# Patient Record
Sex: FEMALE | Race: WHITE | Employment: OTHER | URBAN - NONMETROPOLITAN AREA
[De-identification: names, ages, dates, MRNs, and addresses within clinical notes are randomized per-mention and may not be internally consistent; named-entity substitution may affect disease eponyms.]

---

## 2017-08-15 ENCOUNTER — HOSPITAL ENCOUNTER (OUTPATIENT)
Dept: GENERAL RADIOLOGY | Age: 72
Discharge: HOME | End: 2017-08-15
Payer: MEDICARE

## 2017-08-15 DIAGNOSIS — R05.9 COUGH: ICD-10-CM

## 2017-08-15 DIAGNOSIS — R06.03 ACUTE RESPIRATORY DISTRESS: ICD-10-CM

## 2017-08-15 PROCEDURE — 71020 XR CHEST STANDARD TWO VW: CPT

## 2017-11-24 ENCOUNTER — HOSPITAL ENCOUNTER (OUTPATIENT)
Dept: WOMENS IMAGING | Age: 72
Discharge: HOME | End: 2017-11-24
Payer: MEDICARE

## 2017-11-24 DIAGNOSIS — Z12.39 ENCOUNTER FOR BREAST CANCER SCREENING OTHER THAN MAMMOGRAM: ICD-10-CM

## 2017-11-24 PROCEDURE — G0202 SCR MAMMO BI INCL CAD: HCPCS

## 2018-04-03 ENCOUNTER — HOSPITAL ENCOUNTER (OUTPATIENT)
Dept: ULTRASOUND IMAGING | Age: 73
Discharge: HOME | End: 2018-04-05
Payer: MEDICARE

## 2018-04-03 DIAGNOSIS — K76.89 HEPATIC CYST: ICD-10-CM

## 2018-04-03 PROCEDURE — 76705 ECHO EXAM OF ABDOMEN: CPT

## 2018-05-03 ENCOUNTER — HOSPITAL ENCOUNTER (OUTPATIENT)
Dept: SLEEP CENTER | Age: 73
Discharge: HOME | End: 2018-05-03
Payer: MEDICARE

## 2018-05-03 PROCEDURE — 95810 POLYSOM 6/> YRS 4/> PARAM: CPT

## 2018-05-03 ASSESSMENT — SLEEP AND FATIGUE QUESTIONNAIRES
HOW LIKELY ARE YOU TO NOD OFF OR FALL ASLEEP WHILE SITTING AND READING: 1
ESS TOTAL SCORE: 3
HOW LIKELY ARE YOU TO NOD OFF OR FALL ASLEEP WHEN YOU ARE A PASSENGER IN A CAR FOR AN HOUR WITHOUT A BREAK: 0
HOW LIKELY ARE YOU TO NOD OFF OR FALL ASLEEP WHILE SITTING QUIETLY AFTER LUNCH WITHOUT ALCOHOL: 0
HOW LIKELY ARE YOU TO NOD OFF OR FALL ASLEEP WHILE SITTING INACTIVE IN A PUBLIC PLACE: 0
HOW LIKELY ARE YOU TO NOD OFF OR FALL ASLEEP WHILE SITTING AND TALKING TO SOMEONE: 0
HOW LIKELY ARE YOU TO NOD OFF OR FALL ASLEEP WHILE WATCHING TV: 1
HOW LIKELY ARE YOU TO NOD OFF OR FALL ASLEEP IN A CAR, WHILE STOPPED FOR A FEW MINUTES IN TRAFFIC: 0
HOW LIKELY ARE YOU TO NOD OFF OR FALL ASLEEP WHILE LYING DOWN TO REST IN THE AFTERNOON WHEN CIRCUMSTANCES PERMIT: 1

## 2018-05-18 ENCOUNTER — HOSPITAL ENCOUNTER (OUTPATIENT)
Dept: VASCULAR LAB | Age: 73
Discharge: HOME | End: 2018-05-20
Payer: MEDICARE

## 2018-05-18 DIAGNOSIS — M79.605 PAIN OF LEFT LOWER EXTREMITY: ICD-10-CM

## 2018-05-18 PROCEDURE — 93971 EXTREMITY STUDY: CPT

## 2018-06-12 ENCOUNTER — HOSPITAL ENCOUNTER (OUTPATIENT)
Dept: SLEEP CENTER | Age: 73
Discharge: HOME | End: 2018-06-12
Payer: MEDICARE

## 2018-06-12 PROCEDURE — 95811 POLYSOM 6/>YRS CPAP 4/> PARM: CPT

## 2018-06-12 ASSESSMENT — SLEEP AND FATIGUE QUESTIONNAIRES
HOW LIKELY ARE YOU TO NOD OFF OR FALL ASLEEP WHEN YOU ARE A PASSENGER IN A CAR FOR AN HOUR WITHOUT A BREAK: 0
HOW LIKELY ARE YOU TO NOD OFF OR FALL ASLEEP WHILE WATCHING TV: 3
HOW LIKELY ARE YOU TO NOD OFF OR FALL ASLEEP WHILE SITTING AND TALKING TO SOMEONE: 1
HOW LIKELY ARE YOU TO NOD OFF OR FALL ASLEEP WHILE LYING DOWN TO REST IN THE AFTERNOON WHEN CIRCUMSTANCES PERMIT: 3
HOW LIKELY ARE YOU TO NOD OFF OR FALL ASLEEP WHILE SITTING INACTIVE IN A PUBLIC PLACE: 0
HOW LIKELY ARE YOU TO NOD OFF OR FALL ASLEEP IN A CAR, WHILE STOPPED FOR A FEW MINUTES IN TRAFFIC: 1
HOW LIKELY ARE YOU TO NOD OFF OR FALL ASLEEP WHILE SITTING AND READING: 3
HOW LIKELY ARE YOU TO NOD OFF OR FALL ASLEEP WHILE SITTING QUIETLY AFTER LUNCH WITHOUT ALCOHOL: 0
ESS TOTAL SCORE: 11
NECK CIRCUMFERENCE (INCHES): 17.75

## 2019-03-06 ENCOUNTER — HOSPITAL ENCOUNTER (OUTPATIENT)
Dept: WOMENS IMAGING | Age: 74
Discharge: HOME | End: 2019-03-08
Payer: MEDICARE

## 2019-03-06 DIAGNOSIS — Z12.39 SCREENING BREAST EXAMINATION: ICD-10-CM

## 2019-03-06 PROCEDURE — 77063 BREAST TOMOSYNTHESIS BI: CPT

## 2019-06-07 ENCOUNTER — HOSPITAL ENCOUNTER (OUTPATIENT)
Dept: GENERAL RADIOLOGY | Age: 74
Discharge: HOME | End: 2019-06-09
Payer: MEDICARE

## 2019-06-07 ENCOUNTER — HOSPITAL ENCOUNTER (OUTPATIENT)
Age: 74
Discharge: HOME | End: 2019-06-09
Payer: MEDICARE

## 2019-06-07 DIAGNOSIS — M25.511 RIGHT SHOULDER PAIN, UNSPECIFIED CHRONICITY: ICD-10-CM

## 2019-06-07 PROCEDURE — 73000 X-RAY EXAM OF COLLAR BONE: CPT

## 2019-06-07 PROCEDURE — 73030 X-RAY EXAM OF SHOULDER: CPT

## 2019-08-30 ENCOUNTER — HOSPITAL ENCOUNTER (OUTPATIENT)
Dept: GENERAL RADIOLOGY | Age: 74
Discharge: HOME | End: 2019-09-01
Payer: MEDICARE

## 2019-08-30 ENCOUNTER — HOSPITAL ENCOUNTER (OUTPATIENT)
Age: 74
Discharge: HOME | End: 2019-09-01
Payer: MEDICARE

## 2019-08-30 DIAGNOSIS — R05.9 COUGH: ICD-10-CM

## 2019-08-30 PROCEDURE — 71046 X-RAY EXAM CHEST 2 VIEWS: CPT

## 2020-08-07 ENCOUNTER — HOSPITAL ENCOUNTER (OUTPATIENT)
Dept: WOMENS IMAGING | Age: 75
Discharge: HOME | End: 2020-08-09
Payer: MEDICARE

## 2020-08-07 PROCEDURE — 77067 SCR MAMMO BI INCL CAD: CPT

## 2021-05-24 ENCOUNTER — HOSPITAL ENCOUNTER (OUTPATIENT)
Age: 76
Setting detail: SPECIMEN
Discharge: HOME | End: 2021-05-24
Payer: MEDICARE

## 2021-05-24 LAB
-: ABNORMAL
AMORPHOUS: ABNORMAL
BACTERIA: ABNORMAL
BILIRUBIN URINE: NEGATIVE
CASTS UA: ABNORMAL /LPF
COLOR: YELLOW
COMMENT UA: ABNORMAL
CRYSTALS, UA: ABNORMAL /HPF
EPITHELIAL CELLS UA: ABNORMAL /HPF (ref 0–25)
GLUCOSE URINE: NEGATIVE
KETONES, URINE: ABNORMAL
LEUKOCYTE ESTERASE, URINE: ABNORMAL
MUCUS: ABNORMAL
NITRITE, URINE: POSITIVE
OTHER OBSERVATIONS UA: ABNORMAL
PH UA: 7 (ref 5–9)
PROTEIN UA: ABNORMAL
RBC UA: ABNORMAL /HPF (ref 0–2)
RENAL EPITHELIAL, UA: ABNORMAL /HPF
SPECIFIC GRAVITY UA: 1.01 (ref 1.01–1.02)
TRICHOMONAS: ABNORMAL
TURBIDITY: ABNORMAL
URINE HGB: ABNORMAL
UROBILINOGEN, URINE: NORMAL
WBC UA: ABNORMAL /HPF (ref 0–5)
YEAST: ABNORMAL

## 2021-05-24 PROCEDURE — 87088 URINE BACTERIA CULTURE: CPT

## 2021-05-24 PROCEDURE — 87186 SC STD MICRODIL/AGAR DIL: CPT

## 2021-05-24 PROCEDURE — 81001 URINALYSIS AUTO W/SCOPE: CPT

## 2021-05-24 PROCEDURE — 87086 URINE CULTURE/COLONY COUNT: CPT

## 2021-05-26 LAB
CULTURE: ABNORMAL
Lab: ABNORMAL
SPECIMEN DESCRIPTION: ABNORMAL

## 2021-06-09 ENCOUNTER — HOSPITAL ENCOUNTER (OUTPATIENT)
Age: 76
Discharge: HOME | End: 2021-06-09
Payer: MEDICARE

## 2021-06-09 LAB
-: NORMAL
AMORPHOUS: NORMAL
BACTERIA: NORMAL
BILIRUBIN URINE: NEGATIVE
CASTS UA: NORMAL /LPF
COLOR: YELLOW
COMMENT UA: NORMAL
CRYSTALS, UA: NORMAL /HPF
EPITHELIAL CELLS UA: NORMAL /HPF (ref 0–25)
GLUCOSE URINE: NEGATIVE
KETONES, URINE: NEGATIVE
LEUKOCYTE ESTERASE, URINE: NEGATIVE
MUCUS: NORMAL
NITRITE, URINE: NEGATIVE
OTHER OBSERVATIONS UA: NORMAL
PH UA: 7.5 (ref 5–9)
PROTEIN UA: NEGATIVE
RBC UA: NORMAL /HPF (ref 0–2)
RENAL EPITHELIAL, UA: NORMAL /HPF
SPECIFIC GRAVITY UA: 1.01 (ref 1.01–1.02)
TRICHOMONAS: NORMAL
TURBIDITY: CLEAR
URINE HGB: NEGATIVE
UROBILINOGEN, URINE: NORMAL
WBC UA: NORMAL /HPF (ref 0–5)
YEAST: NORMAL

## 2021-06-09 PROCEDURE — 87086 URINE CULTURE/COLONY COUNT: CPT

## 2021-06-09 PROCEDURE — 81001 URINALYSIS AUTO W/SCOPE: CPT

## 2021-06-11 LAB
CULTURE: NORMAL
Lab: NORMAL
SPECIMEN DESCRIPTION: NORMAL

## 2022-01-11 ENCOUNTER — OFFICE VISIT (OUTPATIENT)
Dept: OBGYN | Age: 77
End: 2022-01-11
Payer: MEDICARE

## 2022-01-11 VITALS
SYSTOLIC BLOOD PRESSURE: 112 MMHG | HEIGHT: 65 IN | DIASTOLIC BLOOD PRESSURE: 74 MMHG | BODY MASS INDEX: 38.49 KG/M2 | WEIGHT: 231 LBS

## 2022-01-11 DIAGNOSIS — N39.41 URGE INCONTINENCE: Primary | ICD-10-CM

## 2022-01-11 DIAGNOSIS — N95.2 VAGINAL ATROPHY: ICD-10-CM

## 2022-01-11 DIAGNOSIS — N81.6 RECTOCELE: ICD-10-CM

## 2022-01-11 DIAGNOSIS — N39.3 STRESS INCONTINENCE: ICD-10-CM

## 2022-01-11 PROBLEM — M19.011 OSTEOARTHRITIS OF RIGHT GLENOHUMERAL JOINT: Status: ACTIVE | Noted: 2022-01-11

## 2022-01-11 PROBLEM — Z96.641 HISTORY OF RIGHT HIP REPLACEMENT: Status: ACTIVE | Noted: 2022-01-11

## 2022-01-11 PROCEDURE — 99203 OFFICE O/P NEW LOW 30 MIN: CPT | Performed by: OBSTETRICS & GYNECOLOGY

## 2022-01-11 RX ORDER — CELECOXIB 200 MG/1
200 CAPSULE ORAL 2 TIMES DAILY
COMMUNITY

## 2022-01-11 RX ORDER — OXYBUTYNIN CHLORIDE 10 MG/1
10 TABLET, EXTENDED RELEASE ORAL DAILY
Qty: 30 TABLET | Refills: 0 | Status: SHIPPED | OUTPATIENT
Start: 2022-01-11 | End: 2022-02-01

## 2022-01-11 NOTE — PROGRESS NOTES
DATE OF VISIT:  1/11/22    PATIENT NAME:  Justine Black     YOB: 1945    REASON FOR VISIT:    Chief Complaint   Patient presents with    Incontinence     Patient states that she has to wear a pad all the time. She notes that she does ok through the night but as soon as she sits up in the morning urine comes out. Her PCP though perhaps it was a spastic bladder. HISTORY OF PRESENT ILLNESS:  Pt presents with c/o incontinence; states it is the worst upon waking - cant make it to the bathroom; does use a pad daily due to occ leakage; has urgency; only stress symptom is with lifting something heavy; had pap at age 77 in [facility] at [facility]; disc'd rectocele, stress and urge incontinence and trial of oab med      No LMP recorded. Patient is postmenopausal.  Vitals:    01/11/22 1104   BP: 112/74   Weight: 231 lb (104.8 kg)   Height: 5' 5\" (1.651 m)     Body mass index is 38.44 kg/m². Allergies   Allergen Reactions    Sulfamethoxazole-Trimethoprim Rash    Aspirin Other (See Comments)     H/O brain bleed in 1983; told to never take aspirin products      Sulfa Antibiotics Rash     Current Outpatient Medications   Medication Sig Dispense Refill    oxybutynin (DITROPAN XL) 10 MG extended release tablet Take 1 tablet by mouth daily 30 tablet 0    CycloSPORINE (RESTASIS OP) Apply  to eye.  Gabapentin (NEURONTIN PO) Take  by mouth.  omeprazole (PRILOSEC) 20 MG capsule Take 20 mg by mouth daily.  atenolol-chlorthalidone (TENORETIC) 100-25 MG per tablet Take 1 tablet by mouth daily.  sertraline (ZOLOFT) 50 MG tablet Take 50 mg by mouth daily.  rOPINIRole (REQUIP) 0.25 MG tablet Take 0.25 mg by mouth 3 times daily.  magnesium (MAGNESIUM-OXIDE) 250 MG TABS tablet Take 250 mg by mouth 2 times daily.  pyridoxine (B-6) 50 MG tablet Take 50 mg by mouth daily.  Omega-3 Fatty Acids (FISH OIL) 1200 MG CAPS Take 1 tablet by mouth.       vitamin E 1000 UNITS capsule Take 1,000 Units by mouth daily.  calcium carbonate 600 MG TABS tablet Take 1 tablet by mouth 2 times daily. No current facility-administered medications for this visit. Social History     Socioeconomic History    Marital status:      Spouse name: None    Number of children: None    Years of education: None    Highest education level: None   Occupational History    None   Tobacco Use    Smoking status: Never Smoker    Smokeless tobacco: Never Used   Substance and Sexual Activity    Alcohol use: Never    Drug use: Yes     Types: Marijuana Mercedes Corrigan     Comment: Medical marijuana    Sexual activity: Not Currently     Partners: Male   Other Topics Concern    None   Social History Narrative    None     Social Determinants of Health     Financial Resource Strain:     Difficulty of Paying Living Expenses: Not on file   Food Insecurity:     Worried About Running Out of Food in the Last Year: Not on file    Timothy of Food in the Last Year: Not on file   Transportation Needs:     Lack of Transportation (Medical): Not on file    Lack of Transportation (Non-Medical):  Not on file   Physical Activity:     Days of Exercise per Week: Not on file    Minutes of Exercise per Session: Not on file   Stress:     Feeling of Stress : Not on file   Social Connections:     Frequency of Communication with Friends and Family: Not on file    Frequency of Social Gatherings with Friends and Family: Not on file    Attends Restorationism Services: Not on file    Active Member of Clubs or Organizations: Not on file    Attends Club or Organization Meetings: Not on file    Marital Status: Not on file   Intimate Partner Violence:     Fear of Current or Ex-Partner: Not on file    Emotionally Abused: Not on file    Physically Abused: Not on file    Sexually Abused: Not on file   Housing Stability:     Unable to Pay for Housing in the Last Year: Not on file    Number of [place] in the Last Year: Not on file  Unstable Housing in the Last Year: Not on file       REVIEW OF SYSTEMS:  Review of Systems   Genitourinary: Positive for urgency. Negative for dysuria and vaginal discharge. PHYSICAL EXAM:  /74   Ht 5' 5\" (1.651 m)   Wt 231 lb (104.8 kg)   BMI 38.44 kg/m²   Physical Exam  Constitutional:       Appearance: Normal appearance. Genitourinary:      Right Labia: No rash. Left Labia: No rash. Posterior vaginal prolapse present. Moderate vaginal atrophy present. HENT:      Head: Normocephalic and atraumatic. Eyes:      Extraocular Movements: Extraocular movements intact. Pupils: Pupils are equal, round, and reactive to light. Cardiovascular:      Rate and Rhythm: Normal rate. Pulmonary:      Effort: Pulmonary effort is normal.   Musculoskeletal:         General: Normal range of motion. Cervical back: Normal range of motion. Neurological:      Mental Status: She is alert and oriented to person, place, and time. Skin:     General: Skin is warm and dry. Psychiatric:         Mood and Affect: Mood normal.         Behavior: Behavior normal.         Thought Content: Thought content normal.         Judgment: Judgment normal.       The patient, Ayaz Benson is a 64 y.o. female, was seen with a total time spent of 30 minutes for the visit on this date of service by the E/M provider. The time component had both face to face and non face to face time spent in determining the total time component. Counseling and education regarding her diagnosis listed below and her options regarding those diagnoses were also included in determining her time component. Diagnosis Orders   1. Urge incontinence     2. Rectocele     3. Stress incontinence     4. Vaginal atrophy          The patient had her preventative health maintenance recommendations and follow-up reviewed with her at the completion of her visit. ASSESSMENT:      1. Urge incontinence    2. Rectocele    3.  Stress incontinence    4. Vaginal atrophy        PLAN:  No orders of the defined types were placed in this encounter. Return in about 3 weeks (around 2/1/2022) for follow up.        Electronically signed by Dayton De La Garza DO on 01/11/22

## 2022-02-01 ENCOUNTER — OFFICE VISIT (OUTPATIENT)
Dept: OBGYN | Age: 77
End: 2022-02-01
Payer: MEDICARE

## 2022-02-01 VITALS
WEIGHT: 232 LBS | SYSTOLIC BLOOD PRESSURE: 122 MMHG | HEIGHT: 65 IN | DIASTOLIC BLOOD PRESSURE: 78 MMHG | BODY MASS INDEX: 38.65 KG/M2

## 2022-02-01 DIAGNOSIS — N39.41 URGE INCONTINENCE: Primary | ICD-10-CM

## 2022-02-01 PROCEDURE — 99213 OFFICE O/P EST LOW 20 MIN: CPT | Performed by: OBSTETRICS & GYNECOLOGY

## 2022-02-01 RX ORDER — OXYBUTYNIN CHLORIDE 15 MG/1
15 TABLET, EXTENDED RELEASE ORAL DAILY
Qty: 30 TABLET | Refills: 11 | Status: SHIPPED | OUTPATIENT
Start: 2022-02-01

## 2022-02-01 NOTE — PROGRESS NOTES
DATE OF VISIT:  2/1/22    PATIENT NAME:  Hugo Lou     YOB: 1945    REASON FOR VISIT:    Chief Complaint   Patient presents with    Follow-up     Patient is being seen for follow up after starting Ditropan. She continues to have symptoms but notes that there has 50% improvement. HISTORY OF PRESENT ILLNESS:  Pt states that the symptoms have improved by about 50% - denies side effects - hoping to improve futher with adjust in dosing      No LMP recorded. Patient is postmenopausal.  Vitals:    02/01/22 1451   BP: 122/78   Weight: 232 lb (105.2 kg)   Height: 5' 5\" (1.651 m)     Body mass index is 38.61 kg/m². Allergies   Allergen Reactions    Sulfamethoxazole-Trimethoprim Rash    Aspirin Other (See Comments)     H/O brain bleed in 1983; told to never take aspirin products      Sulfa Antibiotics Rash     Current Outpatient Medications   Medication Sig Dispense Refill    oxybutynin (DITROPAN XL) 15 MG extended release tablet Take 1 tablet by mouth daily 30 tablet 11    celecoxib (CELEBREX) 200 MG capsule Take 200 mg by mouth 2 times daily      Multiple Vitamins-Minerals (DRY EYE FORMULA PO)       omeprazole (PRILOSEC) 20 MG capsule Take 20 mg by mouth daily.  atenolol-chlorthalidone (TENORETIC) 100-25 MG per tablet Take 1 tablet by mouth daily.  sertraline (ZOLOFT) 50 MG tablet Take 50 mg by mouth daily.  rOPINIRole (REQUIP) 0.25 MG tablet Take 0.25 mg by mouth 3 times daily.  Omega-3 Fatty Acids (FISH OIL) 1200 MG CAPS Take 1 tablet by mouth.  vitamin E 1000 UNITS capsule Take 1,000 Units by mouth daily.  calcium carbonate 600 MG TABS tablet Take 1 tablet by mouth 2 times daily.  CycloSPORINE (RESTASIS OP) Apply  to eye. (Patient not taking: Reported on 1/11/2022)      Gabapentin (NEURONTIN PO) Take  by mouth. (Patient not taking: Reported on 1/11/2022)      magnesium (MAGNESIUM-OXIDE) 250 MG TABS tablet Take 250 mg by mouth 2 times daily. (Patient not taking: Reported on 1/11/2022)      pyridoxine (B-6) 50 MG tablet Take 50 mg by mouth daily. (Patient not taking: Reported on 1/11/2022)       No current facility-administered medications for this visit. Social History     Socioeconomic History    Marital status:      Spouse name: None    Number of children: None    Years of education: None    Highest education level: None   Occupational History    None   Tobacco Use    Smoking status: Never Smoker    Smokeless tobacco: Never Used   Substance and Sexual Activity    Alcohol use: Never    Drug use: Yes     Types: Marijuana Lyndel You)     Comment: Medical marijuana    Sexual activity: Not Currently     Partners: Male   Other Topics Concern    None   Social History Narrative    None     Social Determinants of Health     Financial Resource Strain:     Difficulty of Paying Living Expenses: Not on file   Food Insecurity:     Worried About Running Out of Food in the Last Year: Not on file    Timothy of Food in the Last Year: Not on file   Transportation Needs:     Lack of Transportation (Medical): Not on file    Lack of Transportation (Non-Medical):  Not on file   Physical Activity:     Days of Exercise per Week: Not on file    Minutes of Exercise per Session: Not on file   Stress:     Feeling of Stress : Not on file   Social Connections:     Frequency of Communication with Friends and Family: Not on file    Frequency of Social Gatherings with Friends and Family: Not on file    Attends Jehovah's witness Services: Not on file    Active Member of Clubs or Organizations: Not on file    Attends Club or Organization Meetings: Not on file    Marital Status: Not on file   Intimate Partner Violence:     Fear of Current or Ex-Partner: Not on file    Emotionally Abused: Not on file    Physically Abused: Not on file    Sexually Abused: Not on file   Housing Stability:     Unable to Pay for Housing in the Last Year: Not on file    Number of

## 2022-03-03 ENCOUNTER — TELEPHONE (OUTPATIENT)
Dept: OBGYN CLINIC | Age: 77
End: 2022-03-03

## 2022-03-03 NOTE — TELEPHONE ENCOUNTER
Patient called and left a message stating that she was to call and let us know how she was doing on oxybutynin. Dosage was increased to 15mg. She reports a 75-80% effectiveness and she is wondering if she should continue or change dose again. discussed with Ethan Peguero and called pt back and informed her that this improvement rate is about that best she can expect. Harjinder Madrid with that and will continue medication. I reminded that she would need to be seen yearly in order for the medication to continue to be prescribed. She will call back to schedule closer to when she is due.

## 2022-03-10 ENCOUNTER — HOSPITAL ENCOUNTER (OUTPATIENT)
Dept: WOMENS IMAGING | Age: 77
Discharge: HOME | End: 2022-03-12
Payer: MEDICARE

## 2022-03-10 DIAGNOSIS — Z12.31 BREAST CANCER SCREENING BY MAMMOGRAM: ICD-10-CM

## 2022-03-10 PROCEDURE — 77063 BREAST TOMOSYNTHESIS BI: CPT

## 2022-04-08 ENCOUNTER — HOSPITAL ENCOUNTER (OUTPATIENT)
Dept: ULTRASOUND IMAGING | Age: 77
Discharge: HOME | End: 2022-04-10
Payer: MEDICARE

## 2022-04-08 ENCOUNTER — HOSPITAL ENCOUNTER (OUTPATIENT)
Dept: VASCULAR LAB | Age: 77
Discharge: HOME | End: 2022-04-10
Payer: MEDICARE

## 2022-04-08 ENCOUNTER — HOSPITAL ENCOUNTER (OUTPATIENT)
Dept: WOMENS IMAGING | Age: 77
Discharge: HOME | End: 2022-04-10
Payer: MEDICARE

## 2022-04-08 DIAGNOSIS — M79.89 PAIN AND SWELLING OF RIGHT UPPER EXTREMITY: ICD-10-CM

## 2022-04-08 DIAGNOSIS — R92.8 ABNORMAL MAMMOGRAM: ICD-10-CM

## 2022-04-08 DIAGNOSIS — M79.601 PAIN AND SWELLING OF RIGHT UPPER EXTREMITY: ICD-10-CM

## 2022-04-08 PROCEDURE — G0279 TOMOSYNTHESIS, MAMMO: HCPCS

## 2022-04-08 PROCEDURE — 76642 ULTRASOUND BREAST LIMITED: CPT

## 2022-04-08 PROCEDURE — 93971 EXTREMITY STUDY: CPT

## 2022-04-13 ENCOUNTER — HOSPITAL ENCOUNTER (OUTPATIENT)
Dept: GENERAL RADIOLOGY | Age: 77
Discharge: HOME | End: 2022-04-15
Payer: MEDICARE

## 2022-04-13 ENCOUNTER — HOSPITAL ENCOUNTER (OUTPATIENT)
Age: 77
Discharge: HOME | End: 2022-04-15
Payer: MEDICARE

## 2022-04-13 DIAGNOSIS — M25.511 RIGHT SHOULDER PAIN, UNSPECIFIED CHRONICITY: ICD-10-CM

## 2022-04-13 DIAGNOSIS — S40.011A CONTUSION OF RIGHT SCAPULA, INITIAL ENCOUNTER: ICD-10-CM

## 2022-04-13 PROCEDURE — 73030 X-RAY EXAM OF SHOULDER: CPT

## 2022-12-17 ENCOUNTER — APPOINTMENT (OUTPATIENT)
Dept: VASCULAR LAB | Age: 77
DRG: 176 | End: 2022-12-17
Payer: MEDICARE

## 2022-12-17 ENCOUNTER — HOSPITAL ENCOUNTER (INPATIENT)
Age: 77
LOS: 3 days | Discharge: HOME | DRG: 176 | End: 2022-12-20
Attending: FAMILY MEDICINE | Admitting: FAMILY MEDICINE
Payer: MEDICARE

## 2022-12-17 ENCOUNTER — APPOINTMENT (OUTPATIENT)
Dept: CT IMAGING | Age: 77
DRG: 176 | End: 2022-12-17
Payer: MEDICARE

## 2022-12-17 DIAGNOSIS — I26.94 MULTIPLE SUBSEGMENTAL PULMONARY EMBOLI WITHOUT ACUTE COR PULMONALE (HCC): Primary | ICD-10-CM

## 2022-12-17 PROBLEM — I10 ESSENTIAL HYPERTENSION: Status: ACTIVE | Noted: 2022-12-17

## 2022-12-17 PROBLEM — I26.99 BILATERAL PULMONARY EMBOLISM (HCC): Status: ACTIVE | Noted: 2022-12-17

## 2022-12-17 LAB
ABSOLUTE EOS #: 0.17 K/UL (ref 0–0.44)
ABSOLUTE IMMATURE GRANULOCYTE: 0.03 K/UL (ref 0–0.3)
ABSOLUTE LYMPH #: 0.87 K/UL (ref 1.1–3.7)
ABSOLUTE MONO #: 0.66 K/UL (ref 0.1–1.2)
ANION GAP SERPL CALCULATED.3IONS-SCNC: 10 MMOL/L (ref 9–17)
BASOPHILS # BLD: 0 % (ref 0–2)
BASOPHILS ABSOLUTE: <0.03 K/UL (ref 0–0.2)
BUN BLDV-MCNC: 16 MG/DL (ref 8–23)
BUN/CREAT BLD: 16 (ref 9–20)
CALCIUM SERPL-MCNC: 10.1 MG/DL (ref 8.6–10.4)
CHLORIDE BLD-SCNC: 99 MMOL/L (ref 98–107)
CO2: 30 MMOL/L (ref 20–31)
CREAT SERPL-MCNC: 1.01 MG/DL (ref 0.5–0.9)
EOSINOPHILS RELATIVE PERCENT: 2 % (ref 1–4)
GFR SERPL CREATININE-BSD FRML MDRD: 57 ML/MIN/1.73M2
GLUCOSE BLD-MCNC: 97 MG/DL (ref 70–99)
HCT VFR BLD CALC: 34 % (ref 36.3–47.1)
HEMOGLOBIN: 9.9 G/DL (ref 11.9–15.1)
IMMATURE GRANULOCYTES: 0 %
INR BLD: 1.1
LYMPHOCYTES # BLD: 12 % (ref 24–43)
MCH RBC QN AUTO: 23 PG (ref 25.2–33.5)
MCHC RBC AUTO-ENTMCNC: 29.1 G/DL (ref 28.4–34.8)
MCV RBC AUTO: 79.1 FL (ref 82.6–102.9)
MONOCYTES # BLD: 9 % (ref 3–12)
NRBC AUTOMATED: 0 PER 100 WBC
PDW BLD-RTO: 15.9 % (ref 11.8–14.4)
PLATELET # BLD: 211 K/UL (ref 138–453)
PMV BLD AUTO: 9.8 FL (ref 8.1–13.5)
POTASSIUM SERPL-SCNC: 3.4 MMOL/L (ref 3.7–5.3)
PROTHROMBIN TIME: 14.5 SEC (ref 11.5–14.2)
RBC # BLD: 4.3 M/UL (ref 3.95–5.11)
SEG NEUTROPHILS: 77 % (ref 36–65)
SEGMENTED NEUTROPHILS ABSOLUTE COUNT: 5.5 K/UL (ref 1.5–8.1)
SODIUM BLD-SCNC: 139 MMOL/L (ref 135–144)
WBC # BLD: 7.3 K/UL (ref 3.5–11.3)

## 2022-12-17 PROCEDURE — 99285 EMERGENCY DEPT VISIT HI MDM: CPT

## 2022-12-17 PROCEDURE — 85025 COMPLETE CBC W/AUTO DIFF WBC: CPT

## 2022-12-17 PROCEDURE — 71260 CT THORAX DX C+: CPT | Performed by: PHYSICIAN ASSISTANT

## 2022-12-17 PROCEDURE — 1200000000 HC SEMI PRIVATE

## 2022-12-17 PROCEDURE — 6370000000 HC RX 637 (ALT 250 FOR IP): Performed by: PHYSICIAN ASSISTANT

## 2022-12-17 PROCEDURE — 93971 EXTREMITY STUDY: CPT

## 2022-12-17 PROCEDURE — 80048 BASIC METABOLIC PNL TOTAL CA: CPT

## 2022-12-17 PROCEDURE — 2580000003 HC RX 258: Performed by: FAMILY MEDICINE

## 2022-12-17 PROCEDURE — 94761 N-INVAS EAR/PLS OXIMETRY MLT: CPT

## 2022-12-17 PROCEDURE — 36415 COLL VENOUS BLD VENIPUNCTURE: CPT

## 2022-12-17 PROCEDURE — 6370000000 HC RX 637 (ALT 250 FOR IP): Performed by: FAMILY MEDICINE

## 2022-12-17 PROCEDURE — 6360000004 HC RX CONTRAST MEDICATION: Performed by: PHYSICIAN ASSISTANT

## 2022-12-17 PROCEDURE — 85610 PROTHROMBIN TIME: CPT

## 2022-12-17 RX ORDER — ATENOLOL AND CHLORTHALIDONE TABLET 100; 25 MG/1; MG/1
1 TABLET ORAL DAILY
Status: DISCONTINUED | OUTPATIENT
Start: 2022-12-18 | End: 2022-12-17

## 2022-12-17 RX ORDER — CALCIUM CARBONATE 200(500)MG
1 TABLET,CHEWABLE ORAL 2 TIMES DAILY
Status: DISCONTINUED | OUTPATIENT
Start: 2022-12-17 | End: 2022-12-20 | Stop reason: HOSPADM

## 2022-12-17 RX ORDER — CELECOXIB 200 MG/1
200 CAPSULE ORAL 2 TIMES DAILY
Status: DISCONTINUED | OUTPATIENT
Start: 2022-12-17 | End: 2022-12-18

## 2022-12-17 RX ORDER — SODIUM CHLORIDE 0.9 % (FLUSH) 0.9 %
5-40 SYRINGE (ML) INJECTION EVERY 12 HOURS SCHEDULED
Status: DISCONTINUED | OUTPATIENT
Start: 2022-12-17 | End: 2022-12-20 | Stop reason: HOSPADM

## 2022-12-17 RX ORDER — ONDANSETRON 2 MG/ML
4 INJECTION INTRAMUSCULAR; INTRAVENOUS EVERY 6 HOURS PRN
Status: DISCONTINUED | OUTPATIENT
Start: 2022-12-17 | End: 2022-12-20 | Stop reason: HOSPADM

## 2022-12-17 RX ORDER — PANTOPRAZOLE SODIUM 40 MG/1
40 TABLET, DELAYED RELEASE ORAL
Status: DISCONTINUED | OUTPATIENT
Start: 2022-12-18 | End: 2022-12-20 | Stop reason: HOSPADM

## 2022-12-17 RX ORDER — ASCORBIC ACID 500 MG
500 TABLET ORAL DAILY
COMMUNITY

## 2022-12-17 RX ORDER — ACETAMINOPHEN 325 MG/1
650 TABLET ORAL EVERY 6 HOURS PRN
Status: DISCONTINUED | OUTPATIENT
Start: 2022-12-17 | End: 2022-12-20 | Stop reason: HOSPADM

## 2022-12-17 RX ORDER — SODIUM CHLORIDE 0.9 % (FLUSH) 0.9 %
5-40 SYRINGE (ML) INJECTION PRN
Status: DISCONTINUED | OUTPATIENT
Start: 2022-12-17 | End: 2022-12-20 | Stop reason: HOSPADM

## 2022-12-17 RX ORDER — ONDANSETRON 4 MG/1
4 TABLET, ORALLY DISINTEGRATING ORAL EVERY 8 HOURS PRN
Status: DISCONTINUED | OUTPATIENT
Start: 2022-12-17 | End: 2022-12-20 | Stop reason: HOSPADM

## 2022-12-17 RX ORDER — POLYETHYLENE GLYCOL 3350 17 G/17G
17 POWDER, FOR SOLUTION ORAL DAILY PRN
Status: DISCONTINUED | OUTPATIENT
Start: 2022-12-17 | End: 2022-12-20 | Stop reason: HOSPADM

## 2022-12-17 RX ORDER — CHLORTHALIDONE 25 MG/1
25 TABLET ORAL DAILY
Status: DISCONTINUED | OUTPATIENT
Start: 2022-12-18 | End: 2022-12-20 | Stop reason: HOSPADM

## 2022-12-17 RX ORDER — ACETAMINOPHEN 650 MG/1
650 SUPPOSITORY RECTAL EVERY 6 HOURS PRN
Status: DISCONTINUED | OUTPATIENT
Start: 2022-12-17 | End: 2022-12-20 | Stop reason: HOSPADM

## 2022-12-17 RX ORDER — SODIUM CHLORIDE 9 MG/ML
INJECTION, SOLUTION INTRAVENOUS PRN
Status: DISCONTINUED | OUTPATIENT
Start: 2022-12-17 | End: 2022-12-20 | Stop reason: HOSPADM

## 2022-12-17 RX ORDER — ATENOLOL 50 MG/1
100 TABLET ORAL DAILY
Status: DISCONTINUED | OUTPATIENT
Start: 2022-12-18 | End: 2022-12-20 | Stop reason: HOSPADM

## 2022-12-17 RX ORDER — ACETAMINOPHEN 500 MG
1000 TABLET ORAL ONCE
Status: COMPLETED | OUTPATIENT
Start: 2022-12-17 | End: 2022-12-17

## 2022-12-17 RX ORDER — ROPINIROLE 0.25 MG/1
0.25 TABLET, FILM COATED ORAL 3 TIMES DAILY
Status: DISCONTINUED | OUTPATIENT
Start: 2022-12-17 | End: 2022-12-19

## 2022-12-17 RX ORDER — OXYBUTYNIN CHLORIDE 5 MG/1
15 TABLET, EXTENDED RELEASE ORAL DAILY
Status: DISCONTINUED | OUTPATIENT
Start: 2022-12-18 | End: 2022-12-20 | Stop reason: HOSPADM

## 2022-12-17 RX ADMIN — ACETAMINOPHEN 1000 MG: 500 TABLET, FILM COATED ORAL at 18:16

## 2022-12-17 RX ADMIN — SODIUM CHLORIDE, PRESERVATIVE FREE 10 ML: 5 INJECTION INTRAVENOUS at 21:46

## 2022-12-17 RX ADMIN — RIVAROXABAN 15 MG: 15 TABLET, FILM COATED ORAL at 15:02

## 2022-12-17 RX ADMIN — ANTACID TABLETS 500 MG: 500 TABLET, CHEWABLE ORAL at 21:39

## 2022-12-17 RX ADMIN — IOPAMIDOL 75 ML: 755 INJECTION, SOLUTION INTRAVENOUS at 16:46

## 2022-12-17 ASSESSMENT — PAIN DESCRIPTION - ORIENTATION: ORIENTATION: RIGHT

## 2022-12-17 ASSESSMENT — PAIN SCALES - GENERAL
PAINLEVEL_OUTOF10: 8
PAINLEVEL_OUTOF10: 5
PAINLEVEL_OUTOF10: 7

## 2022-12-17 ASSESSMENT — PAIN DESCRIPTION - LOCATION
LOCATION: HEAD
LOCATION: SHOULDER

## 2022-12-17 ASSESSMENT — PAIN - FUNCTIONAL ASSESSMENT
PAIN_FUNCTIONAL_ASSESSMENT: 0-10
PAIN_FUNCTIONAL_ASSESSMENT: ACTIVITIES ARE NOT PREVENTED

## 2022-12-17 ASSESSMENT — PAIN DESCRIPTION - ONSET: ONSET: ON-GOING

## 2022-12-17 ASSESSMENT — PAIN DESCRIPTION - FREQUENCY: FREQUENCY: INTERMITTENT

## 2022-12-17 ASSESSMENT — PAIN DESCRIPTION - DESCRIPTORS: DESCRIPTORS: ACHING

## 2022-12-17 ASSESSMENT — PAIN DESCRIPTION - PAIN TYPE: TYPE: CHRONIC PAIN

## 2022-12-17 NOTE — ED NOTES
Patient has +3 pulses pedal right foot and tibial pulse +2. Swelling in right cath. Patient staes that since she has been at the hospital today she is short of breath when she walked to the bathroom.       Irving Zamarripa RN  12/17/22 8942

## 2022-12-17 NOTE — ED PROVIDER NOTES
[facility] ED  eMERGENCY dEPARTMENT eNCOUnter      Pt Name: Juan A Torres  MRN: [id number]  [place] 1945  Date of evaluation: 12/17/2022  Provider: Ru Wright PA-C    CHIEF COMPLAINT       Chief Complaint   Patient presents with    Leg Swelling     Pt c/o swelling to rt lower leg that began last night. Pt has hx of blood clots. Denies SOB/chest pain           HISTORY OF PRESENT ILLNESS  (Location/Symptom, Timing/Onset, Context/Setting, Quality, Duration, Modifying Factors, Severity.)   Juan A Torres is a 76 y.o. female who presents to the emergency department complaining of right lower leg swelling since yesterday 11:00. She states that she has a history of blood clot in her right upper extremity years ago that she took blood thinners for but is currently not on blood thinners. She denies any chest pain or shortness of breath. She does complain of some pain in her lower calf      Nursing Notes were reviewed. REVIEW OF SYSTEMS    (2-9 systems for level 4, 10 or more for level 5)     Review of Systems   Right lower leg swelling  denies chest pain or shortness of breath    Except as noted above the remainder of the review of systems was reviewed and negative. PAST MEDICAL HISTORY     Past Medical History:   Diagnosis Date    Arthritis     Brain bleed ([facility] [address] 1983    Cataract     History of hip replacement     History of total bilateral knee replacement     Hypertension      None otherwise stated in nurses notes    SURGICAL HISTORY       Past Surgical History:   Procedure Laterality Date    COLONOSCOPY      JOINT REPLACEMENT Bilateral     knees and hips     None otherwise stated in nurses notes    CURRENT MEDICATIONS       Previous Medications    ATENOLOL-CHLORTHALIDONE (TENORETIC) 100-25 MG PER TABLET    Take 1 tablet by mouth daily. CALCIUM CARBONATE 600 MG TABS TABLET    Take 1 tablet by mouth 2 times daily.     CELECOXIB (CELEBREX) 200 MG CAPSULE    Take 200 mg by mouth 2 times daily    CYCLOSPORINE (RESTASIS OP)    Apply  to eye. GABAPENTIN (NEURONTIN PO)    Take  by mouth. MAGNESIUM (MAGNESIUM-OXIDE) 250 MG TABS TABLET    Take 250 mg by mouth 2 times daily. MULTIPLE VITAMINS-MINERALS (DRY EYE FORMULA PO)        OMEGA-3 FATTY ACIDS (FISH OIL) 1200 MG CAPS    Take 1 tablet by mouth. OMEPRAZOLE (PRILOSEC) 20 MG CAPSULE    Take 20 mg by mouth daily. OXYBUTYNIN (DITROPAN XL) 15 MG EXTENDED RELEASE TABLET    Take 1 tablet by mouth daily    PYRIDOXINE (B-6) 50 MG TABLET    Take 50 mg by mouth daily. ROPINIROLE (REQUIP) 0.25 MG TABLET    Take 0.25 mg by mouth 3 times daily. SERTRALINE (ZOLOFT) 50 MG TABLET    Take 50 mg by mouth daily. VITAMIN E 1000 UNITS CAPSULE    Take 1,000 Units by mouth daily. ALLERGIES     Sulfamethoxazole-trimethoprim, Aspirin, and Sulfa antibiotics    FAMILY HISTORY     History reviewed. No pertinent family history. No family status information on file. None otherwise stated in nurses notes    SOCIAL HISTORY      reports that she has never smoked. She has never used smokeless tobacco. She reports current drug use. Drug: Marijuana Berta Hikes). She reports that she does not drink alcohol. lives at home with others     PHYSICAL EXAM    (up to 7 for level 4, 8 or more for level 5)     ED Triage Vitals [12/17/22 1131]   BP Temp Temp src Heart Rate Resp SpO2 Height Weight   (!) 144/64 98.7 °F (37.1 °C) -- 63 16 98 % -- 210 lb (95.3 kg)       Physical Exam   Nursing note and vitals reviewed. Constitutional: Oriented to person, place, and time and well-developed, well-nourished. Head: Normocephalic and atraumatic. Ear: External ears normal.   Nose: Nose normal and midline. Eyes: Conjunctivae and EOM are normal. Pupils are equal, round, and reactive to light. Neck: Normal range of motion. Neck supple. Cardiovascular: Normal rate, regular rhythm, normal heart sounds and intact distal pulses.     Pulmonary/Chest: Effort normal and breath sounds normal. No respiratory distress. No wheezes. No rales. No chest tenderness. Musculoskeletal: Normal range of motion. Right lower leg edema and TTP to right calf. + camron sight. No discoloration of bilateral lower extremities, pedal pulses intact  Neurological: Alert and oriented to person, place, and time. GCS score is 15. Skin: Skin is warm and dry. No rash noted. No erythema. No pallor. Psychiatric: Mood, memory, affect and judgment normal.           DIAGNOSTIC RESULTS     EKG: All EKG's are interpreted by the Emergency Department Physician who either signs or Co-signs this chart in the absence of a cardiologist.        RADIOLOGY:   All plain film, CT, MRI, and formal ultrasound images (except ED bedside ultrasound) are read by the radiologist  CT CHEST PULMONARY EMBOLISM W CONTRAST   Final Result   1. Bilateral acute pulmonary emboli. Overall small to moderate thrombus   load. No right heart strain. 2. Indeterminate exophytic 2.3 x 3.1 cm posteroinferior right lobe of liver   hypodensity. Further workup with non emergency outpatient MRI liver   recommended. 3. Scattered hepatic cysts up to 6.2 cm. Findings were discussed with CAMI RIOS at 5:27 pm on 12/17/2022.          VL DUP LOWER EXTREMITY VENOUS RIGHT    (Results Pending)                 LABS:  Labs Reviewed   CBC WITH AUTO DIFFERENTIAL - Abnormal; Notable for the following components:       Result Value    Hemoglobin 9.9 (*)     Hematocrit 34.0 (*)     MCV 79.1 (*)     MCH 23.0 (*)     RDW 15.9 (*)     Seg Neutrophils 77 (*)     Lymphocytes 12 (*)     Absolute Lymph # 0.87 (*)     All other components within normal limits   BASIC METABOLIC PANEL - Abnormal; Notable for the following components:    Creatinine 1.01 (*)     Est, Glom Filt Rate 57 (*)     Potassium 3.4 (*)     All other components within normal limits   PROTIME-INR - Abnormal; Notable for the following components:    Protime 14.5 (*)     All other components within normal limits       All other labs were within normal range or not returned as of this dictation. EMERGENCY DEPARTMENT COURSE and DIFFERENTIAL DIAGNOSIS/MDM:   Vitals:    Vitals:    12/17/22 1816 12/17/22 1830 12/17/22 1845 12/17/22 1900   BP: (!) 175/77 (!) 169/86 (!) 182/82 (!) 166/77   Pulse:       Resp:       Temp:       SpO2: 96% 91% 90% (!) 88%   Weight:           Pt developed some sob with walking to bathroom here, spo2 dropped to 83-85 while walking and pt complained of sob. Spoke with Dr. June Wong, hospitalist, who will admit. Pt received xeralto in ED       ED MEDS:  Orders Placed This Encounter   Medications    rivaroxaban (XARELTO) tablet 15 mg     Order Specific Question:   Indication of Use     Answer:   Treatment-DVT/PE    iopamidol (ISOVUE-370) 76 % injection 75 mL    acetaminophen (TYLENOL) tablet 1,000 mg         CONSULTS:  None    PROCEDURES:  None      FINAL IMPRESSION      1. Multiple subsegmental pulmonary emboli without acute cor pulmonale (HCC)          DISPOSITION/PLAN   DISPOSITION Decision To Admit    PATIENT REFERRED TO:  No follow-up provider specified. DISCHARGE MEDICATIONS:  New Prescriptions    No medications on file         Summation      Patient Course:      ED Medications administered this visit:    Medications   rivaroxaban (XARELTO) tablet 15 mg (15 mg Oral Given 12/17/22 1502)   iopamidol (ISOVUE-370) 76 % injection 75 mL (75 mLs IntraVENous Given 12/17/22 1646)   acetaminophen (TYLENOL) tablet 1,000 mg (1,000 mg Oral Given 12/17/22 1816)       New Prescriptions from this visit:    New Prescriptions    No medications on file       Follow-up:  No follow-up provider specified. Final Impression:   1.  Multiple subsegmental pulmonary emboli without acute cor pulmonale (HCC)               (Please note that portions of this note were completed with a voice recognition program.  Efforts were made to edit the dictations but occasionally words are mis-transcribed.)      (Please note that portions of this note were completed with a voice recognition program.  Efforts were made to edit the dictations but occasionally words are mis-transcribed.)    VILMA Amin PA-C  12/17/22 1907       Ekaterina Avila PA-C  12/17/22 1908

## 2022-12-18 PROBLEM — N18.2 CHRONIC KIDNEY DISEASE, STAGE 2 (MILD): Status: ACTIVE | Noted: 2022-12-18

## 2022-12-18 LAB
ABSOLUTE EOS #: 0.29 K/UL (ref 0–0.44)
ABSOLUTE IMMATURE GRANULOCYTE: <0.03 K/UL (ref 0–0.3)
ABSOLUTE LYMPH #: 0.9 K/UL (ref 1.1–3.7)
ABSOLUTE MONO #: 0.75 K/UL (ref 0.1–1.2)
ANION GAP SERPL CALCULATED.3IONS-SCNC: 8 MMOL/L (ref 9–17)
BASOPHILS # BLD: 0 % (ref 0–2)
BASOPHILS ABSOLUTE: 0.03 K/UL (ref 0–0.2)
BUN BLDV-MCNC: 14 MG/DL (ref 8–23)
BUN/CREAT BLD: 14 (ref 9–20)
CALCIUM SERPL-MCNC: 10.4 MG/DL (ref 8.6–10.4)
CHLORIDE BLD-SCNC: 103 MMOL/L (ref 98–107)
CO2: 30 MMOL/L (ref 20–31)
CREAT SERPL-MCNC: 0.98 MG/DL (ref 0.5–0.9)
EOSINOPHILS RELATIVE PERCENT: 4 % (ref 1–4)
GFR SERPL CREATININE-BSD FRML MDRD: 59 ML/MIN/1.73M2
GLUCOSE BLD-MCNC: 122 MG/DL (ref 70–99)
HCT VFR BLD CALC: 32.1 % (ref 36.3–47.1)
HEMOGLOBIN: 9.6 G/DL (ref 11.9–15.1)
IMMATURE GRANULOCYTES: 0 %
LYMPHOCYTES # BLD: 13 % (ref 24–43)
MAGNESIUM: 1.5 MG/DL (ref 1.6–2.6)
MCH RBC QN AUTO: 23.4 PG (ref 25.2–33.5)
MCHC RBC AUTO-ENTMCNC: 29.9 G/DL (ref 28.4–34.8)
MCV RBC AUTO: 78.3 FL (ref 82.6–102.9)
MONOCYTES # BLD: 11 % (ref 3–12)
NRBC AUTOMATED: 0 PER 100 WBC
PDW BLD-RTO: 15.7 % (ref 11.8–14.4)
PLATELET # BLD: 194 K/UL (ref 138–453)
PMV BLD AUTO: 9.8 FL (ref 8.1–13.5)
POTASSIUM SERPL-SCNC: 3.3 MMOL/L (ref 3.7–5.3)
RBC # BLD: 4.1 M/UL (ref 3.95–5.11)
SEG NEUTROPHILS: 72 % (ref 36–65)
SEGMENTED NEUTROPHILS ABSOLUTE COUNT: 4.72 K/UL (ref 1.5–8.1)
SODIUM BLD-SCNC: 141 MMOL/L (ref 135–144)
WBC # BLD: 6.7 K/UL (ref 3.5–11.3)

## 2022-12-18 PROCEDURE — 6370000000 HC RX 637 (ALT 250 FOR IP): Performed by: FAMILY MEDICINE

## 2022-12-18 PROCEDURE — 83735 ASSAY OF MAGNESIUM: CPT

## 2022-12-18 PROCEDURE — 1200000000 HC SEMI PRIVATE

## 2022-12-18 PROCEDURE — 94761 N-INVAS EAR/PLS OXIMETRY MLT: CPT

## 2022-12-18 PROCEDURE — 80048 BASIC METABOLIC PNL TOTAL CA: CPT

## 2022-12-18 PROCEDURE — 93005 ELECTROCARDIOGRAM TRACING: CPT | Performed by: FAMILY MEDICINE

## 2022-12-18 PROCEDURE — 36415 COLL VENOUS BLD VENIPUNCTURE: CPT

## 2022-12-18 PROCEDURE — 2580000003 HC RX 258: Performed by: FAMILY MEDICINE

## 2022-12-18 PROCEDURE — 2700000000 HC OXYGEN THERAPY PER DAY

## 2022-12-18 RX ORDER — POTASSIUM CHLORIDE 20 MEQ/1
40 TABLET, EXTENDED RELEASE ORAL PRN
Status: DISCONTINUED | OUTPATIENT
Start: 2022-12-18 | End: 2022-12-20 | Stop reason: HOSPADM

## 2022-12-18 RX ORDER — POTASSIUM CHLORIDE 7.45 MG/ML
10 INJECTION INTRAVENOUS PRN
Status: DISCONTINUED | OUTPATIENT
Start: 2022-12-18 | End: 2022-12-20 | Stop reason: HOSPADM

## 2022-12-18 RX ADMIN — SODIUM CHLORIDE, PRESERVATIVE FREE 10 ML: 5 INJECTION INTRAVENOUS at 20:19

## 2022-12-18 RX ADMIN — ATENOLOL 100 MG: 50 TABLET ORAL at 07:22

## 2022-12-18 RX ADMIN — ACETAMINOPHEN 650 MG: 325 TABLET ORAL at 01:14

## 2022-12-18 RX ADMIN — ACETAMINOPHEN 650 MG: 325 TABLET ORAL at 23:19

## 2022-12-18 RX ADMIN — OXYBUTYNIN CHLORIDE 15 MG: 5 TABLET, EXTENDED RELEASE ORAL at 07:24

## 2022-12-18 RX ADMIN — SODIUM CHLORIDE, PRESERVATIVE FREE 10 ML: 5 INJECTION INTRAVENOUS at 08:29

## 2022-12-18 RX ADMIN — PANTOPRAZOLE SODIUM 40 MG: 40 TABLET, DELAYED RELEASE ORAL at 07:24

## 2022-12-18 RX ADMIN — ANTACID TABLETS 500 MG: 500 TABLET, CHEWABLE ORAL at 17:03

## 2022-12-18 RX ADMIN — RIVAROXABAN 15 MG: 15 TABLET, FILM COATED ORAL at 17:03

## 2022-12-18 RX ADMIN — ANTACID TABLETS 500 MG: 500 TABLET, CHEWABLE ORAL at 07:23

## 2022-12-18 RX ADMIN — RIVAROXABAN 15 MG: 15 TABLET, FILM COATED ORAL at 07:21

## 2022-12-18 RX ADMIN — ACETAMINOPHEN 650 MG: 325 TABLET ORAL at 14:33

## 2022-12-18 RX ADMIN — CHLORTHALIDONE 25 MG: 25 TABLET ORAL at 07:22

## 2022-12-18 RX ADMIN — POTASSIUM CHLORIDE 40 MEQ: 1500 TABLET, EXTENDED RELEASE ORAL at 18:37

## 2022-12-18 ASSESSMENT — PAIN DESCRIPTION - LOCATION
LOCATION: SHOULDER

## 2022-12-18 ASSESSMENT — PAIN DESCRIPTION - PAIN TYPE
TYPE: CHRONIC PAIN

## 2022-12-18 ASSESSMENT — PAIN DESCRIPTION - FREQUENCY
FREQUENCY: INTERMITTENT

## 2022-12-18 ASSESSMENT — PAIN DESCRIPTION - ONSET
ONSET: ON-GOING

## 2022-12-18 ASSESSMENT — PAIN DESCRIPTION - ORIENTATION
ORIENTATION: RIGHT

## 2022-12-18 ASSESSMENT — PAIN SCALES - GENERAL
PAINLEVEL_OUTOF10: 0
PAINLEVEL_OUTOF10: 8
PAINLEVEL_OUTOF10: 6
PAINLEVEL_OUTOF10: 10
PAINLEVEL_OUTOF10: 6

## 2022-12-18 ASSESSMENT — PAIN - FUNCTIONAL ASSESSMENT
PAIN_FUNCTIONAL_ASSESSMENT: ACTIVITIES ARE NOT PREVENTED

## 2022-12-18 ASSESSMENT — PAIN DESCRIPTION - DESCRIPTORS
DESCRIPTORS: ACHING

## 2022-12-18 NOTE — PLAN OF CARE
Problem: Discharge Planning  Goal: Discharge to home or other facility with appropriate resources  Outcome: Progressing  Flowsheets (Taken 12/17/2022 2302)  Discharge to home or other facility with appropriate resources: Identify barriers to discharge with patient and caregiver  Note: Patient is from home and plans to return home after discharge. Problem: Pain  Goal: Verbalizes/displays adequate comfort level or baseline comfort level  Outcome: Progressing  Flowsheets (Taken 12/17/2022 2302)  Verbalizes/displays adequate comfort level or baseline comfort level:   Encourage patient to monitor pain and request assistance   Assess pain using appropriate pain scale   Administer analgesics based on type and severity of pain and evaluate response   Implement non-pharmacological measures as appropriate and evaluate response  Note: Pain assessments provided. Problem: Safety - Adult  Goal: Free from fall injury  Outcome: Progressing  Note: Pt bed in lowest position with wheels locked. Call light within reach. Room remains free of obstacles. Pt reminded to use call light as needed, verbalizes understanding. Will continue to monitor. Problem: Respiratory - Adult  Goal: Achieves optimal ventilation and oxygenation  Outcome: Progressing  Flowsheets (Taken 12/17/2022 2302)  Achieves optimal ventilation and oxygenation:   Assess for changes in respiratory status   Position to facilitate oxygenation and minimize respiratory effort   Assess for changes in mentation and behavior   Oxygen supplementation based on oxygen saturation or arterial blood gases  Note: Pt was maintaining SPO2 88-92% on room air. Pt currently on 2 LPM nasal cannula as supplement.       Problem: Cardiovascular - Adult  Goal: Maintains optimal cardiac output and hemodynamic stability  Outcome: Progressing     Problem: Cardiovascular - Adult  Goal: Absence of cardiac dysrhythmias or at baseline  Outcome: Progressing  Flowsheets (Taken 12/17/2022 6346)  Absence of cardiac dysrhythmias or at baseline:   Monitor cardiac rate and rhythm   Assess for signs of decreased cardiac output  Note: Telemetry in place, NSR noted.

## 2022-12-18 NOTE — PROGRESS NOTES
Pt. Is alert and oriented at time of assessment. Pt.vitals are assessed, vitals are WDL. Pt. Has call light in reach and denies further needs at this time.

## 2022-12-18 NOTE — PROGRESS NOTES
Pt. Is alert and oriented at time of assessment. Pt. Has call light in reach. Pt. Vitals are assessed, vitals are WDL. Pt. Denies any needs  Pt. Positioned for comfort at this time.

## 2022-12-18 NOTE — H&P
[address] [facility]  History and Physical        Patient:  Chito Ayoub  MRN: [id number]    Chief Complaint:    Chief Complaint   Patient presents with    Leg Swelling     Pt c/o swelling to rt lower leg that began last night. Pt has hx of blood clots. Denies SOB/chest pain       History Obtained From:  patient, electronic medical record  PCP: Melody Forrester DO    History of Present Illness:   Chito Ayoub is a 76 y.o. female who presents to the emergency department complaining of right lower leg swelling. She states that she has a history of blood clot in her right upper extremity and she took blood thinners for it but is currently not on blood thinners. She denies any chest pain , has  shortness of breath. she has been taking celebrex for her joint pain. She has multiple family members with h/o dvt. Past Medical History:        Diagnosis Date    Arthritis     Blood clot in vein 04/2022    on Xarelto for two months per patient    Brain bleed ([facility] [address] 1983    Cataract     DVT (deep venous thrombosis) ([facility] [address]     History of hip replacement     History of total bilateral knee replacement     Hypertension        Past Surgical History:        Procedure Laterality Date    COLONOSCOPY      JOINT REPLACEMENT Bilateral     knees and hips       Medications Prior to Admission:    Prior to Admission medications    Medication Sig Start Date End Date Taking?  Authorizing Provider   Lifitegrast Victorine Abts OP) Apply 1 drop to eye in the morning and at bedtime   Yes Historical Provider, MD   vitamin C (ASCORBIC ACID) 500 MG tablet Take 500 mg by mouth daily   Yes Historical Provider, MD   Glycerin-Polysorbate 80 (REFRESH DRY EYE THERAPY OP) Apply 1 drop to eye as needed   Yes Historical Provider, MD   oxybutynin (DITROPAN XL) 15 MG extended release tablet Take 1 tablet by mouth daily 2/1/22   Carlota Real DO   celecoxib (CELEBREX) 200 MG capsule Take 200 mg by mouth 2 times daily    Historical Provider, MD   Multiple Vitamins-Minerals (DRY EYE FORMULA PO)     Historical Provider, MD   CycloSPORINE (RESTASIS OP) Apply  to eye. Patient not taking: Reported on 1/11/2022    Historical Provider, MD   Gabapentin (NEURONTIN PO) Take  by mouth. Patient not taking: Reported on 1/11/2022    Historical Provider, MD   omeprazole (PRILOSEC) 20 MG capsule Take 20 mg by mouth daily. Historical Provider, MD   atenolol-chlorthalidone (TENORETIC) 100-25 MG per tablet Take 1 tablet by mouth daily. Historical Provider, MD   sertraline (ZOLOFT) 50 MG tablet Take 50 mg by mouth daily. Historical Provider, MD   rOPINIRole (REQUIP) 0.25 MG tablet Take 0.25 mg by mouth 3 times daily. Patient not taking: Reported on 12/17/2022    Historical Provider, MD   magnesium (MAGNESIUM-OXIDE) 250 MG TABS tablet Take 250 mg by mouth 2 times daily. Patient not taking: No sig reported    Historical Provider, MD   pyridoxine (B-6) 50 MG tablet Take 50 mg by mouth daily. Patient not taking: No sig reported    Historical Provider, MD   Omega-3 Fatty Acids (FISH OIL) 1200 MG CAPS Take 1 tablet by mouth. Historical Provider, MD   vitamin E 1000 UNITS capsule Take 1,000 Units by mouth daily. Historical Provider, MD   calcium carbonate 600 MG TABS tablet Take 1 tablet by mouth 2 times daily. Historical Provider, MD       Allergies:  Sulfamethoxazole-trimethoprim, Aspirin, and Sulfa antibiotics    Social History:   TOBACCO:   reports that she has never smoked. She has never used smokeless tobacco.  ETOH:   reports no history of alcohol use. Family History:       Problem Relation Age of Onset    Deep Vein Thrombosis Mother        Review of Systems:  Constitutional:negative  for fevers, and negative for chills.   Respiratory: positive for shortness of breath, negative for cough, and negative for wheezing  Cardiovascular: negative for chest pain, negative for palpitations, and negative for syncope  Gastrointestinal: negative for abdominal pain, negative for nausea,negative for vomiting, negative for diarrhea, negative for constipation, and negative for hematochezia or melena  Genitourinary: negative for dysuria, negative for urinary urgency, negative for urinary frequency, and negative for hematuria  Neurological: negative for unilateral weakness, numbness or tingling. Has rt leg pain and swelling  All other systems were reviewed with the patient and are negative except as stated    Objective:    Vitals:   Temp: 98.1 °F (36.7 °C)  BP: 136/71  Resp: 18  Heart Rate: 66  SpO2: 92 %  -----------------------------------------------------------------  Exam:  GEN:    Awake, alert and oriented x3. EYES:  EOMI, pupils equal   NECK: Supple. No lymphadenopathy. No carotid bruit  CVS:    regular rate and rhythm, no audible murmur  PULM:  CTA, no wheezes, rales or rhonchi, no acute respiratory distress  ABD:    Bowels sounds normal.  Abdomen is soft. No distention. no tenderness to palpation. EXT:   1+ edema in the RLE . Has rt  calf tenderness. NEURO: Moves all extremities. Motor and sensory are grossly intact  SKIN:  No rashes.   No skin lesions.    -----------------------------------------------------------------  Diagnostic Data:   All diagnostic data was reviewed  Lab Results   Component Value Date    WBC 7.3 12/17/2022    HGB 9.9 (L) 12/17/2022    MCV 79.1 (L) 12/17/2022     12/17/2022      Lab Results   Component Value Date    GLUCOSE 122 (H) 12/18/2022    BUN 14 12/18/2022    CREATININE 0.98 (H) 12/18/2022     12/18/2022    K 3.3 (L) 12/18/2022    CALCIUM 10.4 12/18/2022     12/18/2022    CO2 30 12/18/2022     Lab Results   Component Value Date    WBCUA None 06/09/2021    RBCUA None 06/09/2021    EPITHUA 0 TO 2 06/09/2021    LEUKOCYTESUR NEGATIVE 06/09/2021    SPECGRAV 1.010 06/09/2021    GLUCOSEU NEGATIVE 06/09/2021    KETUA NEGATIVE 06/09/2021    PROTEINU NEGATIVE 06/09/2021    HGBUR NEGATIVE 06/09/2021    CASTUA NOT REPORTED 06/09/2021    CRYSTUA NOT REPORTED 06/09/2021    BACTERIA NOT REPORTED 06/09/2021    YEAST NOT REPORTED 06/09/2021       Assessment:     Principal Problem:    Bilateral pulmonary embolism (HCC)  Active Problems:    Essential hypertension    Chronic kidney disease, stage 2 (mild)  Resolved Problems:    * No resolved hospital problems. *      Plan:     Problem List    None     This patient requires inpatient admission because of bilat pulmonary embolism requiring anticoagulation  Factors affecting the medical complexity of this patient include htn,dvt,ckd stage 2  Estimated length of stay is 2 days  Discussed patient's symptoms and data results including labs and imaging studies with the ER MD at time of admission  High risk drug monitoring: none      CORE MEASURES  DVT prophylaxis:  xarelto  Decubitus ulcer present on admission: No  CODE STATUS: FULL CODE  Nutrition Status: good   Physical therapy: Yes   Old Charts reviewed: Yes  EKG Reviewed:  Yes  Advance Directive Addressed: Yes    Yunior Buckley MD , M.D.  12/18/2022  4:02 PM

## 2022-12-18 NOTE — PROGRESS NOTES
Pt arrived to floor from ED at 2040, admitted by Dr. Little Titus for pulmonary emboli. Pt ambulated to bed independently. Pt oriented to room and call light. VS obtained and assessment done as charted in flow sheet. Pt is A&Ox4. Pt has non-pitting edema to lower extremities. SPO2 92% on room air but patient does have dyspnea noted on exertion. Pt also has been 88-92% during time in ED. Lung sounds diminished in bases and right middle lobe. Pt ambulated to bathroom and back to bed, urine output recorded in flow sheet. Telemetry applied, NSR noted. Navigator completed. Medications reconciled per patient list and updated in chart. Dinner and water provided. Medication administered as ordered. Pt states she is on a home BiPAP. Nasal cannula applied at this time at 2 LPM for supplement. Plan of care reviewed with patient and pt denies any further questions at this time. Call light and bedside table are within reach, will continue to monitor.

## 2022-12-18 NOTE — PROGRESS NOTES
Pt sitting up in chair watching television, just finished dinner and ate 100%. VS obtained and assessment done as charted. Pt continues to have chronic shoulder discomfort, recently had prn Tylenol and declines any other possible interventions at this time stating \"it is something I just have to find the right position for\". PRN potassium oral administered per protocol for K 3.3 resulted this morning. Pt denies any other needs at this time after removing tray from room. Call light remains within reach, will continue to monitor.

## 2022-12-19 LAB
ANION GAP SERPL CALCULATED.3IONS-SCNC: 7 MMOL/L (ref 9–17)
BUN BLDV-MCNC: 19 MG/DL (ref 8–23)
BUN/CREAT BLD: 19 (ref 9–20)
CALCIUM SERPL-MCNC: 10.5 MG/DL (ref 8.6–10.4)
CHLORIDE BLD-SCNC: 102 MMOL/L (ref 98–107)
CO2: 32 MMOL/L (ref 20–31)
CREAT SERPL-MCNC: 1 MG/DL (ref 0.5–0.9)
EKG ATRIAL RATE: 62 BPM
EKG P AXIS: 78 DEGREES
EKG P-R INTERVAL: 290 MS
EKG Q-T INTERVAL: 424 MS
EKG QRS DURATION: 98 MS
EKG QTC CALCULATION (BAZETT): 430 MS
EKG R AXIS: -5 DEGREES
EKG T AXIS: 48 DEGREES
EKG VENTRICULAR RATE: 62 BPM
GFR SERPL CREATININE-BSD FRML MDRD: 57 ML/MIN/1.73M2
GLUCOSE BLD-MCNC: 116 MG/DL (ref 70–99)
POTASSIUM SERPL-SCNC: 4.2 MMOL/L (ref 3.7–5.3)
SODIUM BLD-SCNC: 141 MMOL/L (ref 135–144)

## 2022-12-19 PROCEDURE — 94761 N-INVAS EAR/PLS OXIMETRY MLT: CPT

## 2022-12-19 PROCEDURE — 80048 BASIC METABOLIC PNL TOTAL CA: CPT

## 2022-12-19 PROCEDURE — 2580000003 HC RX 258: Performed by: FAMILY MEDICINE

## 2022-12-19 PROCEDURE — 2700000000 HC OXYGEN THERAPY PER DAY

## 2022-12-19 PROCEDURE — 6370000000 HC RX 637 (ALT 250 FOR IP): Performed by: FAMILY MEDICINE

## 2022-12-19 PROCEDURE — 1200000000 HC SEMI PRIVATE

## 2022-12-19 PROCEDURE — 36415 COLL VENOUS BLD VENIPUNCTURE: CPT

## 2022-12-19 PROCEDURE — 93010 ELECTROCARDIOGRAM REPORT: CPT | Performed by: FAMILY MEDICINE

## 2022-12-19 RX ADMIN — ACETAMINOPHEN 650 MG: 325 TABLET ORAL at 21:36

## 2022-12-19 RX ADMIN — CHLORTHALIDONE 25 MG: 25 TABLET ORAL at 08:45

## 2022-12-19 RX ADMIN — OXYBUTYNIN CHLORIDE 15 MG: 5 TABLET, EXTENDED RELEASE ORAL at 08:46

## 2022-12-19 RX ADMIN — ANTACID TABLETS 500 MG: 500 TABLET, CHEWABLE ORAL at 17:09

## 2022-12-19 RX ADMIN — SODIUM CHLORIDE, PRESERVATIVE FREE 10 ML: 5 INJECTION INTRAVENOUS at 21:36

## 2022-12-19 RX ADMIN — ANTACID TABLETS 500 MG: 500 TABLET, CHEWABLE ORAL at 08:46

## 2022-12-19 RX ADMIN — RIVAROXABAN 15 MG: 15 TABLET, FILM COATED ORAL at 08:45

## 2022-12-19 RX ADMIN — SODIUM CHLORIDE, PRESERVATIVE FREE 10 ML: 5 INJECTION INTRAVENOUS at 08:46

## 2022-12-19 RX ADMIN — ACETAMINOPHEN 650 MG: 325 TABLET ORAL at 15:21

## 2022-12-19 RX ADMIN — RIVAROXABAN 15 MG: 15 TABLET, FILM COATED ORAL at 17:08

## 2022-12-19 RX ADMIN — ATENOLOL 100 MG: 50 TABLET ORAL at 08:45

## 2022-12-19 RX ADMIN — PANTOPRAZOLE SODIUM 40 MG: 40 TABLET, DELAYED RELEASE ORAL at 07:14

## 2022-12-19 ASSESSMENT — PAIN DESCRIPTION - DESCRIPTORS
DESCRIPTORS: SHARP
DESCRIPTORS: ACHING;DISCOMFORT

## 2022-12-19 ASSESSMENT — PAIN DESCRIPTION - LOCATION
LOCATION: SHOULDER
LOCATION: HEAD;SHOULDER

## 2022-12-19 ASSESSMENT — PAIN - FUNCTIONAL ASSESSMENT
PAIN_FUNCTIONAL_ASSESSMENT: PREVENTS OR INTERFERES SOME ACTIVE ACTIVITIES AND ADLS
PAIN_FUNCTIONAL_ASSESSMENT: PREVENTS OR INTERFERES SOME ACTIVE ACTIVITIES AND ADLS

## 2022-12-19 ASSESSMENT — PAIN DESCRIPTION - ORIENTATION
ORIENTATION: RIGHT;LEFT
ORIENTATION: RIGHT;LEFT
ORIENTATION: LEFT;RIGHT
ORIENTATION: RIGHT;LEFT
ORIENTATION: RIGHT;LEFT

## 2022-12-19 ASSESSMENT — PAIN SCALES - GENERAL
PAINLEVEL_OUTOF10: 3
PAINLEVEL_OUTOF10: 8
PAINLEVEL_OUTOF10: 8
PAINLEVEL_OUTOF10: 3
PAINLEVEL_OUTOF10: 3
PAINLEVEL_OUTOF10: 0

## 2022-12-19 ASSESSMENT — PAIN DESCRIPTION - PAIN TYPE
TYPE: CHRONIC PAIN

## 2022-12-19 ASSESSMENT — PAIN DESCRIPTION - ONSET
ONSET: ON-GOING
ONSET: ON-GOING

## 2022-12-19 ASSESSMENT — PAIN DESCRIPTION - FREQUENCY
FREQUENCY: INTERMITTENT
FREQUENCY: INTERMITTENT

## 2022-12-19 NOTE — PROGRESS NOTES
Progress Note    SUBJECTIVE:    Patient seen for f/u of Bilateral pulmonary embolism ([address] She resting in bed alert and no distress. Sitting on bedside. No complaints or distress     ROS:   Constitutional: negative  for fevers, and negative for chills. Respiratory: negative for shortness of breath, negative for cough, and negative for wheezing  Cardiovascular: negative for chest pain, and negative for palpitations  Gastrointestinal: negative for abdominal pain, negative for nausea,negative for vomiting, negative for diarrhea, and negative for constipation     All other systems were reviewed with the patient and are negative unless otherwise stated in HPI      OBJECTIVE:      Vitals:   Vitals:    12/19/22 0701   BP: (!) 143/75   Pulse: 61   Resp: 18   Temp: 97.4 °F (36.3 °C)   SpO2: 93%     Weight: 219 lb 12.8 oz (99.7 kg)   Height: 5' 5\" (165.1 cm)     Weight  Wt Readings from Last 3 Encounters:   12/19/22 219 lb 12.8 oz (99.7 kg)   02/01/22 232 lb (105.2 kg)   01/11/22 231 lb (104.8 kg)     Body mass index is 36.58 kg/m². 24HR INTAKE/OUTPUT:      Intake/Output Summary (Last 24 hours) at 12/19/2022 0719  Last data filed at 12/19/2022 0714  Gross per 24 hour   Intake 1460 ml   Output 1450 ml   Net 10 ml     -----------------------------------------------------------------  Exam:    GEN:    Awake, alert and oriented x3. EYES:  EOMI, pupils equal   NECK: Supple. No lymphadenopathy. No carotid bruit  CVS:    regular rate and rhythm, no audible murmur  PULM:  CTA, no wheezes, rales or rhonchi, no acute respiratory distress  ABD:    Bowels sounds normal.  Abdomen is soft. No distention. no tenderness to palpation. EXT:   no edema bilaterally . No calf tenderness. NEURO: Moves all extremities. Motor and sensory are grossly intact  SKIN:  No rashes.   No skin lesions.    -----------------------------------------------------------------    Diagnostic Data:      Complete Blood Count:   Recent Labs 12/17/22  0609 12/17/22  1438   WBC 6.7 7.3   RBC 4.10 4.30   HGB 9.6* 9.9*   HCT 32.1* 34.0*   MCV 78.3* 79.1*   MCH 23.4* 23.0*   MCHC 29.9 29.1   RDW 15.7* 15.9*    211   MPV 9.8 9.8        Last 3 Blood Glucose:   Recent Labs     12/17/22  1438 12/18/22  0609   GLUCOSE 97 122*        Comprehensive Metabolic Profile:   Recent Labs     12/17/22  1438 12/18/22  0609    141   K 3.4* 3.3*   CL 99 103   CO2 30 30   BUN 16 14   CREATININE 1.01* 0.98*   GLUCOSE 97 122*   CALCIUM 10.1 10.4        Urinalysis:   Lab Results   Component Value Date/Time    NITRU NEGATIVE 06/09/2021 01:05 PM    COLORU YELLOW 06/09/2021 01:05 PM    PHUR 7.5 06/09/2021 01:05 PM    WBCUA None 06/09/2021 01:05 PM    RBCUA None 06/09/2021 01:05 PM    MUCUS NOT REPORTED 06/09/2021 01:05 PM    TRICHOMONAS NOT REPORTED 06/09/2021 01:05 PM    YEAST NOT REPORTED 06/09/2021 01:05 PM    BACTERIA NOT REPORTED 06/09/2021 01:05 PM    SPECGRAV 1.010 06/09/2021 01:05 PM    LEUKOCYTESUR NEGATIVE 06/09/2021 01:05 PM    UROBILINOGEN Normal 06/09/2021 01:05 PM    BILIRUBINUR NEGATIVE 06/09/2021 01:05 PM    GLUCOSEU NEGATIVE 06/09/2021 01:05 PM    KETUA NEGATIVE 06/09/2021 01:05 PM    AMORPHOUS NOT REPORTED 06/09/2021 01:05 PM       HgBA1c:    Lab Results   Component Value Date/Time    LABA1C 5.5 05/02/2014 10:10 AM       Lactic Acid: No results found for: LACTA     Troponin: No results for input(s): TROPONINI in the last 72 hours. CRP:  No results for input(s): CRP in the last 72 hours. Radiology/Imaging:  CT CHEST PULMONARY EMBOLISM W CONTRAST   Final Result   1. Bilateral acute pulmonary emboli. Overall small to moderate thrombus   load. No right heart strain. 2. Indeterminate exophytic 2.3 x 3.1 cm posteroinferior right lobe of liver   hypodensity. Further workup with non emergency outpatient MRI liver   recommended. 3. Scattered hepatic cysts up to 6.2 cm. Findings were discussed with CAMI RIOS at 5:27 pm on 12/17/2022. VL DUP LOWER EXTREMITY VENOUS RIGHT    (Results Pending)         ASSESSMENT / PLAN:  Bilateral pulmonary embolism (HCC)  Continue current therapy   Continue Xarelto  Venous Dop--awaiting results  Hypoxia  Wean oxygen  HTN  Continue Tenormin/Hygroton  Nutrition status:    Well developed, well nourished with no malnutrition  Dietician consult initiated  Hospital Prophylaxis:   DVT: Xarelto   Stress Ulcer: PPI   High risk medications: none   Disposition:    Discharge plan is home likely [address]  [facility], GRIS - CNP , GRIS, NP-C  Hospitalist Medicine        12/19/2022, 7:19 AM

## 2022-12-19 NOTE — PLAN OF CARE
Problem: Discharge Planning  Goal: Discharge to home or other facility with appropriate resources  12/19/2022 1601 by Dorota Silva RN  Outcome: Progressing  Flowsheets (Taken 12/19/2022 1601)  Discharge to home or other facility with appropriate resources: Identify barriers to discharge with patient and caregiver  Note: Pt from home, plan to return home upon discharge. Problem: Discharge Planning  Goal: Discharge to home or other facility with appropriate resources  12/19/2022 1600 by Dorota Silva RN  Outcome: Progressing     Problem: Pain  Goal: Verbalizes/displays adequate comfort level or baseline comfort level  12/19/2022 1601 by Dorota Silva RN  Outcome: Progressing  Flowsheets (Taken 12/19/2022 1601)  Verbalizes/displays adequate comfort level or baseline comfort level:   Encourage patient to monitor pain and request assistance   Administer analgesics based on type and severity of pain and evaluate response   Assess pain using appropriate pain scale  Note: Pain assessed every 4 hours. Patient is able to communicate with staff the need for pain interventions. Patient currently complaining of pt, PRN pain medication given upon pt request. Will continue to monitor.         Problem: Pain  Goal: Verbalizes/displays adequate comfort level or baseline comfort level  12/19/2022 1600 by Dorota Silva RN  Outcome: Progressing     Problem: Safety - Adult  Goal: Free from fall injury  12/19/2022 1601 by Dorota Silva RN  Outcome: Progressing  Flowsheets (Taken 12/19/2022 1601)  Free From Fall Injury: Instruct family/caregiver on patient safety     Problem: Safety - Adult  Goal: Free from fall injury  12/19/2022 1600 by Dorota Silva RN  Outcome: Progressing     Problem: Respiratory - Adult  Goal: Achieves optimal ventilation and oxygenation  12/19/2022 1601 by Dorota Silva RN  Outcome: Progressing  Flowsheets (Taken 12/19/2022 1601)  Achieves optimal ventilation and oxygenation:   Assess for changes in respiratory status   Position to facilitate oxygenation and minimize respiratory effort   Assess for changes in mentation and behavior     Problem: Respiratory - Adult  Goal: Achieves optimal ventilation and oxygenation  12/19/2022 1600 by Magnolia Martinez RN  Outcome: Progressing     Problem: Cardiovascular - Adult  Goal: Maintains optimal cardiac output and hemodynamic stability  12/19/2022 1601 by Magnolia Martinez RN  Outcome: Progressing  Flowsheets (Taken 12/19/2022 1601)  Maintains optimal cardiac output and hemodynamic stability:   Monitor blood pressure and heart rate   Assess for signs of decreased cardiac output     Problem: Cardiovascular - Adult  Goal: Maintains optimal cardiac output and hemodynamic stability  12/19/2022 1600 by Magnolia Martinez RN  Outcome: Progressing     Problem: Cardiovascular - Adult  Goal: Absence of cardiac dysrhythmias or at baseline  12/19/2022 1601 by Magnolia Martinez RN  Outcome: Progressing  Flowsheets (Taken 12/19/2022 1601)  Absence of cardiac dysrhythmias or at baseline:   Monitor cardiac rate and rhythm   Assess for signs of decreased cardiac output     Problem: Cardiovascular - Adult  Goal: Absence of cardiac dysrhythmias or at baseline  12/19/2022 1600 by Magnolia Martinez RN  Outcome: Progressing     Problem: ABCDS Injury Assessment  Goal: Absence of physical injury  12/19/2022 1601 by Magnolia Martinez RN  Outcome: Progressing  Flowsheets (Taken 12/19/2022 1601)  Absence of Physical Injury: Implement safety measures based on patient assessment

## 2022-12-19 NOTE — PROGRESS NOTES
Morning assessment complete at this time. Blood pressure slightly elevated 143/75, other vital signs WNL. Pt SPO2  93% on 2L per nasal cannula. Pt A&O x4, lungs clear, diminished throughout. Non-pitting edema on bilateral lower extremities. Assessment otherwise completed as charted. Pt resting in bed denies further needs at this time, call light within reach, care ongoing.

## 2022-12-19 NOTE — PROGRESS NOTES
Afternoon assessment completed at this time, vital signs WNL, pt denies any pain. Pt was found on room air when writer entered room SPO2 94% on room air. Pt is A&O x4, pt lungs clear and diminished throughout, non-pitting edema still present to bilateral lower extremities. Assessment otherwise completed as charted. Pt denies any further needs at this time, call light in reach, care ongoing.

## 2022-12-19 NOTE — PLAN OF CARE
Problem: Discharge Planning  Goal: Discharge to home or other facility with appropriate resources  Outcome: Progressing     Problem: Pain  Goal: Verbalizes/displays adequate comfort level or baseline comfort level  Outcome: Progressing  Flowsheets (Taken 12/18/2022 2143)  Verbalizes/displays adequate comfort level or baseline comfort level:   Encourage patient to monitor pain and request assistance   Assess pain using appropriate pain scale   Administer analgesics based on type and severity of pain and evaluate response   Implement non-pharmacological measures as appropriate and evaluate response  Note: Pain assessments provided with interventions as appropriate. Problem: Safety - Adult  Goal: Free from fall injury  Outcome: Progressing  Note: Pt bed in lowest position with wheels locked. Call light within reach. Room remains free of obstacles. Pt reminded to use call light as needed, verbalizes understanding. Will continue to monitor. Problem: Respiratory - Adult  Goal: Achieves optimal ventilation and oxygenation  Outcome: Progressing  Flowsheets (Taken 12/18/2022 2143)  Achieves optimal ventilation and oxygenation:   Assess for changes in respiratory status   Assess for changes in mentation and behavior   Position to facilitate oxygenation and minimize respiratory effort   Oxygen supplementation based on oxygen saturation or arterial blood gases  Note: SPO2 maintained above 90%. Pt using supplemental O2 at 2 LPM during evening and night due to not being able to use BiPAP with bilateral pulmonary emboli. Problem: Cardiovascular - Adult  Goal: Maintains optimal cardiac output and hemodynamic stability  Outcome: Progressing  Flowsheets (Taken 12/18/2022 2143)  Maintains optimal cardiac output and hemodynamic stability:   Monitor blood pressure and heart rate   Assess for signs of decreased cardiac output  Note: Labs, VS and I&O monitored.       Problem: Cardiovascular - Adult  Goal: Absence of cardiac dysrhythmias or at baseline  Outcome: Progressing  Flowsheets (Taken 12/18/2022 2143)  Absence of cardiac dysrhythmias or at baseline:   Monitor cardiac rate and rhythm   Administer antiarrhythmia medication and electrolyte replacement as ordered   Assess for signs of decreased cardiac output  Note: Pt is on telemetry, NSR noted.

## 2022-12-19 NOTE — PROGRESS NOTES
VS rechecked and pt reassessed at this time. Pt remains A&Ox4. Pt continues to have chronic shoulder pain and requests prn Tylenol before going to sleep which was administered at this time. Hat emptied, output recorded. Pt denies any other needs at this time and has call light within reach, will monitor.

## 2022-12-19 NOTE — PROGRESS NOTES
Comprehensive Nutrition Assessment    Type and Reason for Visit:  Initial    Nutrition Recommendations/Plan:   Continue current diet. Malnutrition Assessment:  Malnutrition Status:  No malnutrition (12/19/22 1157)    Context:  Acute Illness     Findings of the 6 clinical characteristics of malnutrition:  Energy Intake:  No significant decrease in energy intake  Weight Loss:  No significant weight loss     Body Fat Loss:  No significant body fat loss     Muscle Mass Loss:  No significant muscle mass loss    Fluid Accumulation:  Mild Extremities (non pitting BLE)   Strength:  Not Performed    Nutrition Assessment:    Altered nutrition related labs r/t impaired nutrient utilization aeb Calcium elevated at 10.5, magnesium low at 1.5. No vitamin D level noted. Pt admitted with bilateral pulmonary emboli. She denied any weight or PO changes. Pt denied any diet education needs. States she lives alone and she stays up late, gets up between 9-1 am. She has coffee, juice, and biscotti (2) for breakfast. at 3 pm she has a frozen meal, Stated she had COVID in October and she \"still schmidt not have her tast back yet, but it is bettter. Nutrition Related Findings:    appears well nourished Wound Type: None       Current Nutrition Intake & Therapies:    Average Meal Intake: %  Average Supplements Intake: None Ordered  ADULT DIET; Regular    Anthropometric Measures:  Height: 5' 5\" (165.1 cm)  Ideal Body Weight (IBW): 125 lbs (57 kg)    Admission Body Weight: 220 lb (99.8 kg)  Current Body Weight: 219 lb 12.8 oz (99.7 kg), 175.8 % IBW.  Weight Source: Bed Scale  Current BMI (kg/m2): 36.6  Usual Body Weight: 232 lb (105.2 kg)  % Weight Change (Calculated): -5.3  Weight Adjustment For: No Adjustment                 BMI Categories: Obese Class 2 (BMI 35.0 -39.9)    Nutrition Diagnosis:   Altered nutrition-related lab values related to impaired nutrient utilization as evidenced by lab values    Nutrition Interventions: Food and/or Nutrient Delivery: Continue Current Diet     Coordination of Nutrition Care: Continue to monitor while inpatient  Plan of Care discussed with: Patient    Goals:     Goals: PO intake 75% or greater     Recent Labs     12/17/22  1438 12/18/22  0609 12/19/22  0825    141 141   K 3.4* 3.3* 4.2   CL 99 103 102   CO2 30 30 32*   BUN 16 14 19   CREATININE 1.01* 0.98* 1.00*   GLUCOSE 97 122* 116*      Lab Results   Component Value Date/Time    LABALBU 4.1 11/07/2016 09:59 AM    LABALBU 4.3 12/05/2011 09:25 AM    No results found for: PHOS   Lab Results   Component Value Date/Time    MG 1.5 12/18/2022 06:09 AM    No results found for: PREALBUMIN     Nutrition Monitoring and Evaluation:   Behavioral-Environmental Outcomes: None Identified  Food/Nutrient Intake Outcomes: Food and Nutrient Intake  Physical Signs/Symptoms Outcomes: Biochemical Data, Weight, Fluid Status or Edema    Discharge Planning:    Continue current diet     Alice Johnson RD, LD  Contact: [phone]

## 2022-12-19 NOTE — PROGRESS NOTES
Pt called out at this time for pain meds. PRN tylenol given per pt request for shoulder and headache pain rated 3/10.

## 2022-12-19 NOTE — PROGRESS NOTES
Case Management Assessment  Initial Evaluation    Date/Time of Evaluation: 12/19/2022 5:19 PM  Assessment Completed by: MEJIA Chacon    If patient is discharged prior to next notation, then this note serves as note for discharge by case management. Patient Name: Chito Ayoub                   YOB: 1945  Diagnosis: Bilateral pulmonary embolism ([address] [I26.99]  Multiple subsegmental pulmonary emboli without acute cor pulmonale ([address] [I26.94]                   Date / Time: 12/17/2022 11:36 AM    Patient Admission Status: Inpatient   Readmission Risk (Low < 19, Mod (19-27), High > 27): Readmission Risk Score: 11.3    Current PCP: Melody Forrester, DO  PCP verified by CM? Yes    Chart Reviewed: na      History Provided by: Patient  Patient Orientation: Alert and Oriented, Person, Place, Situation    Patient Cognition: Alert    Hospitalization in the last 30 days (Readmission):  No    If yes, Readmission Assessment in CM Navigator will be completed. Advance Directives:      Code Status: Full Code   Patient's Primary Decision Maker is: Legal Next of Kin      Discharge Planning:    Patient lives with: Alone Type of Home: House  Primary Care Giver: Self  Patient Support Systems include: Family Members, Children   Current Financial resources: Medicare  Current community resources: None  Current services prior to admission: Home Bipap, Durable Medical Equipment            Current DME: Ag Aguilera            Type of Home Care services:  None    ADLS  Prior functional level: Independent in ADLs/IADLs  Current functional level: Independent in ADLs/IADLs    PT AM-PAC:   /24  OT AM-PAC:   /24    Family can provide assistance at DC: Yes  Would you like Case Management to discuss the discharge plan with any other family members/significant others, and if so, who?  Yes  Plans to Return to Present Housing: Yes  Other Identified Issues/Barriers to RETURNING to current housing: none  Potential Assistance needed at discharge: Durable Medical Equipment            Potential DME: Bipap & cane   Patient expects to discharge to: [facility] for transportation at discharge: family     Financial    Payor: Dean Machado / Plan: Ike Solo PPO / Product Type: Medicare /     Does insurance require precert for SNF: Yes    Potential assistance Purchasing Medications: No  Meds-to-Beds request: NA       [facility] [address] [address] [id number] - [place] - [place] [address] [phone] Viktor Berry [phone]  [facility] [address]  Phone: [phone] Fax: [phone]      Notes:    Factors facilitating achievement of predicted outcomes: Family support, Motivated, Cooperative, and Pleasant    Barriers to discharge: none    Additional Case Management Notes: Patient is a  and lives alone. She uses a Bi-pap and cane at home. Patient does her own cooking and house cleaning. She is independent with her ADL's. Patient manages her own medications and drives. She has no outside services in the home. The Plan for Transition of Care is related to the following treatment goals of Bilateral pulmonary embolism (HCC) [I26.99]  Multiple subsegmental pulmonary emboli without acute cor pulmonale ([address] [O67.89]    IF APPLICABLE: The Patient and/or patient representative Claire Mas and her family were provided with a choice of provider and agrees with the discharge plan. Freedom of choice list with basic dialogue that supports the patient's individualized plan of care/goals and shares the quality data associated with the providers was provided to: na    Patient Representative Name:       The Patient and/or Patient Representative Agree with the Discharge Plan? Yes    Home with no services at this time.      Alida [facility]burn [place]  Case Management Department  Ph: [phone] Fax: [phone]

## 2022-12-20 VITALS
BODY MASS INDEX: 36.03 KG/M2 | HEART RATE: 65 BPM | DIASTOLIC BLOOD PRESSURE: 86 MMHG | WEIGHT: 216.25 LBS | OXYGEN SATURATION: 94 % | HEIGHT: 65 IN | RESPIRATION RATE: 20 BRPM | TEMPERATURE: 96.8 F | SYSTOLIC BLOOD PRESSURE: 122 MMHG

## 2022-12-20 PROCEDURE — 2580000003 HC RX 258: Performed by: FAMILY MEDICINE

## 2022-12-20 PROCEDURE — 6370000000 HC RX 637 (ALT 250 FOR IP): Performed by: FAMILY MEDICINE

## 2022-12-20 RX ADMIN — OXYBUTYNIN CHLORIDE 15 MG: 5 TABLET, EXTENDED RELEASE ORAL at 09:41

## 2022-12-20 RX ADMIN — PANTOPRAZOLE SODIUM 40 MG: 40 TABLET, DELAYED RELEASE ORAL at 09:40

## 2022-12-20 RX ADMIN — ATENOLOL 100 MG: 50 TABLET ORAL at 09:41

## 2022-12-20 RX ADMIN — CHLORTHALIDONE 25 MG: 25 TABLET ORAL at 09:40

## 2022-12-20 RX ADMIN — RIVAROXABAN 15 MG: 15 TABLET, FILM COATED ORAL at 09:40

## 2022-12-20 RX ADMIN — SODIUM CHLORIDE, PRESERVATIVE FREE 10 ML: 5 INJECTION INTRAVENOUS at 09:42

## 2022-12-20 RX ADMIN — ANTACID TABLETS 500 MG: 500 TABLET, CHEWABLE ORAL at 09:40

## 2022-12-20 NOTE — PROGRESS NOTES
Progress Note    SUBJECTIVE:    Patient seen for f/u of Bilateral pulmonary embolism ([address] She resting easy. No distress. No complaints     ROS:   Constitutional: negative  for fevers, and negative for chills. Respiratory: negative for shortness of breath, negative for cough, and negative for wheezing  Cardiovascular: negative for chest pain, and negative for palpitations  Gastrointestinal: negative for abdominal pain, negative for nausea,negative for vomiting, negative for diarrhea, and negative for constipation     All other systems were reviewed with the patient and are negative unless otherwise stated in HPI      OBJECTIVE:      Vitals:   Vitals:    12/20/22 0800   BP: 122/86   Pulse: 65   Resp: 20   Temp: 96.8 °F (36 °C)   SpO2: 94%     Weight: 216 lb 4 oz (98.1 kg)   Height: 5' 5\" (165.1 cm)     Weight  Wt Readings from Last 3 Encounters:   12/20/22 216 lb 4 oz (98.1 kg)   02/01/22 232 lb (105.2 kg)   01/11/22 231 lb (104.8 kg)     Body mass index is 35.99 kg/m². 24HR INTAKE/OUTPUT:      Intake/Output Summary (Last 24 hours) at 12/20/2022 0935  Last data filed at 12/20/2022 0529  Gross per 24 hour   Intake 600 ml   Output 500 ml   Net 100 ml     -----------------------------------------------------------------  Exam:    GEN:    Awake, alert and oriented x3. EYES:  EOMI, pupils equal   NECK: Supple. No lymphadenopathy. No carotid bruit  CVS:    regular rate and rhythm, no audible murmur  PULM:  CTA, no wheezes, rales or rhonchi, no acute respiratory distress  ABD:    Bowels sounds normal.  Abdomen is soft. No distention. no tenderness to palpation. EXT:   no edema bilaterally . No calf tenderness. NEURO: Moves all extremities. Motor and sensory are grossly intact  SKIN:  No rashes.   No skin lesions.    -----------------------------------------------------------------    Diagnostic Data:      Complete Blood Count:   Recent Labs     12/17/22  1438   WBC 7.3   RBC 4.30   HGB 9.9*   HCT 34.0* MCV 79.1*   MCH 23.0*   MCHC 29.1   RDW 15.9*      MPV 9.8        Last 3 Blood Glucose:   Recent Labs     12/17/22  1438 12/18/22  0609 12/19/22  0825   GLUCOSE 97 122* 116*        Comprehensive Metabolic Profile:   Recent Labs     12/17/22  1438 12/18/22  0609 12/19/22  0825    141 141   K 3.4* 3.3* 4.2   CL 99 103 102   CO2 30 30 32*   BUN 16 14 19   CREATININE 1.01* 0.98* 1.00*   GLUCOSE 97 122* 116*   CALCIUM 10.1 10.4 10.5*        Urinalysis:   Lab Results   Component Value Date/Time    NITRU NEGATIVE 06/09/2021 01:05 PM    COLORU YELLOW 06/09/2021 01:05 PM    PHUR 7.5 06/09/2021 01:05 PM    WBCUA None 06/09/2021 01:05 PM    RBCUA None 06/09/2021 01:05 PM    MUCUS NOT REPORTED 06/09/2021 01:05 PM    TRICHOMONAS NOT REPORTED 06/09/2021 01:05 PM    YEAST NOT REPORTED 06/09/2021 01:05 PM    BACTERIA NOT REPORTED 06/09/2021 01:05 PM    SPECGRAV 1.010 06/09/2021 01:05 PM    LEUKOCYTESUR NEGATIVE 06/09/2021 01:05 PM    UROBILINOGEN Normal 06/09/2021 01:05 PM    BILIRUBINUR NEGATIVE 06/09/2021 01:05 PM    GLUCOSEU NEGATIVE 06/09/2021 01:05 PM    KETUA NEGATIVE 06/09/2021 01:05 PM    AMORPHOUS NOT REPORTED 06/09/2021 01:05 PM       HgBA1c:    Lab Results   Component Value Date/Time    LABA1C 5.5 05/02/2014 10:10 AM       Lactic Acid: No results found for: LACTA     Troponin: No results for input(s): TROPONINI in the last 72 hours. CRP:  No results for input(s): CRP in the last 72 hours. Radiology/Imaging:  CT CHEST PULMONARY EMBOLISM W CONTRAST   Final Result   1. Bilateral acute pulmonary emboli. Overall small to moderate thrombus   load. No right heart strain. 2. Indeterminate exophytic 2.3 x 3.1 cm posteroinferior right lobe of liver   hypodensity. Further workup with non emergency outpatient MRI liver   recommended. 3. Scattered hepatic cysts up to 6.2 cm. Findings were discussed with CAMI RIOS at 5:27 pm on 12/17/2022.          VL DUP LOWER EXTREMITY VENOUS RIGHT   Final Result ASSESSMENT / PLAN:  Bilateral pulmonary embolism (HCC)  Continue current therapy   Continue Xarelto  Venous Dop--positive  Hypoxia  Resolved   HTN  Continue Tenormin/Hygroton  Nutrition status:    Well developed, well nourished with no malnutrition  Dietician consult initiated  Hospital Prophylaxis:   DVT: Xarelto   Stress Ulcer: PPI   High risk medications: none   Disposition:    Discharge plan is home  today      GRIS Clarke - CNP , GRIS, NP-C  Hospitalist Medicine        12/20/2022, 9:35 AM

## 2022-12-20 NOTE — PLAN OF CARE
Problem: Discharge Planning  Goal: Discharge to home or other facility with appropriate resources  12/20/2022 0112 by Gayathri Sun RN  Outcome: Progressing  Flowsheets  Taken 12/19/2022 2330  Discharge to home or other facility with appropriate resources: Identify barriers to discharge with patient and caregiver  Taken 12/19/2022 1854  Discharge to home or other facility with appropriate resources: Identify barriers to discharge with patient and caregiver  12/19/2022 1601 by Elie Farah RN  Outcome: Progressing  Flowsheets (Taken 12/19/2022 1601)  Discharge to home or other facility with appropriate resources: Identify barriers to discharge with patient and caregiver  Note: Pt from home, plan to return home upon discharge. 12/19/2022 1600 by Elie Farah RN  Outcome: Progressing     Problem: Pain  Goal: Verbalizes/displays adequate comfort level or baseline comfort level  12/20/2022 0112 by Gayathri Sun RN  Outcome: Progressing  Note: Call light and bedside table with in reach. Bed and chair wheels locked at all times, with bed in lowest position. Frequent checks and needs meet in appropriate timing. 12/19/2022 1601 by Elie Farah RN  Outcome: Progressing  Flowsheets (Taken 12/19/2022 1601)  Verbalizes/displays adequate comfort level or baseline comfort level:   Encourage patient to monitor pain and request assistance   Administer analgesics based on type and severity of pain and evaluate response   Assess pain using appropriate pain scale  Note: Pain assessed every 4 hours. Patient is able to communicate with staff the need for pain interventions. Patient currently complaining of pt, PRN pain medication given upon pt request. Will continue to monitor. 12/19/2022 1600 by Elie Farah RN  Outcome: Progressing     Problem: Safety - Adult  Goal: Free from fall injury  12/20/2022 0112 by Gayathri Sun RN  Outcome: Progressing  Note: Call light and bedside table with in reach.  Bed and chair wheels locked at all times, with bed in lowest position. Frequent checks and needs meet in appropriate timing.     12/19/2022 1601 by Ambrosio Bumpers, RN  Outcome: Progressing  Flowsheets (Taken 12/19/2022 1601)  Free From Fall Injury: Instruct family/caregiver on patient safety  12/19/2022 1600 by Ambrosio Bumpers, RN  Outcome: Progressing     Problem: Respiratory - Adult  Goal: Achieves optimal ventilation and oxygenation  12/20/2022 0112 by Boyd Ponce RN  Outcome: Progressing  Flowsheets (Taken 12/19/2022 1854)  Achieves optimal ventilation and oxygenation: Assess for changes in respiratory status  12/19/2022 1601 by Ambrosio Bumpers, RN  Outcome: Progressing  Flowsheets (Taken 12/19/2022 1601)  Achieves optimal ventilation and oxygenation:   Assess for changes in respiratory status   Position to facilitate oxygenation and minimize respiratory effort   Assess for changes in mentation and behavior  12/19/2022 1600 by Ambrosio Bumpers, RN  Outcome: Progressing     Problem: Cardiovascular - Adult  Goal: Maintains optimal cardiac output and hemodynamic stability  12/20/2022 0112 by Boyd Ponce RN  Outcome: Progressing  Flowsheets (Taken 12/19/2022 1854)  Maintains optimal cardiac output and hemodynamic stability: Monitor blood pressure and heart rate  12/19/2022 1601 by Ambrosio Bumpers, RN  Outcome: Progressing  Flowsheets (Taken 12/19/2022 1601)  Maintains optimal cardiac output and hemodynamic stability:   Monitor blood pressure and heart rate   Assess for signs of decreased cardiac output  12/19/2022 1600 by Ambrosio Bumpers, RN  Outcome: Progressing  Goal: Absence of cardiac dysrhythmias or at baseline  12/20/2022 0112 by Boyd Ponce RN  Outcome: Progressing  Flowsheets (Taken 12/19/2022 1854)  Absence of cardiac dysrhythmias or at baseline: Monitor cardiac rate and rhythm  12/19/2022 1601 by Ambrosio Bumpers, RN  Outcome: Progressing  Flowsheets (Taken 12/19/2022 1601)  Absence of cardiac dysrhythmias or at baseline:   Monitor cardiac rate and rhythm   Assess for signs of decreased cardiac output  12/19/2022 1600 by Justine Kidd RN  Outcome: Progressing     Problem: ABCDS Injury Assessment  Goal: Absence of physical injury  12/20/2022 0112 by Krys Charles RN  Outcome: Progressing  Note: Call light and bedside table with in reach. Bed and chair wheels locked at all times, with bed in lowest position. Frequent checks and needs meet in appropriate timing.     12/19/2022 1601 by Justine Kidd RN  Outcome: Progressing  Flowsheets (Taken 12/19/2022 1601)  Absence of Physical Injury: Implement safety measures based on patient assessment  12/19/2022 1600 by Justine Kidd RN  Outcome: Progressing

## 2022-12-20 NOTE — PLAN OF CARE
Problem: Discharge Planning  Goal: Discharge to home or other facility with appropriate resources  12/20/2022 1035 by Nirmal Clark RN  Outcome: Progressing  Flowsheets (Taken 12/20/2022 0800)  Discharge to home or other facility with appropriate resources: Identify barriers to discharge with patient and caregiver  12/20/2022 0112 by Marc Zhou RN  Outcome: Progressing  Flowsheets  Taken 12/19/2022 2330  Discharge to home or other facility with appropriate resources: Identify barriers to discharge with patient and caregiver  Taken 12/19/2022 1854  Discharge to home or other facility with appropriate resources: Identify barriers to discharge with patient and caregiver     Problem: Pain  Goal: Verbalizes/displays adequate comfort level or baseline comfort level  12/20/2022 1035 by Nirmal Clark RN  Outcome: Progressing  Flowsheets (Taken 12/20/2022 0800)  Verbalizes/displays adequate comfort level or baseline comfort level: Encourage patient to monitor pain and request assistance  12/20/2022 0112 by Marc Zhou RN  Outcome: Progressing  Note: Call light and bedside table with in reach. Bed and chair wheels locked at all times, with bed in lowest position. Frequent checks and needs meet in appropriate timing. Problem: Safety - Adult  Goal: Free from fall injury  12/20/2022 1035 by Nirmal Clark RN  Outcome: Progressing  Flowsheets (Taken 12/20/2022 1033)  Free From Fall Injury: Instruct family/caregiver on patient safety  12/20/2022 0112 by Marc Zhou RN  Outcome: Progressing  Note: Call light and bedside table with in reach. Bed and chair wheels locked at all times, with bed in lowest position. Frequent checks and needs meet in appropriate timing.        Problem: Respiratory - Adult  Goal: Achieves optimal ventilation and oxygenation  12/20/2022 1035 by Nirmal Clark RN  Outcome: Progressing  Flowsheets (Taken 12/20/2022 0800)  Achieves optimal ventilation and oxygenation:

## 2022-12-20 NOTE — PLAN OF CARE
optimal ventilation and oxygenation  12/20/2022 1302 by Michelle Nassar RN  Outcome: Completed  12/20/2022 1035 by Esha Stokes RN  Outcome: Progressing  Flowsheets (Taken 12/20/2022 0800)  Achieves optimal ventilation and oxygenation: Assess for changes in respiratory status  12/20/2022 0112 by Kathryn Chaudhry RN  Outcome: Progressing  Flowsheets (Taken 12/19/2022 1854)  Achieves optimal ventilation and oxygenation: Assess for changes in respiratory status     Problem: Cardiovascular - Adult  Goal: Maintains optimal cardiac output and hemodynamic stability  12/20/2022 1302 by Michelle Nassar RN  Outcome: Completed  12/20/2022 1035 by Esha Stokes RN  Outcome: Progressing  Flowsheets (Taken 12/20/2022 0800)  Maintains optimal cardiac output and hemodynamic stability: Monitor blood pressure and heart rate  12/20/2022 0112 by Kathryn Chaudhry RN  Outcome: Progressing  Flowsheets (Taken 12/19/2022 1854)  Maintains optimal cardiac output and hemodynamic stability: Monitor blood pressure and heart rate  Goal: Absence of cardiac dysrhythmias or at baseline  12/20/2022 1302 by Michelle Nassar RN  Outcome: Completed  12/20/2022 1035 by Esha Stokes RN  Outcome: Progressing  Flowsheets (Taken 12/20/2022 0800)  Absence of cardiac dysrhythmias or at baseline: Monitor cardiac rate and rhythm  12/20/2022 0112 by Kathryn Chaudhry RN  Outcome: Progressing  Flowsheets (Taken 12/19/2022 1854)  Absence of cardiac dysrhythmias or at baseline: Monitor cardiac rate and rhythm     Problem: ABCDS Injury Assessment  Goal: Absence of physical injury  12/20/2022 1302 by Michelle Nassar RN  Outcome: Completed  12/20/2022 1035 by Esha Stokes RN  Outcome: Progressing  Flowsheets (Taken 12/20/2022 1033)  Absence of Physical Injury: Implement safety measures based on patient assessment  12/20/2022 0112 by Kathryn Chaudhry RN  Outcome: Progressing  Note: Call light and bedside table with in reach.  Bed and chair wheels locked at all times, with bed in lowest position. Frequent checks and needs meet in appropriate timing.

## 2022-12-20 NOTE — PROGRESS NOTES
Writer present, vitals and assessment as charted. Patient sitting up in chair, a/o. Right lower leg with increased swelling compared to left leg. Right lower leg warm and hard to touch, patient states right lower leg is much better then when admitted. Patient denies pain or needs. Call light and bedside table within reach. Will continue to monitor.

## 2022-12-20 NOTE — PROGRESS NOTES
Writer reviewed discharge instructions with patient. Patient verbalized understanding. Education provided for PE. Patient denies questions. Copy of discharge instructions given to patient.

## 2022-12-20 NOTE — DISCHARGE INSTR - DIET

## 2022-12-20 NOTE — DISCHARGE SUMMARY
Discharge Summary    Kate Martinez  :  1945  MRN:  [id number]    Admit date:  2022      Discharge date: 2022     Admitting Physician:  Reg Kurtz MD    Discharge Diagnoses:    Principal Problem:    Bilateral pulmonary embolism [facility])  Active Problems:    Essential hypertension    Chronic kidney disease, stage 2 (mild)  Resolved Problems:    * No resolved hospital problems. *      Hospital Course:   Kate Martinez is a 76 y.o. female admitted with bilateral pulmonary emboli. She presented to the emergency room with complaints of right lower leg swelling. Patient does have history of blood clots in her right upper extremity and took blood thinners for some time but currently is not on any. She denied chest pain. She did complain of shortness of breath. Patient was currently taking Celebrex for joint pain. She does have family history as well of DVT. During patient's admission she was evaluated and found to have pulmonary emboli and was admitted and started on Xarelto. Right lower extremity also showed extensive DVT. Patient was educated on this and will remain on Xarelto lifelong. Hemodynamically she is stable. Hypoxia is resolved. Patient will be discharged home today she will follow-up with primary care as an outpatient. Consultants:  none    Procedures: none    Complications: none    Discharge Condition: fair    Exam:  GEN:    Awake, alert and oriented x3. EYES:   EOMI, pupils equal   NECK: Supple. No lymphadenopathy. No carotid bruit  CVS:     regular rate and rhythm, no audible murmur  PULM:  CTA, no wheezes, rales or rhonchi, no acute respiratory distress  ABD:     Bowels sounds normal.  Abdomen is soft. No distention. no tenderness to palpation. EXT:     no edema bilaterally . No calf tenderness. NEURO: Moves all extremities. Motor and sensory are grossly intact  SKIN:    No rashes. No skin lesions.     Significant Diagnostic Studies:   Lab Results   Component Value Date    WBC 7.3 12/17/2022    HGB 9.9 (L) 12/17/2022     12/17/2022       Lab Results   Component Value Date    BUN 19 12/19/2022    CREATININE 1.00 (H) 12/19/2022     12/19/2022    K 4.2 12/19/2022    CALCIUM 10.5 (H) 12/19/2022     12/19/2022    CO2 32 (H) 12/19/2022    LABGLOM 57 (L) 12/19/2022       Lab Results   Component Value Date    WBCUA None 06/09/2021    RBCUA None 06/09/2021    EPITHUA 0 TO 2 06/09/2021    LEUKOCYTESUR NEGATIVE 06/09/2021    SPECGRAV 1.010 06/09/2021    GLUCOSEU NEGATIVE 06/09/2021    KETUA NEGATIVE 06/09/2021    PROTEINU NEGATIVE 06/09/2021    HGBUR NEGATIVE 06/09/2021    CASTUA NOT REPORTED 06/09/2021    CRYSTUA NOT REPORTED 06/09/2021    BACTERIA NOT REPORTED 06/09/2021    YEAST NOT REPORTED 06/09/2021       CT CHEST PULMONARY EMBOLISM W CONTRAST    Result Date: 12/17/2022  EXAMINATION: CTA OF THE CHEST 12/17/2022 4:46 pm TECHNIQUE: CTA of the chest was performed after the administration of intravenous contrast.  Multiplanar reformatted images are provided for review. MIP images are provided for review. Automated exposure control, iterative reconstruction, and/or weight based adjustment of the mA/kV was utilized to reduce the radiation dose to as low as reasonably achievable. COMPARISON: None. HISTORY: ORDERING SYSTEM PROVIDED HISTORY: dvt, sob TECHNOLOGIST PROVIDED HISTORY: dvt, sob Decision Support Exception - unselect if not a suspected or confirmed emergency medical condition->Emergency Medical Condition (MA) FINDINGS: Pulmonary Arteries: Scattered filling defects in left lower lobe segmental branches, bifurcation of right pulmonary artery and extending into the truncus anterior. Compatible with acute pulmonary emboli. No right heart strain. Mild dilatation of pulmonary trunk. Mediastinum: Cardiac size normal.  No pericardial effusion. No significant lymphadenopathy.   Thyroid and esophagus grossly normal. Lungs/pleura: Subsegmental atelectasis anterior basal right lower lobe and along major fissure right upper lobe likely partly related to a chronic marked elevation of the right hemidiaphragm. No significant lung nodules or masses. No pleural effusion or pneumothorax. Upper Abdomen: Multiple scattered hepatic cysts up to 6.2 cm. A 2.3 x 3.1 cm exophytic hypodense nodule along posterior aspect inferior right lobe of liver demonstrates CT density up to 40 Hounsfield units not consistent with a simple cyst.  Suggest further evaluation with non emergency MRI. Soft Tissues/Bones: No acute bone or soft tissue abnormality. 1. Bilateral acute pulmonary emboli. Overall small to moderate thrombus load. No right heart strain. 2. Indeterminate exophytic 2.3 x 3.1 cm posteroinferior right lobe of liver hypodensity. Further workup with non emergency outpatient MRI liver recommended. 3. Scattered hepatic cysts up to 6.2 cm. Findings were discussed with CAMI RIOS at 5:27 pm on 12/17/2022. VL DUP LOWER EXTREMITY VENOUS RIGHT    Result Date: 12/19/2022    [facility]  Vascular Lower Extremities DVT Study Procedure   Patient Name  Ardie Gaucher       Date of Study           12/17/2022                FRANCISCO TRAN   Date of Birth 1945   Gender                  Female   Age           76 year(s)   Race                       Room Number   0320   Corporate ID  [id number]  #   Patient Acct  [de-identified]  #   MR #          [id number]       [address]   Accession #   [id number]  Interpreting Physician  Sukhi Noel MD   Referring                  Referring Physician     Milad Scott, ALBAN  Nurse                                              Jorje Merchant PA  Practitioner  Procedure Type of Study:   Veins: Lower Extremities DVT Study, Venous Scan Lower Right. Patient Status:STAT. Conclusions   Summary   Acute deep vein thrombosis of the right leg involving the femoral  popliteal, tibial, peroneal veins.    Superficial thrombophlebitis of Right lower extremity is found to involve  the LSV, calf and ankle. Signature   ----------------------------------------------------------------  Electronically signed by BAYRON Lin)TAMI)(CT)DAIANA)(Sonographer) on 12/17/2022 01:47 PM  ----------------------------------------------------------------   ----------------------------------------------------------------  Electronically signed by Chester Bruno MD(Interpreting  physician) on 12/19/2022 06:25 PM  ----------------------------------------------------------------  Findings:   Right Impression:                                   Left Impression:  The common femoral, superficial femoral proximal    Left groin scanned for  and mid, greater saphenous veins, profunda are      comparison appears  patent, compressible with normal doppler responses. normal.  DVT in the distal FV, POP, PTV and peroneal v. Superficial thrombus of the lessor saphenous vein,  calf and ankle. Ankle edema noted. Duplex scan using B-mode, Gray scale imaging. Spectral doppler analysis and color flow throughout  the exam.  Velocities are measured in cm/s ; Diameters are measured in cm Right Lower Extremities DVT Study Measurements Right 2D Measurements +------------------------------------+----------+---------------+----------+ ! Location                            ! Visualized! Compressibility! Thrombosis! +------------------------------------+----------+---------------+----------+ ! Common Femoral                      !Yes       ! Yes            ! None      ! +------------------------------------+----------+---------------+----------+ ! Prox Femoral                        !Yes       ! Yes            ! None      ! +------------------------------------+----------+---------------+----------+ ! Mid Femoral                         !Yes       ! Yes            ! None      ! +------------------------------------+----------+---------------+----------+ ! Dist Femoral !Yes       !No             !Acute     ! +------------------------------------+----------+---------------+----------+ ! Deep Femoral                        !Yes       ! Yes            ! None      ! +------------------------------------+----------+---------------+----------+ ! Popliteal                           !Yes       ! No             !Acute     ! +------------------------------------+----------+---------------+----------+ ! Sapheno Femoral Junction            ! Yes       ! Yes            ! None      ! +------------------------------------+----------+---------------+----------+ ! PTV                                 ! Yes       ! No             !Acute     ! +------------------------------------+----------+---------------+----------+ ! Peroneal                            !Yes       ! No             !Acute     ! +------------------------------------+----------+---------------+----------+ ! Gastroc                             ! Yes       ! Yes            ! None      ! +------------------------------------+----------+---------------+----------+ ! GSV Thigh                           ! Yes       ! Yes            ! None      ! +------------------------------------+----------+---------------+----------+ ! GSV Knee                            ! Yes       ! Yes            ! None      ! +------------------------------------+----------+---------------+----------+ ! GSV Ankle                           ! Yes       ! Yes            ! None      ! +------------------------------------+----------+---------------+----------+ ! SSV                                 ! Yes       ! Yes            ! None      ! +------------------------------------+----------+---------------+----------+ Right Doppler Measurements +-------------------------+----------+------+------------------------------+ ! Location                 ! Signal    !Reflux! Reflux (msec)                 ! +-------------------------+----------+------+------------------------------+ ! Common Femoral !Phasic    ! No    !                              ! +-------------------------+----------+------+------------------------------+ ! Prox Femoral             !Phasic    ! No    !                              ! +-------------------------+----------+------+------------------------------+ ! Popliteal                !Diminished! No    !                              ! +-------------------------+----------+------+------------------------------+ Left Lower Extremities DVT Study Measurements Left 2D Measurements +------------------------------------+----------+---------------+----------+ ! Location                            ! Visualized! Compressibility! Thrombosis! +------------------------------------+----------+---------------+----------+ ! Common Femoral                      !Yes       ! Yes            ! None      ! +------------------------------------+----------+---------------+----------+      Assessment and Plan:  Patient Active Problem List    Diagnosis Date Noted    Chronic kidney disease, stage 2 (mild) 12/18/2022    Bilateral pulmonary embolism ([facility] [address] 12/17/2022    Essential hypertension 12/17/2022    History of right hip replacement 01/11/2022    Osteoarthritis of right glenohumeral joint 01/11/2022        Discharge Medications:         Medication List        START taking these medications      * rivaroxaban 15 MG Tabs tablet  Commonly known as: XARELTO  Take 1 tablet by mouth 2 times daily (with meals) for 18 days     * rivaroxaban 20 MG Tabs tablet  Commonly known as: XARELTO  Take 1 tablet by mouth Daily with supper  Start taking on: January 8, 2023           * This list has 2 medication(s) that are the same as other medications prescribed for you. Read the directions carefully, and ask your doctor or other care provider to review them with you.                 CONTINUE taking these medications      atenolol-chlorthalidone 100-25 MG per tablet  Commonly known as: TENORETIC     calcium carbonate 600 MG Tabs tablet     DRY EYE FORMULA PO     Fish Oil 1200 MG Caps     omeprazole 20 MG delayed release capsule  Commonly known as: PRILOSEC     oxybutynin 15 MG extended release tablet  Commonly known as: Ditropan XL  Take 1 tablet by mouth daily     REFRESH DRY EYE THERAPY OP     sertraline 50 MG tablet  Commonly known as: ZOLOFT     vitamin C 500 MG tablet  Commonly known as: ASCORBIC ACID     vitamin E 1000 units capsule     XIIDRA OP            STOP taking these medications      celecoxib 200 MG capsule  Commonly known as: CELEBREX     magnesium 250 MG Tabs tablet  Commonly known as: MAGNESIUM-OXIDE     NEURONTIN PO     pyridoxine 50 MG tablet  Commonly known as: B-6     RESTASIS OP               Where to Get Your Medications        These medications were sent to [id number] - [place], [address] [facility] [address] [phone]  [address], [place] [address]      Phone: [phone]   rivaroxaban 15 MG Tabs tablet  rivaroxaban 20 MG Tabs tablet         Patient Instructions:    Activity: activity as tolerated  Diet: cardiac diet  Wound Care: none needed  Other: none     Disposition:   Discharge to Home    Follow up:  Patient will be followed by Orion Light DO in 1-2 weeks    CORE MEASURES on Discharge (if applicable)  ACE/ARB in CHF: NA  Statin in MI: NA  ASA in MI: NA  Statin in CVA: NA  Antiplatelet in CVA: NA    Total time spent on discharge services: 40 minutes    Including the following activities:  Evaluation and Management of patient  Discussion with patient and/or surrogate about current care plan  Coordination with Case Management and/or   Coordination of care with Consultants (if applicable)   Coordination of care with Receiving Facility Physician (if applicable)  Completion of DME forms (if applicable)  Preparation of Discharge Summary  Preparation of Medication Reconciliation  Preparation of Discharge Prescriptions    Signed:  Janis Husain, APRN - CNP, APRN, NP-C  12/20/2022, 11:27 AM

## 2023-01-05 ENCOUNTER — OFFICE VISIT (OUTPATIENT)
Dept: PULMONOLOGY | Age: 78
End: 2023-01-05
Payer: MEDICARE

## 2023-01-05 VITALS
WEIGHT: 219.4 LBS | HEIGHT: 65 IN | DIASTOLIC BLOOD PRESSURE: 84 MMHG | SYSTOLIC BLOOD PRESSURE: 153 MMHG | HEART RATE: 61 BPM | OXYGEN SATURATION: 97 % | RESPIRATION RATE: 16 BRPM | BODY MASS INDEX: 36.55 KG/M2 | TEMPERATURE: 96.1 F

## 2023-01-05 DIAGNOSIS — Z86.718 HISTORY OF DVT OF LOWER EXTREMITY: ICD-10-CM

## 2023-01-05 DIAGNOSIS — G47.33 OSA TREATED WITH BIPAP: ICD-10-CM

## 2023-01-05 DIAGNOSIS — E66.9 OBESITY (BMI 35.0-39.9 WITHOUT COMORBIDITY): ICD-10-CM

## 2023-01-05 DIAGNOSIS — I26.99 OTHER ACUTE PULMONARY EMBOLISM WITHOUT ACUTE COR PULMONALE (HCC): Primary | ICD-10-CM

## 2023-01-05 PROCEDURE — 99204 OFFICE O/P NEW MOD 45 MIN: CPT | Performed by: INTERNAL MEDICINE

## 2023-01-05 PROCEDURE — 1123F ACP DISCUSS/DSCN MKR DOCD: CPT | Performed by: INTERNAL MEDICINE

## 2023-01-05 PROCEDURE — 3079F DIAST BP 80-89 MM HG: CPT | Performed by: INTERNAL MEDICINE

## 2023-01-05 PROCEDURE — 3077F SYST BP >= 140 MM HG: CPT | Performed by: INTERNAL MEDICINE

## 2023-01-05 NOTE — PATIENT INSTRUCTIONS
SURVEY:    You may be receiving a survey from [employer] regarding your visit today. Please complete the survey to enable us to provide the highest quality of care to you and your family. If you cannot score us a very good on any question, please call the office to discuss how we could have made your experience a very good one. Thank you. Please recheck your blood pressure when you go home and make sure you take your prescribed medication if applicable . Please follow up with your PCP or ER if needed.

## 2023-01-05 NOTE — PROGRESS NOTES
OUTPATIENT PULMONARY CONSULT NOTE      Patient:  Erin Tom  MRN: [id number]    Consulting Physician: Cheryl Adams DO  Reason for Consult: Pulm embolism/recurrent DVT/obstructive sleep apnea  Primacy Care Physician: Cheryl Adams DO    HISTORY OF PRESENT ILLNESS:   The patient is a 76 y.o. female she is referred here for evaluation of for follow-up of pulm embolism/DVT. According to patient in April 2022 she was diagnosed with right upper extremity DVT which was found when she had a mammogram done and it was apparently an incidental finding she was referred to vascular surgery and she was seen by Dr. Mat Sanchez at that time she had received anticoagulation with Xarelto for about 2 to 3 months according to patient it was discontinue. On 12/17/2022 she presented to emergency room at that time with right lower extremity swelling pain and redness at that time she was found to have acute DVT which involved right femoral-popliteal tibial and peroneal veins. Patient also had a CTA chest done at that time and CT chest was significant for bilateral pulm embolism left upper/lower lobe segmental/subsegmental and right pulmonary artery segmental branches pulm embolism. She was also found to have a right hepatic about 3.1 cm exophytic posterior inferior right lower lobe of liver hypodensity/mass and MRI of the liver was recommended at that time. Patient at that time was admitted to hospital for 3 days started on anticoagulation and currently she is on Xarelto. Patient has been seen by Dr. Mariam Aiken and MRI of the liver is considered once other evaluation. Patient does not complain of shortness of breath according to patient shortness of breath is on exertion activity she is not very active. She did have history of weight gain.   According to patient when she was in the hospital her oxygen saturation was in the 90s she require oxygen but her oxygenation improved and she was discharged home without oxygen she is checking her oxygen and staying in 90s. She does not complain of chest pain dizziness lightheadedness. She does not complain of wheezing, pleuritic pain, hemoptysis. According to patient she had history of cough with acute bronchitis which she get once a year and at that time she use inhaler as needed and she get Z-Devon which usually take care of the bronchitis. She use 2 pillows chronically and she has mild dependent edema chronic right lower extremity edema after DVT has improved. She is not a smoker and she is never diagnosed with COPD or asthma but she was told that she had bronchitis. She has history of obstructive sleep apnea she had a sleep study done in 2018 on review of the sleep study which was a baseline study she had AHI of 124 with severe obstructive sleep apnea she had a titration study done she was initially tried on CPAP but required BiPAP and she was titrated to a high BiPAP pressure of 25/21. According to patient she is been using BiPAP she is compliant with BiPAP she used BiPAP with the mask and heated modification and he used BiPAP every night. Usually he does not take nap during the daytime except sometimes usually does not doze off during the daytime sleep is usually refreshing with the BiPAP she does not wake up with gasping choking sensation when she is on BiPAP. Patient is taking Xarelto at this time      Past Medical History:        Diagnosis Date    Arthritis     Blood clot in vein 04/2022    on Xarelto for two months per patient    Brain bleed ([facility] [address] 1983    Cataract     DVT (deep venous thrombosis) ([facility] [address]     History of hip replacement     History of total bilateral knee replacement     Hypertension        Past Surgical History:        Procedure Laterality Date    COLONOSCOPY      JOINT REPLACEMENT Bilateral     knees and hips       Allergies:     Allergies   Allergen Reactions    Sulfamethoxazole-Trimethoprim Rash    Aspirin Other (See Comments)     H/O brain bleed in 1983; told to never take aspirin products      Sulfa Antibiotics Rash         Home Meds:   Outpatient Encounter Medications as of 1/5/2023   Medication Sig Dispense Refill    rivaroxaban (XARELTO) 15 MG TABS tablet Take 1 tablet by mouth 2 times daily (with meals) for 18 days 36 tablet 0    Lifitegrast (XIIDRA OP) Apply 1 drop to eye in the morning and at bedtime      vitamin C (ASCORBIC ACID) 500 MG tablet Take 500 mg by mouth daily      Glycerin-Polysorbate 80 (REFRESH DRY EYE THERAPY OP) Apply 1 drop to eye as needed      oxybutynin (DITROPAN XL) 15 MG extended release tablet Take 1 tablet by mouth daily 30 tablet 11    Multiple Vitamins-Minerals (DRY EYE FORMULA PO)       omeprazole (PRILOSEC) 20 MG capsule Take 20 mg by mouth daily. atenolol-chlorthalidone (TENORETIC) 100-25 MG per tablet Take 1 tablet by mouth daily. sertraline (ZOLOFT) 50 MG tablet Take 50 mg by mouth daily. Omega-3 Fatty Acids (FISH OIL) 1200 MG CAPS Take 1 tablet by mouth.      vitamin E 1000 UNITS capsule Take 1,000 Units by mouth daily. calcium carbonate 600 MG TABS tablet Take 1 tablet by mouth 2 times daily. [START ON 1/8/2023] rivaroxaban (XARELTO) 20 MG TABS tablet Take 1 tablet by mouth Daily with supper (Patient not taking: Reported on 1/5/2023) 30 tablet 5     No facility-administered encounter medications on file as of 1/5/2023. Social History:   TOBACCO:   reports that she has never smoked. She has never used smokeless tobacco.  ETOH:   reports no history of alcohol use.   OCCUPATION:      Family History:       Problem Relation Age of Onset    Deep Vein Thrombosis Mother        Immunizations:    Immunization History   Administered Date(s) Administered    COVID-19, MODERNA BLUE border, Primary or Immunocompromised, (age 12y+), IM, 100 mcg/0.5mL 01/26/2021, 02/23/2021    COVID-19, MODERNA Bivalent BOOSTER, (age 12y+), IM, 48 mcg/0.5 mL 12/16/2022    COVID-19, MODERNA Booster BLUE border, (age 18y+), IM, 50mcg/0.25mL 11/11/2021    Influenza, FLUAD, (age 72 y+), Adjuvanted, 0.5mL 10/19/2021, 11/03/2022    Influenza, FLUARIX, FLULAVAL, FLUZONE (age 10 mo+) AND AFLURIA, (age 1 y+), PF, 0.5mL 09/14/2020    Pneumococcal Conjugate 13-valent ([id number]) 11/16/2015    Pneumococcal Polysaccharide ([id number]) 09/14/2020    Zoster Live (Zostavax) 12/23/2010         REVIEW OF SYSTEMS:  CONSTITUTIONAL:  negative for  fevers, chills, sweats, fatigue, anorexia, and weight loss  EYES:  negative for  double vision, blurred vision, dry eyes, eye discharge, visual disturbance, redness, and icterus  HEENT:  negative for  hearing loss, tinnitus, ear drainage, earaches, nasal congestion, epistaxis, sore throat, hoarseness, voice change, and postnasal drip  RESPIRATORY: Positive for intermittent  dry cough, positive for dyspnea on exertion negative for cough with sputum, dyspnea, wheezing, hemoptysis, chest pain, and pleuritic pain  CARDIOVASCULAR: Positive for dyspnea on exertion, orthopnea, mild pedal edema negative for  chest pain, palpitations, PND, exertional chest pressure/discomfort, fatigue, syncope  GASTROINTESTINAL:  negative for nausea, vomiting, diarrhea, constipation, abdominal pain, abdominal mass, abdominal distention, jaundice, dysphagia, reflux, odynophagia, hematemesis, and hemtochezia  GENITOURINARY:  negative for frequency, dysuria, nocturia, and hematuria  HEMATOLOGIC/LYMPHATIC:  negative for easy bruising, bleeding, lymphadenopathy, and petechiae  ALLERGIC/IMMUNOLOGIC:  negative for recurrent infections, urticaria, hay fever, angioedema, anaphylaxis, and drug reactions  ENDOCRINE:  negative for heat intolerance, cold intolerance, tremor, and weight changes  MUSCULOSKELETAL:  negative for  myalgias, arthralgias, joint swelling, stiff joints, and muscle weakness  NEUROLOGICAL:  negative for headaches, dizziness, seizures, memory problems, speech problems, visual disturbance, gait problems, tremor, dysphagia, weakness, numbness, syncope, and tingling  BEHAVIOR/PSYCH:  negative for decreased sleep, decreased energy level, increased energy level, poor concentration, depressed mood, and anxiety          Physical Exam:    Vitals: BP (!) 153/84   Pulse 61   Temp (!) 96.1 °F (35.6 °C)   Resp 16   Ht 5' 5\" (1.651 m)   Wt 219 lb 6.4 oz (99.5 kg)   SpO2 97%   BMI 36.51 kg/m²   Last 3 weights: Wt Readings from Last 3 Encounters:   01/05/23 219 lb 6.4 oz (99.5 kg)   12/20/22 216 lb 4 oz (98.1 kg)   02/01/22 232 lb (105.2 kg)     Body mass index is 36.51 kg/m². Physical Examination:   General appearance - alert, well appearing, and in no distress, oriented to person, place, and time, and overweight  Mental status - alert, oriented to person, place, and time  Eyes - pupils equal and reactive, extraocular eye movements intact, sclera anicteric  Ears - right ear normal, left ear normal  Nose - normal and patent, no erythema, discharge or polyps  Mouth - mucous membranes moist, pharynx normal without lesions and large tongue small oropharynx Mallampati 3  Neck - supple, no significant adenopathy, short thick neck  Chest - no tachypnea, retractions or cyanosis bilateral symmetrical chest movement, normal resonance on percussion, air entry is present bilaterally and symmetrical, no expiratory wheezing rhonchi or crackles.   Heart - normal rate, regular rhythm, normal S1, S2, no murmurs, rubs, clicks or gallops  Abdomen - soft, nontender, nondistended, no masses or organomegaly  Neurological - alert, oriented, normal speech, no focal findings or movement disorder noted}  Extremities - peripheral pulses normal, mild pedal edema, no clubbing or cyanosis  Skin - normal coloration and turgor, no rashes, no suspicious skin lesions noted       LABS:    CBC:   WBC   Date Value Ref Range Status   12/17/2022 7.3 3.5 - 11.3 k/uL Final   12/17/2022 6.7 3.5 - 11.3 k/uL Final   06/21/2013 5.1 3.5 - 11.0 k/uL Final     Hemoglobin   Date Value Ref Range Status 12/17/2022 9.9 (L) 11.9 - 15.1 g/dL Final   12/17/2022 9.6 (L) 11.9 - 15.1 g/dL Final   06/21/2013 13.6 12.0 - 16.0 g/dL Final     Platelets   Date Value Ref Range Status   12/17/2022 211 138 - 453 k/uL Final   12/17/2022 194 138 - 453 k/uL Final   06/21/2013 206 140 - 450 k/uL Final     BMP:   Sodium   Date Value Ref Range Status   12/19/2022 141 135 - 144 mmol/L Final   12/18/2022 141 135 - 144 mmol/L Final   12/17/2022 139 135 - 144 mmol/L Final     Potassium   Date Value Ref Range Status   12/19/2022 4.2 3.7 - 5.3 mmol/L Final   12/18/2022 3.3 (L) 3.7 - 5.3 mmol/L Final   12/17/2022 3.4 (L) 3.7 - 5.3 mmol/L Final     Chloride   Date Value Ref Range Status   12/19/2022 102 98 - 107 mmol/L Final   12/18/2022 103 98 - 107 mmol/L Final   12/17/2022 99 98 - 107 mmol/L Final     CO2   Date Value Ref Range Status   12/19/2022 32 (H) 20 - 31 mmol/L Final   12/18/2022 30 20 - 31 mmol/L Final   12/17/2022 30 20 - 31 mmol/L Final     BUN   Date Value Ref Range Status   12/19/2022 19 8 - 23 mg/dL Final   12/18/2022 14 8 - 23 mg/dL Final   12/17/2022 16 8 - 23 mg/dL Final     Creatinine   Date Value Ref Range Status   12/19/2022 1.00 (H) 0.50 - 0.90 mg/dL Final   12/18/2022 0.98 (H) 0.50 - 0.90 mg/dL Final   12/17/2022 1.01 (H) 0.50 - 0.90 mg/dL Final     Glucose   Date Value Ref Range Status   12/19/2022 116 (H) 70 - 99 mg/dL Final   12/18/2022 122 (H) 70 - 99 mg/dL Final   12/17/2022 97 70 - 99 mg/dL Final   12/05/2011 99 74 - 100 mg/dL Final     Hepatic:   AST   Date Value Ref Range Status   11/07/2016 23 <32 U/L Final   06/24/2016 23 <32 U/L Final   02/16/2016 21 <32 U/L Final     ALT   Date Value Ref Range Status   11/07/2016 20 5 - 33 U/L Final   06/24/2016 23 5 - 33 U/L Final   02/16/2016 21 5 - 33 U/L Final     Total Bilirubin   Date Value Ref Range Status   11/07/2016 0.20 (L) 0.3 - 1.2 mg/dL Final   06/24/2016 0.27 (L) 0.3 - 1.2 mg/dL Final   02/16/2016 0.26 (L) 0.3 - 1.2 mg/dL Final     Alkaline Phosphatase Date Value Ref Range Status   11/07/2016 71 35 - 104 U/L Final   06/24/2016 77 35 - 104 U/L Final   02/16/2016 73 35 - 104 U/L Final     Amylase: No results found for: AMYLASE  Lipase: No results found for: LIPASE  CARDIAC ENZYMES: No results found for: CKTOTAL, CKMB, CKMBINDEX, TROPONINI  BNP: No results found for: BNP  Lipids:   Cholesterol   Date Value Ref Range Status   11/07/2016 198 <200 mg/dL Final     Comment:        Cholesterol Guidelines:      <200  Desirable   200-240  Borderline      >240  Undesirable          HDL   Date Value Ref Range Status   11/07/2016 51 >40 mg/dL Final     Comment:        HDL Guidelines:    <40     Undesirable   40-59    Borderline    >59     Desirable            INR:   INR   Date Value Ref Range Status   12/17/2022 1.1  Final     Comment:           Therapeutic Range:    Moderate Anticoagulant Intensity: INR = 2.0-3.0   High Anticoagulant Intensity: INR = 2.5-3.5       Thyroid:   TSH   Date Value Ref Range Status   02/16/2016 4.29 0.30 - 5.00 mIU/L Final     Comment:     Performed at [facility], [address] [place] [address]   [phone]       Urinalysis:   Bacteria, UA   Date Value Ref Range Status   06/09/2021 NOT REPORTED None Final     Color, UA   Date Value Ref Range Status   06/09/2021 YELLOW YELLOW Final     pH, UA   Date Value Ref Range Status   06/09/2021 7.5 5.0 - 9.0 Final     Protein, UA   Date Value Ref Range Status   06/09/2021 NEGATIVE NEGATIVE Final     RBC, UA   Date Value Ref Range Status   06/09/2021 None 0 - 2 /HPF Final     Specific Gravity, UA   Date Value Ref Range Status   06/09/2021 1.010 1.010 - 1.020 Final     Bilirubin Urine   Date Value Ref Range Status   06/09/2021 NEGATIVE NEGATIVE Final     Nitrite, Urine   Date Value Ref Range Status   06/09/2021 NEGATIVE NEGATIVE Final     WBC, UA   Date Value Ref Range Status   06/09/2021 None 0 - 5 /HPF Final     Leukocyte Esterase, Urine   Date Value Ref Range Status   06/09/2021 NEGATIVE NEGATIVE Final     Glucose, Ur   Date Value Ref Range Status   06/09/2021 NEGATIVE NEGATIVE Final     Cultures:-  -----------------------------------------------------------------    ABGs: No results found for: PHART, PO2ART, NEL2WMU    Pulmonary Functions Testing Results:    No results found for: FEV1, FVC, CQR2EJT, TLC, DLCO    CXR      CT Scans    CTA chest 12/17/2022  1. Bilateral acute pulmonary emboli. Overall small to moderate thrombus   load. No right heart strain. 2. Indeterminate exophytic 2.3 x 3.1 cm posteroinferior right lobe of liver   hypodensity. Further workup with non emergency outpatient MRI liver   recommended. 3. Scattered hepatic cysts up to 6.2 cm. Venous Doppler lower extremities. 12/17/2022  Summary  Acute deep vein thrombosis of the right leg involving the femoral popliteal, tibial, peroneal veins. Superficial thrombophlebitis of Right lower extremity is found to involve the LSV, calf and ankle. Assessment and Plan       ICD-10-CM    1. Other acute pulmonary embolism without acute cor pulmonale (HCC)  I26.99       2. History of DVT of lower extremity  Z86.718       3. TALAT on BIPAP G47.33     Z99.89       4. Obesity (BMI 35.0-39.9 without comorbidity)  E66.9           Assessment:    Here history of DVT right upper extremity in April 2022 but history and right lower extremity DVT including femoral vein in December 2022. She had pulmonary embolism with a small to moderate burden of pulm emboli at that time in December 2022 since this is her second episode of thromboembolism she will need lifelong anticoagulation. She will need to rule out malignancy but given her age and as a cause of thromboembolism. She does have a hypodensity area posterolaterally to the right lobe of the liver which need to be investigated and as Dr Kala Sol is going to schedule her for MRI of the liver on her next visit which is scheduled soon.   I would also recommend CT scan of the abdomen and pelvis with contrast for completion of altered. I would also refer her for evaluation by hematology/oncology as the cause of thromboembolism is although she did have COVID infection apparently 1 to 2 months before she had thromboembolism but when she had the first episode of right upper extremity DVT at that time there is no documented COVID infection. She does have morbid obesity decreased mobility which could be a risk factor in her case. Plan and recommendation:    Continue with Xarelto/anticoagulation will likely need lifelong anticoagulation with  Recommend work-up to rule out malignancy as a cause for thromboembolism. Referred to hematology oncology. Recommended proceed with MRI of the liver. I would suggest CT scan of the abdomen and pelvis to complete work-up. Apparently patient had colonoscopy done 2 years ago. She had mammogram done last year      Vaccinations recommended and given  Up to date with vaccinations from [address] [place] [address] an active lifestyle   Home O2 eval   Supplemental O2 ordered/to continue   Smoking cessation recommended/to continue   PFT's reviewed/ordered   CXR reviewed/ordered   CT chest reviewed/ordered   Questions answered to pt's/family's satisfaction    There is no compliance data available from BiPAP as needed compliance data for the compliance monitoring and also determine the effectiveness of BiPAP. Continue BiPAP at current settings 25/21  BIPAP at least 4 hrs qhs every night  Wt loss is recommended and discussed  Follow good sleep hygeine instructions  Use humidifier   Questions answered pertaining to diagnosis and management explained importance of compliance with therapy   Need compliance data now and before next visit. RTC 2 months    It was my pleasure to evaluate Wilber Tee today. Please call with questions.     Greg Buchanan MD, MD             1/5/2023, 4:29 PM

## 2023-01-12 ENCOUNTER — OFFICE VISIT (OUTPATIENT)
Dept: ONCOLOGY | Age: 78
End: 2023-01-12
Payer: MEDICARE

## 2023-01-12 VITALS
DIASTOLIC BLOOD PRESSURE: 78 MMHG | RESPIRATION RATE: 18 BRPM | HEIGHT: 65 IN | SYSTOLIC BLOOD PRESSURE: 129 MMHG | HEART RATE: 60 BPM | BODY MASS INDEX: 36.65 KG/M2 | TEMPERATURE: 97.3 F | WEIGHT: 220 LBS

## 2023-01-12 DIAGNOSIS — I26.99 BILATERAL PULMONARY EMBOLISM (HCC): ICD-10-CM

## 2023-01-12 DIAGNOSIS — I26.99 BILATERAL PULMONARY EMBOLISM (HCC): Primary | ICD-10-CM

## 2023-01-12 DIAGNOSIS — C18.9 MALIGNANT NEOPLASM OF COLON, UNSPECIFIED PART OF COLON (HCC): ICD-10-CM

## 2023-01-12 DIAGNOSIS — K76.9 LIVER LESION: ICD-10-CM

## 2023-01-12 DIAGNOSIS — D50.0 IRON DEFICIENCY ANEMIA DUE TO CHRONIC BLOOD LOSS: ICD-10-CM

## 2023-01-12 DIAGNOSIS — R53.0 NEOPLASTIC MALIGNANT RELATED FATIGUE: ICD-10-CM

## 2023-01-12 PROCEDURE — 3078F DIAST BP <80 MM HG: CPT | Performed by: INTERNAL MEDICINE

## 2023-01-12 PROCEDURE — 3074F SYST BP LT 130 MM HG: CPT | Performed by: INTERNAL MEDICINE

## 2023-01-12 PROCEDURE — 99205 OFFICE O/P NEW HI 60 MIN: CPT | Performed by: INTERNAL MEDICINE

## 2023-01-12 PROCEDURE — 1123F ACP DISCUSS/DSCN MKR DOCD: CPT | Performed by: INTERNAL MEDICINE

## 2023-01-12 NOTE — LETTER
[facility] of [facility] [address]  [address]  Phone: [phone]  Fax: [phone]    Pankaj Avalos MD    January 12, 2023     [facility], [address] [place] [id number]    Patient: Ronnell Reddy   MR Number: [id number]   YOB: 1945   Date of Visit: 1/12/2023       Dear [facility]: Thank you for referring Isatu Yi to me for evaluation/treatment. Below are the relevant portions of my assessment and plan of care. If you have questions, please do not hesitate to call me. I look forward to following Tellydonatoanel Mas along with you.     Sincerely,      Pankaj Avalos MD

## 2023-01-12 NOTE — PROGRESS NOTES
Patient ID: Chito Ayoub, 1945, [id number], 76 y.o. Referred by :  No ref. provider found   Reason for consultation: Liver lesion/recurrent thromboembolism/iron deficient anemia      HISTORY OF PRESENT ILLNESS:    Oncologic History:    Chito Ayoub is a very pleasant 76 y.o. female. Referred for recurrent pulmonary embolism liver lesion and iron deficiency anemia rule out malignancy  Natasha Ayala was diagnosed with right upper extremity DVT on April 2022 which was found when she had a mammogram done and it was apparently an incidental finding she was referred to vascular surgery and she was seen by Dr. Abhilash Ervin at that time she had received anticoagulation with Xarelto for about 2 to 3 months according to patient it was discontinue. On 12/17/2022 she presented to emergency room at that time with right lower extremity swelling pain and redness at that time she was found to have acute DVT which involved right femoral-popliteal tibial and peroneal veins. Patient also had a CTA chest done at that time and CT chest was significant for bilateral pulm embolism left upper/lower lobe segmental/subsegmental and right pulmonary artery segmental branches pulm embolism. She was also found to have a right hepatic about 3.1 cm exophytic posterior inferior right lower lobe of liver hypodensity/mass and MRI of the liver was recommended at that time. Patient at that time was admitted to hospital for 3 days started on anticoagulation and currently she is on Xarelto.   CBC compatible with microcytic anemia and patient does have hemorrhoids but no active bleed         Past Medical History:   Diagnosis Date    Arthritis     Blood clot in vein 04/2022    on Xarelto for two months per patient    Brain bleed ([address] 1983    Cataract     DVT (deep venous thrombosis) ([facility] [address]     History of hip replacement     History of total bilateral knee replacement     Hypertension        Past Surgical History:   Procedure Laterality Date COLONOSCOPY      JOINT REPLACEMENT Bilateral     knees and hips       Allergies   Allergen Reactions    Sulfamethoxazole-Trimethoprim Rash    Aspirin Other (See Comments)     H/O brain bleed in 1983; told to never take aspirin products      Sulfa Antibiotics Rash       Current Outpatient Medications   Medication Sig Dispense Refill    rivaroxaban (XARELTO) 20 MG TABS tablet Take 1 tablet by mouth Daily with supper 30 tablet 5    Lifitegrast (XIIDRA OP) Apply 1 drop to eye in the morning and at bedtime      vitamin C (ASCORBIC ACID) 500 MG tablet Take 500 mg by mouth daily      Glycerin-Polysorbate 80 (REFRESH DRY EYE THERAPY OP) Apply 1 drop to eye as needed      oxybutynin (DITROPAN XL) 15 MG extended release tablet Take 1 tablet by mouth daily 30 tablet 11    Multiple Vitamins-Minerals (DRY EYE FORMULA PO)       omeprazole (PRILOSEC) 20 MG capsule Take 20 mg by mouth daily. atenolol-chlorthalidone (TENORETIC) 100-25 MG per tablet Take 1 tablet by mouth daily. sertraline (ZOLOFT) 50 MG tablet Take 50 mg by mouth daily. Omega-3 Fatty Acids (FISH OIL) 1200 MG CAPS Take 1 tablet by mouth.      vitamin E 1000 UNITS capsule Take 1,000 Units by mouth daily. calcium carbonate 600 MG TABS tablet Take 1 tablet by mouth 2 times daily. rivaroxaban (XARELTO) 15 MG TABS tablet Take 1 tablet by mouth 2 times daily (with meals) for 18 days 36 tablet 0     No current facility-administered medications for this visit. Social History     Socioeconomic History    Marital status:       Spouse name: Not on file    Number of children: Not on file    Years of education: Not on file    Highest education level: Not on file   Occupational History    Not on file   Tobacco Use    Smoking status: Never    Smokeless tobacco: Never   Substance and Sexual Activity    Alcohol use: Never    Drug use: Yes     Types: Marijuana Chrissbrian Ferrara)     Comment: Medical marijuana; rarely uses    Sexual activity: Not Currently Partners: Male   Other Topics Concern    Not on file   Social History Narrative    Not on file     Social Determinants of Health     Financial Resource Strain: Not on file   Food Insecurity: Not on file   Transportation Needs: Not on file   Physical Activity: Not on file   Stress: Not on file   Social Connections: Not on file   Intimate Partner Violence: Not on file   Housing Stability: Not on file       Family History   Problem Relation Age of Onset    Deep Vein Thrombosis Mother         REVIEW OF SYSTEM:     Constitutional: No fever or chills. No night sweats, no weight loss   Eyes: No eye discharge, double vision, or eye pain   HEENT: negative for sore mouth, sore throat, hoarseness and voice change   Respiratory: negative for cough , sputum, dyspnea, wheezing, hemoptysis, chest pain   Cardiovascular: negative for chest pain, dyspnea, palpitations, orthopnea, PND   Gastrointestinal: negative for nausea, vomiting, diarrhea, constipation, abdominal pain, Dysphagia, hematemesis and hematochezia   Genitourinary: negative for frequency, dysuria, nocturia, urinary incontinence, and hematuria   Integument: negative for rash, skin lesions, bruises.    Hematologic/Lymphatic: negative for easy bruising, bleeding, lymphadenopathy, petechiae and swelling/edema   Endocrine: negative for heat or cold intolerance, tremor, weight changes, change in bowel habits and hair loss   Musculoskeletal: negative for myalgias, arthralgias, pain, joint swelling,and bone pain   Neurological: negative for headaches, dizziness, seizures, weakness, numbness       OBJECTIVE:         Vitals:    01/12/23 1447   BP: 129/78   Pulse: 60   Resp: 18   Temp: 97.3 °F (36.3 °C)       PHYSICAL EXAM:   General appearance - well appearing, no in pain or distress   Mental status - alert and cooperative   Eyes - pupils equal and reactive, extraocular eye movements intact   Ears - bilateral TM's and external ear canals normal   Mouth - mucous membranes moist, pharynx normal without lesions   Neck - supple, no significant adenopathy   Lymphatics - no palpable lymphadenopathy, no hepatosplenomegaly   Chest - clear to auscultation, no wheezes, rales or rhonchi, symmetric air entry   Heart - normal rate, regular rhythm, normal S1, S2, no murmurs, rubs, clicks or gallops   Abdomen - soft, nontender, nondistended, no masses or organomegaly   Neurological - alert, oriented, normal speech, no focal findings or movement disorder noted   Musculoskeletal - no joint tenderness, deformity or swelling   Extremities - peripheral pulses normal, no pedal edema, no clubbing or cyanosis   Skin - normal coloration and turgor, no rashes, no suspicious skin lesions noted ,      LABORATORY DATA:     Lab Results   Component Value Date    WBC 7.3 12/17/2022    HGB 9.9 (L) 12/17/2022    HCT 34.0 (L) 12/17/2022    MCV 79.1 (L) 12/17/2022     12/17/2022    LYMPHOPCT 12 (L) 12/17/2022    RBC 4.30 12/17/2022    MCH 23.0 (L) 12/17/2022    MCHC 29.1 12/17/2022    RDW 15.9 (H) 12/17/2022    MONOPCT 9 12/17/2022    BASOPCT 0 12/17/2022    NEUTROABS 5.50 12/17/2022    LYMPHSABS 0.87 (L) 12/17/2022    MONOSABS 0.66 12/17/2022    EOSABS 0.17 12/17/2022    BASOSABS <0.03 12/17/2022         Chemistry        Component Value Date/Time     12/19/2022 0825    K 4.2 12/19/2022 0825     12/19/2022 0825    CO2 32 (H) 12/19/2022 0825    BUN 19 12/19/2022 0825    CREATININE 1.00 (H) 12/19/2022 0825        Component Value Date/Time    CALCIUM 10.5 (H) 12/19/2022 0825    ALKPHOS 71 11/07/2016 0959    AST 23 11/07/2016 0959    ALT 20 11/07/2016 0959    BILITOT 0.20 (L) 11/07/2016 0959            PATHOLOGY DATA:         IMAGING DATA:      VL DUP LOWER EXTREMITY VENOUS [facility]   Vascular Lower Extremities DVT Study Procedure      Patient Name  Kim Mendez       Date of Study           12/17/2022                 FRANCISCO TRAN      Date of Birth 1945   Gender                  Female Age           76 year(s)   Race                          Room Number   0320      Corporate ID  [id number]   #      Patient Acct  [de-identified]   #      MR #          [id number]       Sonographer             Lopez Anderson      Accession #   [id number]  Interpreting Physician  Selena Maxwell MD      Referring                  Referring Physician     ALBAN Lee   Nurse                                              Victorina JACOBS   Practitioner     Procedure  Type of Study:      Veins: Lower Extremities DVT Study, Venous Scan Lower Right. Patient Status:STAT. Conclusions      Summary      Acute deep vein thrombosis of the right leg involving the femoral   popliteal, tibial, peroneal veins. Superficial thrombophlebitis of Right lower extremity is found to involve   the LSV, calf and ankle. Signature      ----------------------------------------------------------------   Electronically signed by RT Justin(R)(M)(CT)(CHINYERE)(Sonographer) on 12/17/2022 01:47 PM   ----------------------------------------------------------------      ----------------------------------------------------------------   Electronically signed by Selena Maxwell MD(Interpreting   physician) on 12/19/2022 06:25 PM   ----------------------------------------------------------------     Findings:      Right Impression:                                   Left Impression:   The common femoral, superficial femoral proximal    Left groin scanned for   and mid, greater saphenous veins, profunda are      comparison appears   patent, compressible with normal doppler responses. normal.   DVT in the distal FV, POP, PTV and peroneal v. Superficial thrombus of the lessor saphenous vein,   calf and ankle. Ankle edema noted. Duplex scan using B-mode, Gray scale imaging.    Spectral doppler analysis and color flow throughout   the exam.     Velocities are measured in cm/s ; Diameters are measured in cm    Right Lower Extremities DVT Study Measurements  Right 2D Measurements  +------------------------------------+----------+---------------+----------+  ! Location                            ! Visualized! Compressibility! Thrombosis! +------------------------------------+----------+---------------+----------+  ! Common Femoral                      !Yes       ! Yes            ! None      !  +------------------------------------+----------+---------------+----------+  ! Prox Femoral                        !Yes       ! Yes            ! None      !  +------------------------------------+----------+---------------+----------+  ! Mid Femoral                         !Yes       ! Yes            ! None      !  +------------------------------------+----------+---------------+----------+  ! Dist Femoral                        !Yes       ! No             !Acute     !  +------------------------------------+----------+---------------+----------+  ! Deep Femoral                        !Yes       ! Yes            ! None      !  +------------------------------------+----------+---------------+----------+  ! Popliteal                           !Yes       ! No             !Acute     !  +------------------------------------+----------+---------------+----------+  ! Sapheno Femoral Junction            ! Yes       ! Yes            ! None      !  +------------------------------------+----------+---------------+----------+  ! PTV                                 ! Yes       ! No             !Acute     !  +------------------------------------+----------+---------------+----------+  ! Peroneal                            !Yes       ! No             !Acute     !  +------------------------------------+----------+---------------+----------+  ! Gastroc                             ! Yes       ! Yes            ! None      !  +------------------------------------+----------+---------------+----------+  ! GSV Thigh                           ! Yes       ! Yes            ! None !  +------------------------------------+----------+---------------+----------+  ! GSV Knee                            ! Yes       ! Yes            ! None      !  +------------------------------------+----------+---------------+----------+  ! GSV Ankle                           ! Yes       ! Yes            ! None      !  +------------------------------------+----------+---------------+----------+  ! SSV                                 ! Yes       ! Yes            ! None      !  +------------------------------------+----------+---------------+----------+    Right Doppler Measurements  +-------------------------+----------+------+------------------------------+  ! Location                 ! Signal    !Reflux! Reflux (msec)                 ! +-------------------------+----------+------+------------------------------+  ! Common Femoral           !Phasic    ! No    !                              !  +-------------------------+----------+------+------------------------------+  ! Prox Femoral             !Phasic    ! No    !                              !  +-------------------------+----------+------+------------------------------+  ! Popliteal                !Diminished! No    !                              !  +-------------------------+----------+------+------------------------------+    Left Lower Extremities DVT Study Measurements  Left 2D Measurements  +------------------------------------+----------+---------------+----------+  ! Location                            ! Visualized! Compressibility! Thrombosis! +------------------------------------+----------+---------------+----------+  ! Common Femoral                      !Yes       ! Yes            ! None      !  +------------------------------------+----------+---------------+----------+       ASSESSMENT:       Diagnosis Orders   1. Bilateral pulmonary embolism ([address]        2. Liver lesion  MRI ABDOMEN W WO CONTRAST    CT ABDOMEN PELVIS W IV CONTRAST Additional Contrast? Oral      3.  Iron deficiency anemia due to chronic blood loss  Neeraj Rg MD, Gastroenterology, [place]    Ferritin    Vitamin B12 & Folate    Iron and TIBC    CEA      4. Neoplastic malignant related fatigue  Neeraj Rg MD, Gastroenterology, [place]    Ferritin    Vitamin B12 & Folate    Iron and TIBC    CEA      5. Malignant neoplasm of colon, unspecified part of colon ([facility] [address]  CT ABDOMEN PELVIS W IV CONTRAST Additional Contrast? Oral               PLAN:     I personally reviewed results of lab work-up imaging studies,outside records and other relevant clinical data. I had a detailed discussion with the patient. I explained the significance of these abnormalities and possible etiology and management options   Bilateral pulmonary embolism/recurrent DVT at this time unprovoked, malignancy need to be ruled out especially with other finding and with having recurrent embolism need lifelong anticoagulation if no contraindication likely bleed  Microcytic anemia/blood loss anemia currently patient on anticoagulation;I will not hold anticoagulation as there is no active bleed and will refer patient for GI work-up to be done as soon as possible meanwhile monitor H&H closely and if to stop anticoagulation need IVC filter  Work-up for malignancy with endoscopy and colonoscopy, CT abdomen pelvis  Liver MRI  CEA and iron panel and anemia work-up  Intolerance to oral iron> IV iron after above  RTC in 2 weeks    Thank you for the consult                                    [facility]                            This note is created with the assistance of a speech recognition program.  While intending to generate a document that actually reflects the content of the visit, the document can still have some errors including those of syntax and sound a like substitutions which may escape proof reading.   It such instances, actual meaning can be extrapolated by contextual diversion.

## 2023-01-14 LAB
BUN BLDV-MCNC: 21 MG/DL (ref 8–23)
CEA: 2.6 NG/ML
CREAT SERPL-MCNC: 1.18 MG/DL (ref 0.6–1.3)
EGFR IF NONAFRICAN AMERICAN: 47 ML/MIN/1.73
FERRITIN: 13 NG/ML (ref 13–200)
FOLATE: 32.8 UG/L
IRON SATURATION: 4 % (ref 20–50)
IRON, SERUM: 19 UG/DL (ref 37–145)
TOTAL IRON BINDING CAPACITY: 443 UG/DL (ref 250–450)
UNSATURATED IRON BINDING CAPACITY: 424 UG/DL (ref 112–347)
VITAMIN B-12: 439 PG/ML (ref 200–950)

## 2023-01-27 ENCOUNTER — TELEPHONE (OUTPATIENT)
Dept: GASTROENTEROLOGY | Age: 78
End: 2023-01-27

## 2023-01-27 ENCOUNTER — OFFICE VISIT (OUTPATIENT)
Dept: GASTROENTEROLOGY | Age: 78
End: 2023-01-27

## 2023-01-27 VITALS
RESPIRATION RATE: 18 BRPM | TEMPERATURE: 97.6 F | HEART RATE: 59 BPM | BODY MASS INDEX: 36.15 KG/M2 | HEIGHT: 65 IN | WEIGHT: 217 LBS | DIASTOLIC BLOOD PRESSURE: 68 MMHG | SYSTOLIC BLOOD PRESSURE: 132 MMHG

## 2023-01-27 DIAGNOSIS — Z01.818 PRE-PROCEDURAL EXAMINATION: Primary | ICD-10-CM

## 2023-01-27 DIAGNOSIS — D50.9 IRON DEFICIENCY ANEMIA, UNSPECIFIED IRON DEFICIENCY ANEMIA TYPE: Primary | ICD-10-CM

## 2023-01-27 RX ORDER — POLYETHYLENE GLYCOL 3350, SODIUM CHLORIDE, SODIUM BICARBONATE, POTASSIUM CHLORIDE 420; 11.2; 5.72; 1.48 G/4L; G/4L; G/4L; G/4L
4000 POWDER, FOR SOLUTION ORAL ONCE
Qty: 4000 ML | Refills: 0 | Status: SHIPPED | OUTPATIENT
Start: 2023-01-27 | End: 2023-01-27

## 2023-01-27 ASSESSMENT — ENCOUNTER SYMPTOMS
APNEA: 1
ABDOMINAL DISTENTION: 0

## 2023-01-27 NOTE — PATIENT INSTRUCTIONS
SURVEY:    You may be receiving a survey from [employer] regarding your visit today. Please complete the survey to enable us to provide the highest quality of care to you and your family. If you cannot score us a very good on any question, please call the office to discuss how we could have made your experience a very good one. Thank you.

## 2023-01-27 NOTE — PROGRESS NOTES
[address]  [address]    Chief Complaint   Patient presents with    New Patient     New pt here for iron deficiency anemia; pt states occasional blood in stool from hemorrhoids         HPI  Pacheco TRANAlize Boyle is a 76year old woman with a past medical history of pulmonary embolism, deep vein thrombosis, arthritis who presents for iron deficiency anemia. She states that her stools turned dark when she started taking iron pills - they are normal at this time. OSH 08/13/2019  EGD  Hiatal hernia  Inflamed esophagus    Colonoscopy  Hemorrhoids  Diverticula      Family history of colon cancer: She believes her father may have had a colon resection from colon cancer. Blood in stool: Currently, no.  Endorses history of hemorrhoids  Unintentional weight loss: No  Abdominal pain: No  Prior colonoscopy: Yes - 08/2019    Past Medical History:   Diagnosis Date    Arthritis     Blood clot in vein 04/2022    on Xarelto for two months per patient    Brain bleed ([facility] [address] 1983    Cataract     DVT (deep venous thrombosis) ([facility] [address]     History of hip replacement     History of total bilateral knee replacement     Hypertension     Sleep apnea          Past Surgical History:   Procedure Laterality Date    COLONOSCOPY  08/13/2019    JOINT REPLACEMENT Bilateral     knees and hips         Current Outpatient Medications   Medication Sig Dispense Refill    albuterol sulfate (PROAIR RESPICLICK) 950 (90 Base) MCG/ACT aerosol powder inhalation Inhale into the lungs every 4 hours as needed for Wheezing or Shortness of Breath      rivaroxaban (XARELTO) 20 MG TABS tablet Take 1 tablet by mouth Daily with supper 30 tablet 5    Lifitegrast (XIIDRA OP) Apply 1 drop to eye in the morning and at bedtime      vitamin C (ASCORBIC ACID) 500 MG tablet Take 500 mg by mouth daily      Glycerin-Polysorbate 80 (REFRESH DRY EYE THERAPY OP) Apply 1 drop to eye as needed      oxybutynin (DITROPAN XL) 15 MG extended release tablet Take 1 tablet by mouth daily 30 tablet 11    Multiple Vitamins-Minerals (DRY EYE FORMULA PO)       omeprazole (PRILOSEC) 20 MG capsule Take 20 mg by mouth in the morning and at bedtime      atenolol-chlorthalidone (TENORETIC) 100-25 MG per tablet Take 1 tablet by mouth daily. sertraline (ZOLOFT) 50 MG tablet Take 50 mg by mouth daily. Omega-3 Fatty Acids (FISH OIL) 1200 MG CAPS Take 1 tablet by mouth.      calcium carbonate 600 MG TABS tablet Take 1 tablet by mouth 2 times daily. vitamin E 1000 UNITS capsule Take 1,000 Units by mouth daily. (Patient not taking: Reported on 1/27/2023)       No current facility-administered medications for this visit. Family History   Problem Relation Age of Onset    Deep Vein Thrombosis Mother     Colon Cancer Nephew           Social Determinants of Health     Tobacco Use: Low Risk     Smoking Tobacco Use: Never    Smokeless Tobacco Use: Never    Passive Exposure: Not on file   Alcohol Use: Not on file   Financial Resource Strain: Not on file   Food Insecurity: Not on file   Transportation Needs: Not on file   Physical Activity: Not on file   Stress: Not on file   Social Connections: Not on file   Intimate Partner Violence: Not on file   Depression: Not on file   Housing Stability: Not on file       Review of Systems   Respiratory:  Positive for apnea. She states that she has severe sleep apnea    Cardiovascular:         Hypertension   Gastrointestinal:  Negative for abdominal distention. Musculoskeletal:  Positive for arthralgias. BL Knee and hip replacement   Hematological:         History of  right leg DVT   Pulmonary embolism   All other systems reviewed and are negative. /68 (Site: Left Upper Arm, Position: Sitting, Cuff Size: Large Adult)   Pulse 59   Temp 97.6 °F (36.4 °C) (Temporal)   Resp 18   Ht 5' 5\" (1.651 m)   Wt 217 lb (98.4 kg)   BMI 36.11 kg/m²     Physical Exam  Constitutional:       Appearance: Normal appearance. HENT:      Head: Normocephalic. Right Ear: External ear normal.      Left Ear: External ear normal.      Nose: Nose normal.      Mouth/Throat:      Mouth: Mucous membranes are moist.   Eyes:      Extraocular Movements: Extraocular movements intact. Pupils: Pupils are equal, round, and reactive to light. Cardiovascular:      Rate and Rhythm: Normal rate. Pulses: Normal pulses. Pulmonary:      Effort: Pulmonary effort is normal.      Breath sounds: Normal breath sounds. Abdominal:      General: Bowel sounds are normal.      Palpations: Abdomen is soft. Musculoskeletal:      Cervical back: Normal range of motion. Comments: Slowed gait   Skin:     General: Skin is warm. Neurological:      General: No focal deficit present. Mental Status: She is alert and oriented to person, place, and time. Psychiatric:         Mood and Affect: Mood normal.         Lab Results   Component Value Date    WBC 7.3 12/17/2022    HGB 9.9 (L) 12/17/2022    HCT 34.0 (L) 12/17/2022    MCV 79.1 (L) 12/17/2022     12/17/2022        Lab Results   Component Value Date     12/19/2022    K 4.2 12/19/2022     12/19/2022    CO2 32 (H) 12/19/2022    BUN 21 01/13/2023    CREATININE 1.18 01/13/2023    GLUCOSE 116 (H) 12/19/2022    CALCIUM 10.5 (H) 12/19/2022    PROT 7.4 11/07/2016    LABALBU 4.1 11/07/2016    BILITOT 0.20 (L) 11/07/2016    ALKPHOS 71 11/07/2016    AST 23 11/07/2016    ALT 20 11/07/2016    LABGLOM 57 (L) 12/19/2022    GFRAA >60 11/07/2016          Lab Results   Component Value Date    INR 1.1 12/17/2022    PROTIME 14.5 (H) 12/17/2022     CT of chest pulmonary embolism 12/17/2022  Upper Abdomen: Multiple scattered hepatic cysts up to 6.2 cm. A 2.3 x 3.1 cm   exophytic hypodense nodule along posterior aspect inferior right lobe of   liver demonstrates CT density up to 40 Hounsfield units not consistent with a   simple cyst.  Suggest further evaluation with non emergency MRI. SLEEP STUDY 01/27/2023  1. Obstructive sleep apnea syndrome. 2.  Hypothyroidism. 3.  Hypertension. Assessment    Serg Mckeon. Katie Pereira is a 76year old woman with a past medical history of pulmonary embolism, deep vein thrombosis, arthritis who presents for iron deficiency anemia. Colonoscopy and EGD from 08/2019 showed a internal hemorrhoids/diverticulosis and a hiatal hernia. She will need a repeat EGD and colonoscopy to rule out peptic ulcer, cancer, [place] lesions, AVMs among others. Mallampati score 2  ASA 2    Plan    1. Iron deficiency anemia, unspecified iron deficiency anemia type  - EGD; Future  - COLONOSCOPY (Screening); Future  - polyethylene glycol-electrolytes (NULYTELY) 420 g solution; Take 4,000 mLs by mouth once for 1 dose  Dispense: 4000 mL; Refill: 0    2. Liver hypodensity:  - Incidental findings with chest CT which was to rule out           pulmonary embolism. - MRI/CT of abdomen ordered by Dr. Melinda Lindquist. - Pending    3. F/U based on findings    Informed consent was obtained with a discussion about potential risks and complications of the procedure. Patient verbalized understanding and willingness to continue with the procedure scheduling. Spent 20 minutes with the patient with greater than 50 percent of the time was spent on face-to-face time in discussion with the patient regarding diagnostic options/results, treatment options, counseling, and follow-up plan.       Franki Tan MD

## 2023-01-30 ENCOUNTER — HOSPITAL ENCOUNTER (OUTPATIENT)
Dept: CT IMAGING | Age: 78
Discharge: HOME | End: 2023-02-01
Payer: MEDICARE

## 2023-01-30 DIAGNOSIS — C18.9 MALIGNANT NEOPLASM OF COLON, UNSPECIFIED PART OF COLON (HCC): ICD-10-CM

## 2023-01-30 DIAGNOSIS — K76.9 LIVER LESION: ICD-10-CM

## 2023-01-30 PROCEDURE — 74177 CT ABD & PELVIS W/CONTRAST: CPT

## 2023-01-30 PROCEDURE — 6360000004 HC RX CONTRAST MEDICATION: Performed by: INTERNAL MEDICINE

## 2023-01-30 RX ADMIN — IOPAMIDOL 75 ML: 755 INJECTION, SOLUTION INTRAVENOUS at 13:46

## 2023-01-30 RX ADMIN — IOPAMIDOL 18 ML: 755 INJECTION, SOLUTION INTRAVENOUS at 13:46

## 2023-01-30 NOTE — TELEPHONE ENCOUNTER
Sent letter to Dr Rosanna Garzon to [facility] for 3 days prior ,called patient to hold medication she voiced understanding

## 2023-02-01 RX ORDER — OXYBUTYNIN CHLORIDE 15 MG/1
TABLET, EXTENDED RELEASE ORAL
Qty: 30 TABLET | Refills: 11 | OUTPATIENT
Start: 2023-02-01

## 2023-02-07 ENCOUNTER — HOSPITAL ENCOUNTER (OUTPATIENT)
Dept: MRI IMAGING | Age: 78
Discharge: HOME | End: 2023-02-09
Payer: MEDICARE

## 2023-02-07 DIAGNOSIS — K76.9 LIVER LESION: ICD-10-CM

## 2023-02-07 PROCEDURE — A9579 GAD-BASE MR CONTRAST NOS,1ML: HCPCS | Performed by: INTERNAL MEDICINE

## 2023-02-07 PROCEDURE — 6360000004 HC RX CONTRAST MEDICATION: Performed by: INTERNAL MEDICINE

## 2023-02-07 PROCEDURE — 74183 MRI ABD W/O CNTR FLWD CNTR: CPT

## 2023-02-07 RX ADMIN — GADOTERIDOL 20 ML: 279.3 INJECTION, SOLUTION INTRAVENOUS at 10:42

## 2023-02-13 ENCOUNTER — OFFICE VISIT (OUTPATIENT)
Dept: ONCOLOGY | Age: 78
End: 2023-02-13
Payer: MEDICARE

## 2023-02-13 VITALS
TEMPERATURE: 98 F | RESPIRATION RATE: 18 BRPM | HEART RATE: 61 BPM | SYSTOLIC BLOOD PRESSURE: 122 MMHG | HEIGHT: 65 IN | DIASTOLIC BLOOD PRESSURE: 58 MMHG | BODY MASS INDEX: 35.32 KG/M2 | WEIGHT: 212 LBS

## 2023-02-13 DIAGNOSIS — N18.2 CHRONIC KIDNEY DISEASE, STAGE 2 (MILD): Primary | ICD-10-CM

## 2023-02-13 DIAGNOSIS — K76.89 LIVER CYST: ICD-10-CM

## 2023-02-13 DIAGNOSIS — Z12.11 SCREENING FOR COLON CANCER: ICD-10-CM

## 2023-02-13 DIAGNOSIS — D50.0 IRON DEFICIENCY ANEMIA DUE TO CHRONIC BLOOD LOSS: ICD-10-CM

## 2023-02-13 DIAGNOSIS — N28.1 KIDNEY CYST, ACQUIRED: ICD-10-CM

## 2023-02-13 DIAGNOSIS — D50.0 IRON DEFICIENCY ANEMIA DUE TO CHRONIC BLOOD LOSS: Primary | ICD-10-CM

## 2023-02-13 DIAGNOSIS — I26.99 BILATERAL PULMONARY EMBOLISM (HCC): ICD-10-CM

## 2023-02-13 PROCEDURE — 3074F SYST BP LT 130 MM HG: CPT | Performed by: INTERNAL MEDICINE

## 2023-02-13 PROCEDURE — 1123F ACP DISCUSS/DSCN MKR DOCD: CPT | Performed by: INTERNAL MEDICINE

## 2023-02-13 PROCEDURE — 3078F DIAST BP <80 MM HG: CPT | Performed by: INTERNAL MEDICINE

## 2023-02-13 PROCEDURE — 99215 OFFICE O/P EST HI 40 MIN: CPT | Performed by: INTERNAL MEDICINE

## 2023-02-13 RX ORDER — SODIUM CHLORIDE 9 MG/ML
INJECTION, SOLUTION INTRAVENOUS CONTINUOUS
OUTPATIENT
Start: 2023-02-20

## 2023-02-13 RX ORDER — SODIUM CHLORIDE 9 MG/ML
5-250 INJECTION, SOLUTION INTRAVENOUS PRN
OUTPATIENT
Start: 2023-02-20

## 2023-02-13 RX ORDER — ONDANSETRON 2 MG/ML
8 INJECTION INTRAMUSCULAR; INTRAVENOUS
OUTPATIENT
Start: 2023-02-20

## 2023-02-13 RX ORDER — FAMOTIDINE 10 MG/ML
20 INJECTION, SOLUTION INTRAVENOUS
OUTPATIENT
Start: 2023-02-20

## 2023-02-13 RX ORDER — HEPARIN SODIUM (PORCINE) LOCK FLUSH IV SOLN 100 UNIT/ML 100 UNIT/ML
500 SOLUTION INTRAVENOUS PRN
OUTPATIENT
Start: 2023-02-20

## 2023-02-13 RX ORDER — SODIUM CHLORIDE 0.9 % (FLUSH) 0.9 %
5-40 SYRINGE (ML) INJECTION PRN
OUTPATIENT
Start: 2023-02-20

## 2023-02-13 RX ORDER — ACETAMINOPHEN 325 MG/1
650 TABLET ORAL
OUTPATIENT
Start: 2023-02-20

## 2023-02-13 RX ORDER — DIPHENHYDRAMINE HYDROCHLORIDE 50 MG/ML
50 INJECTION INTRAMUSCULAR; INTRAVENOUS
OUTPATIENT
Start: 2023-02-20

## 2023-02-13 RX ORDER — EPINEPHRINE 1 MG/ML
0.3 INJECTION, SOLUTION, CONCENTRATE INTRAVENOUS PRN
OUTPATIENT
Start: 2023-02-20

## 2023-02-13 RX ORDER — ALBUTEROL SULFATE 90 UG/1
4 AEROSOL, METERED RESPIRATORY (INHALATION) PRN
OUTPATIENT
Start: 2023-02-20

## 2023-02-13 NOTE — PROGRESS NOTES
Patient ID: Torey Mills, 1945, [id number], 76 y.o. Referred by :  No ref. provider found   Reason for consultation: Liver lesion/recurrent thromboembolism/iron deficient anemia      HISTORY OF PRESENT ILLNESS:    Oncologic History:    Torey Mills is a very pleasant 76 y.o. female. Referred for recurrent pulmonary embolism liver lesion and iron deficiency anemia rule out malignancy  Tiny Hanson was diagnosed with right upper extremity DVT on April 2022 which was found when she had a mammogram done and it was apparently an incidental finding she was referred to vascular surgery and she was seen by Dr. Valentino Sings at that time she had received anticoagulation with Xarelto for about 2 to 3 months according to patient it was discontinue. On 12/17/2022 she presented to emergency room at that time with right lower extremity swelling pain and redness at that time she was found to have acute DVT which involved right femoral-popliteal tibial and peroneal veins. Patient also had a CTA chest done at that time and CT chest was significant for bilateral pulm embolism left upper/lower lobe segmental/subsegmental and right pulmonary artery segmental branches pulm embolism. She was also found to have a right hepatic about 3.1 cm exophytic posterior inferior right lower lobe of liver hypodensity/mass and MRI of the liver was recommended at that time. Patient at that time was admitted to hospital for 3 days started on anticoagulation and currently she is on Xarelto. CBC compatible with microcytic anemia and patient does have hemorrhoids but no active bleed    Interval history  Patient presents to the clinic for a follow-up visit and to discuss results of lab work-up and other relevant clinical data. Overall patient has been tolerating treatment without unexpected or severe side effects. During this visit patient's allergy, social, medical, surgical history and medications were reviewed and updated.      Anemia work-up compatible of iron deficiency anemia  CEA in the normal limit  Hesitant to have endoscopy and colonoscopy however scheduled for June  Scan of the abdomen and MRI of the liver> hemorrhagic cyst in the kidney and cyst in the liver  Weakness fatigue         Past Medical History:   Diagnosis Date    Arthritis     Blood clot in vein 04/2022    on Xarelto for two months per patient    Brain bleed ([facility] [address] 1983    Cataract     DVT (deep venous thrombosis) ([facility] [address]     History of hip replacement     History of total bilateral knee replacement     Hypertension     Sleep apnea        Past Surgical History:   Procedure Laterality Date    COLONOSCOPY  08/13/2019    JOINT REPLACEMENT Bilateral     knees and hips       Allergies   Allergen Reactions    Sulfamethoxazole-Trimethoprim Rash    Aspirin Other (See Comments)     H/O brain bleed in 1983; told to never take aspirin products      Iron Nausea Only     OTC    Sulfa Antibiotics Rash       Current Outpatient Medications   Medication Sig Dispense Refill    rivaroxaban (XARELTO) 20 MG TABS tablet Take 1 tablet by mouth Daily with supper 30 tablet 5    Lifitegrast (XIIDRA OP) Apply 1 drop to eye in the morning and at bedtime      vitamin C (ASCORBIC ACID) 500 MG tablet Take 500 mg by mouth daily      Glycerin-Polysorbate 80 (REFRESH DRY EYE THERAPY OP) Apply 1 drop to eye as needed      oxybutynin (DITROPAN XL) 15 MG extended release tablet Take 1 tablet by mouth daily 30 tablet 11    Multiple Vitamins-Minerals (DRY EYE FORMULA PO)       omeprazole (PRILOSEC) 20 MG capsule Take 20 mg by mouth in the morning and at bedtime      atenolol-chlorthalidone (TENORETIC) 100-25 MG per tablet Take 1 tablet by mouth daily. sertraline (ZOLOFT) 50 MG tablet Take 50 mg by mouth daily. Omega-3 Fatty Acids (FISH OIL) 1200 MG CAPS Take 1 tablet by mouth.      calcium carbonate 600 MG TABS tablet Take 1 tablet by mouth 2 times daily.       albuterol sulfate (PROAIR RESPICLICK) 729 (90 Base) MCG/ACT aerosol powder inhalation Inhale into the lungs every 4 hours as needed for Wheezing or Shortness of Breath (Patient not taking: Reported on 2/13/2023)      vitamin E 1000 UNITS capsule Take 1,000 Units by mouth daily. (Patient not taking: No sig reported)       No current facility-administered medications for this visit. Social History     Socioeconomic History    Marital status:      Spouse name: Not on file    Number of children: Not on file    Years of education: Not on file    Highest education level: Not on file   Occupational History    Not on file   Tobacco Use    Smoking status: Never    Smokeless tobacco: Never   Vaping Use    Vaping Use: Never used   Substance and Sexual Activity    Alcohol use: Never    Drug use: Not Currently     Types: Marijuana [employer])     Comment: Medical marijuana; rarely uses    Sexual activity: Not Currently     Partners: Male   Other Topics Concern    Not on file   Social History Narrative    Not on file     Social Determinants of Health     Financial Resource Strain: Not on file   Food Insecurity: Not on file   Transportation Needs: Not on file   Physical Activity: Not on file   Stress: Not on file   Social Connections: Not on file   Intimate Partner Violence: Not on file   Housing Stability: Not on file       Family History   Problem Relation Age of Onset    Deep Vein Thrombosis Mother     Colon Cancer Nephew         REVIEW OF SYSTEM:     Constitutional: No fever or chills.  No night sweats, no weight loss   Eyes: No eye discharge, double vision, or eye pain   HEENT: negative for sore mouth, sore throat, hoarseness and voice change   Respiratory: negative for cough , sputum, dyspnea, wheezing, hemoptysis, chest pain   Cardiovascular: negative for chest pain, dyspnea, palpitations, orthopnea, PND   Gastrointestinal: negative for nausea, vomiting, diarrhea, constipation, abdominal pain, Dysphagia, hematemesis and hematochezia   Genitourinary: negative for frequency, dysuria, nocturia, urinary incontinence, and hematuria   Integument: negative for rash, skin lesions, bruises.    Hematologic/Lymphatic: negative for easy bruising, bleeding, lymphadenopathy, petechiae and swelling/edema   Endocrine: negative for heat or cold intolerance, tremor, weight changes, change in bowel habits and hair loss   Musculoskeletal: negative for myalgias, arthralgias, pain, joint swelling,and bone pain   Neurological: negative for headaches, dizziness, seizures, weakness, numbness       OBJECTIVE:         Vitals:    02/13/23 1252   BP: (!) 122/58   Pulse: 61   Resp: 18   Temp: 98 °F (36.7 °C)       PHYSICAL EXAM:   General appearance - well appearing, no in pain or distress   Mental status - alert and cooperative   Eyes - pupils equal and reactive, extraocular eye movements intact   Ears - bilateral TM's and external ear canals normal   Mouth - mucous membranes moist, pharynx normal without lesions   Neck - supple, no significant adenopathy   Lymphatics - no palpable lymphadenopathy, no hepatosplenomegaly   Chest - clear to auscultation, no wheezes, rales or rhonchi, symmetric air entry   Heart - normal rate, regular rhythm, normal S1, S2, no murmurs, rubs, clicks or gallops   Abdomen - soft, nontender, nondistended, no masses or organomegaly   Neurological - alert, oriented, normal speech, no focal findings or movement disorder noted   Musculoskeletal - no joint tenderness, deformity or swelling   Extremities - peripheral pulses normal, no pedal edema, no clubbing or cyanosis   Skin - normal coloration and turgor, no rashes, no suspicious skin lesions noted ,      LABORATORY DATA:     Lab Results   Component Value Date    WBC 7.3 12/17/2022    HGB 9.9 (L) 12/17/2022    HCT 34.0 (L) 12/17/2022    MCV 79.1 (L) 12/17/2022     12/17/2022    LYMPHOPCT 12 (L) 12/17/2022    RBC 4.30 12/17/2022    MCH 23.0 (L) 12/17/2022    MCHC 29.1 12/17/2022    RDW 15.9 (H) 12/17/2022    MONOPCT 9 12/17/2022    BASOPCT 0 12/17/2022    NEUTROABS 5.50 12/17/2022    LYMPHSABS 0.87 (L) 12/17/2022    MONOSABS 0.66 12/17/2022    EOSABS 0.17 12/17/2022    BASOSABS <0.03 12/17/2022         Chemistry        Component Value Date/Time     12/19/2022 0825    K 4.2 12/19/2022 0825     12/19/2022 0825    CO2 32 (H) 12/19/2022 0825    BUN 21 01/13/2023 1040    CREATININE 1.18 01/13/2023 1040        Component Value Date/Time    CALCIUM 10.5 (H) 12/19/2022 0825    ALKPHOS 71 11/07/2016 0959    AST 23 11/07/2016 0959    ALT 20 11/07/2016 0959    BILITOT 0.20 (L) 11/07/2016 0959            PATHOLOGY DATA:         IMAGING DATA:      MRI ABDOMEN W WO CONTRAST  Narrative: EXAMINATION:  MRI OF THE ABDOMEN WITHOUT AND WITH CONTRAST, 2/7/2023 10:44 am    TECHNIQUE:  Multiplanar multisequence MRI of the abdomen was performed without and with  the administration of intravenous contrast.    COMPARISON:  CT abdomen pelvis 01/30/2023    HISTORY:  ORDERING SYSTEM PROVIDED HISTORY: Liver lesion  TECHNOLOGIST PROVIDED HISTORY:  STAT Creatinine as needed:->Yes  liver lesion  Specify organ?->Liver    FINDINGS:  Kidneys show multiple bilateral T1 hyperintense and T2 hypointense lesions  which demonstrate no appreciable enhancement and are consistent with  hemorrhagic/proteinaceous cysts. Some measure only a few mm. The 2 largest  are in the left kidney correlating with finding on the prior CT scan. Referencing axial T2 FSFRFSE series 8, these include exophytic 2 cm in the  medial midpole on image 8 and 2.4 cm exophytic lateral midpole on image 11.  also of note are multiple bilateral T1 hypointense and T2 hyperintense  nonenhancing cysts. Largest on right exophytic 4.9 cm in the lower pole and  on the left posterior 2.6 cm also lower pole. No suspicious renal lesion. Multiple scattered hepatic cysts manifested by nonenhancing T1 hypointense  and T2 hyperintense lesions largest in the left lobe measuring 5.7 cm.     Spleen, pancreas, adrenal glands and gallbladder show no significant  abnormalities. Visualized bowel show no acute process. No free intraperitoneal fluid. No  significant lymphadenopathy. No aggressive bone lesion. Impression: 1. Indeterminate left renal lesions identified on the prior CT are consistent  with hemorrhagic/proteinaceous cysts as discussed above. There are multiple  bilateral hemorrhagic/proteinaceous cysts with simple cysts. These require  no specific follow-up. 2. Multiple scattered hepatic cysts largest in the left lobe 5.7 cm. ASSESSMENT:       Diagnosis Orders   1. Chronic kidney disease, stage 2 (mild)        2. Bilateral pulmonary embolism ([address]        3. Iron deficiency anemia due to chronic blood loss        4. Liver cyst        5. Kidney cyst, acquired                 PLAN:     I personally reviewed results of lab work-up imaging studies,outside records and other relevant clinical data. I had a detailed discussion with the patient. I explained the significance of these abnormalities and possible etiology and management options   Bilateral pulmonary embolism/recurrent DVT at this time unprovoked, need lifelong anticoagulation if no contraindication likely bleed  Microcytic anemia/blood loss anemia currently patient on anticoagulation; this is related to blood loss could be from the hemorrhagic cyst, sent to GI for EGD and colonoscopy patient hesitant to have scope at this time will monitor CBC and iron panel in 3 months if still have evidence of iron deficiency anemia/bleeding need to have GI work-up  Side effect of IV iron discussed with the patient and willing to proceed  Weakness due to anemia/iron deficient anemia and not able to tolerate oral iron> IV iron  Cologuard  RTC in 3 months with repeated iron panel CBC        Thank you for the consult                                    [facility]                            This note is created with the assistance of a speech recognition program.  While intending to generate a document that actually reflects the content of the visit, the document can still have some errors including those of syntax and sound a like substitutions which may escape proof reading. It such instances, actual meaning can be extrapolated by contextual diversion.

## 2023-02-14 ENCOUNTER — TELEPHONE (OUTPATIENT)
Dept: GASTROENTEROLOGY | Age: 78
End: 2023-02-14

## 2023-02-14 PROBLEM — Z78.9 DRUG INTOLERANCE: Status: ACTIVE | Noted: 2023-02-14

## 2023-02-14 NOTE — TELEPHONE ENCOUNTER
Left message to call office regarding need to reschedule colonoscopy/egd that is scheduled for June 21st.    Instructed to call office for rescheduling

## 2023-02-15 NOTE — TELEPHONE ENCOUNTER
Contacted patient and discussed need to move June 21st colonoscopy/egd. Offered date in April - patient declined. She would like to keep in June - moved to June 27th. Patient states she will be starting iron infusions and having additional blood work and does not know for sure if she will need to proceed with the colonoscopy/egd. Reports CT shows no cancer. She will contact office as it gets closer to inform if she will be proceeding with the procedures or not. States if she proceeds would like Rx for Suprep as she does not tolerate Golytely. Will order and give instructions once determined if she is going to proceed.

## 2023-02-16 ENCOUNTER — APPOINTMENT (OUTPATIENT)
Dept: GENERAL RADIOLOGY | Age: 78
End: 2023-02-16
Payer: MEDICARE

## 2023-02-16 ENCOUNTER — HOSPITAL ENCOUNTER (OUTPATIENT)
Age: 78
Setting detail: OBSERVATION
Discharge: HOME | End: 2023-02-17
Attending: STUDENT IN AN ORGANIZED HEALTH CARE EDUCATION/TRAINING PROGRAM | Admitting: INTERNAL MEDICINE
Payer: MEDICARE

## 2023-02-16 DIAGNOSIS — R77.8 ELEVATED TROPONIN: ICD-10-CM

## 2023-02-16 DIAGNOSIS — R07.9 CHEST PAIN, UNSPECIFIED TYPE: Primary | ICD-10-CM

## 2023-02-16 LAB
ABSOLUTE EOS #: 0.26 K/UL (ref 0–0.44)
ABSOLUTE IMMATURE GRANULOCYTE: 0.04 K/UL (ref 0–0.3)
ABSOLUTE LYMPH #: 1.65 K/UL (ref 1.1–3.7)
ABSOLUTE MONO #: 0.93 K/UL (ref 0.1–1.2)
ANION GAP SERPL CALCULATED.3IONS-SCNC: 9 MMOL/L (ref 9–17)
BASOPHILS # BLD: 0 % (ref 0–2)
BASOPHILS ABSOLUTE: <0.03 K/UL (ref 0–0.2)
BNP SERPL-MCNC: 193 PG/ML
BUN SERPL-MCNC: 25 MG/DL (ref 8–23)
BUN/CREAT BLD: 19 (ref 9–20)
CALCIUM SERPL-MCNC: 10.2 MG/DL (ref 8.6–10.4)
CHLORIDE SERPL-SCNC: 102 MMOL/L (ref 98–107)
CO2 SERPL-SCNC: 27 MMOL/L (ref 20–31)
CREAT SERPL-MCNC: 1.34 MG/DL (ref 0.5–0.9)
D DIMER BLD IA.RAPID-MCNC: 0.39 MG/L FEU (ref 0–0.59)
EKG ATRIAL RATE: 59 BPM
EKG ATRIAL RATE: 68 BPM
EKG P AXIS: 53 DEGREES
EKG P AXIS: 77 DEGREES
EKG P-R INTERVAL: 276 MS
EKG P-R INTERVAL: 286 MS
EKG Q-T INTERVAL: 416 MS
EKG Q-T INTERVAL: 440 MS
EKG QRS DURATION: 96 MS
EKG QRS DURATION: 96 MS
EKG QTC CALCULATION (BAZETT): 435 MS
EKG QTC CALCULATION (BAZETT): 442 MS
EKG R AXIS: -21 DEGREES
EKG R AXIS: 6 DEGREES
EKG T AXIS: 30 DEGREES
EKG T AXIS: 74 DEGREES
EKG VENTRICULAR RATE: 59 BPM
EKG VENTRICULAR RATE: 68 BPM
EOSINOPHILS RELATIVE PERCENT: 3 % (ref 1–4)
GFR SERPL CREATININE-BSD FRML MDRD: 40 ML/MIN/1.73M2
GLUCOSE SERPL-MCNC: 105 MG/DL (ref 70–99)
HCT VFR BLD AUTO: 30 % (ref 36.3–47.1)
HGB BLD-MCNC: 8.9 G/DL (ref 11.9–15.1)
IMMATURE GRANULOCYTES: 1 %
INR PPP: 2.1
LYMPHOCYTES # BLD: 20 % (ref 24–43)
MAGNESIUM SERPL-MCNC: 1.4 MG/DL (ref 1.6–2.6)
MCH RBC QN AUTO: 23.4 PG (ref 25.2–33.5)
MCHC RBC AUTO-ENTMCNC: 29.7 G/DL (ref 28.4–34.8)
MCV RBC AUTO: 78.7 FL (ref 82.6–102.9)
MONOCYTES # BLD: 11 % (ref 3–12)
NRBC AUTOMATED: 0 PER 100 WBC
PARTIAL THROMBOPLASTIN TIME: 31.3 SEC (ref 26.8–34.8)
PDW BLD-RTO: 16.4 % (ref 11.8–14.4)
PLATELET # BLD AUTO: 342 K/UL (ref 138–453)
PMV BLD AUTO: 9.3 FL (ref 8.1–13.5)
POTASSIUM SERPL-SCNC: 3.3 MMOL/L (ref 3.7–5.3)
PROTHROMBIN TIME: 22.9 SEC (ref 11.5–14.2)
RBC # BLD: 3.81 M/UL (ref 3.95–5.11)
SEG NEUTROPHILS: 65 % (ref 36–65)
SEGMENTED NEUTROPHILS ABSOLUTE COUNT: 5.39 K/UL (ref 1.5–8.1)
SODIUM SERPL-SCNC: 138 MMOL/L (ref 135–144)
TROPONIN I SERPL DL<=0.01 NG/ML-MCNC: 17 NG/L (ref 0–14)
TROPONIN I SERPL DL<=0.01 NG/ML-MCNC: 17 NG/L (ref 0–14)
WBC # BLD AUTO: 8.3 K/UL (ref 3.5–11.3)

## 2023-02-16 PROCEDURE — 85610 PROTHROMBIN TIME: CPT

## 2023-02-16 PROCEDURE — 96361 HYDRATE IV INFUSION ADD-ON: CPT

## 2023-02-16 PROCEDURE — 96365 THER/PROPH/DIAG IV INF INIT: CPT

## 2023-02-16 PROCEDURE — 71045 X-RAY EXAM CHEST 1 VIEW: CPT

## 2023-02-16 PROCEDURE — 6370000000 HC RX 637 (ALT 250 FOR IP): Performed by: STUDENT IN AN ORGANIZED HEALTH CARE EDUCATION/TRAINING PROGRAM

## 2023-02-16 PROCEDURE — 99285 EMERGENCY DEPT VISIT HI MDM: CPT

## 2023-02-16 PROCEDURE — 6360000002 HC RX W HCPCS: Performed by: STUDENT IN AN ORGANIZED HEALTH CARE EDUCATION/TRAINING PROGRAM

## 2023-02-16 PROCEDURE — 2580000003 HC RX 258: Performed by: STUDENT IN AN ORGANIZED HEALTH CARE EDUCATION/TRAINING PROGRAM

## 2023-02-16 PROCEDURE — 83880 ASSAY OF NATRIURETIC PEPTIDE: CPT

## 2023-02-16 PROCEDURE — 96366 THER/PROPH/DIAG IV INF ADDON: CPT

## 2023-02-16 PROCEDURE — 84484 ASSAY OF TROPONIN QUANT: CPT

## 2023-02-16 PROCEDURE — 2500000003 HC RX 250 WO HCPCS

## 2023-02-16 PROCEDURE — 85379 FIBRIN DEGRADATION QUANT: CPT

## 2023-02-16 PROCEDURE — G0378 HOSPITAL OBSERVATION PER HR: HCPCS

## 2023-02-16 PROCEDURE — 94761 N-INVAS EAR/PLS OXIMETRY MLT: CPT

## 2023-02-16 PROCEDURE — 2580000003 HC RX 258: Performed by: INTERNAL MEDICINE

## 2023-02-16 PROCEDURE — 85730 THROMBOPLASTIN TIME PARTIAL: CPT

## 2023-02-16 PROCEDURE — 6360000002 HC RX W HCPCS

## 2023-02-16 PROCEDURE — 36415 COLL VENOUS BLD VENIPUNCTURE: CPT

## 2023-02-16 PROCEDURE — 83735 ASSAY OF MAGNESIUM: CPT

## 2023-02-16 PROCEDURE — 85025 COMPLETE CBC W/AUTO DIFF WBC: CPT

## 2023-02-16 PROCEDURE — 80048 BASIC METABOLIC PNL TOTAL CA: CPT

## 2023-02-16 RX ORDER — POLYETHYLENE GLYCOL 3350 17 G/17G
17 POWDER, FOR SOLUTION ORAL DAILY PRN
Status: DISCONTINUED | OUTPATIENT
Start: 2023-02-16 | End: 2023-02-17 | Stop reason: HOSPADM

## 2023-02-16 RX ORDER — ACETAMINOPHEN 325 MG/1
650 TABLET ORAL EVERY 6 HOURS PRN
Status: DISCONTINUED | OUTPATIENT
Start: 2023-02-16 | End: 2023-02-17 | Stop reason: HOSPADM

## 2023-02-16 RX ORDER — CHLORTHALIDONE 25 MG/1
25 TABLET ORAL DAILY
Status: DISCONTINUED | OUTPATIENT
Start: 2023-02-17 | End: 2023-02-17 | Stop reason: HOSPADM

## 2023-02-16 RX ORDER — ACETAMINOPHEN 650 MG/1
650 SUPPOSITORY RECTAL EVERY 6 HOURS PRN
Status: DISCONTINUED | OUTPATIENT
Start: 2023-02-16 | End: 2023-02-17 | Stop reason: HOSPADM

## 2023-02-16 RX ORDER — MAGNESIUM SULFATE IN WATER 40 MG/ML
4000 INJECTION, SOLUTION INTRAVENOUS ONCE
Status: COMPLETED | OUTPATIENT
Start: 2023-02-16 | End: 2023-02-17

## 2023-02-16 RX ORDER — NITROGLYCERIN 0.4 MG/1
0.4 TABLET SUBLINGUAL EVERY 5 MIN PRN
Status: DISCONTINUED | OUTPATIENT
Start: 2023-02-16 | End: 2023-02-17 | Stop reason: HOSPADM

## 2023-02-16 RX ORDER — 0.9 % SODIUM CHLORIDE 0.9 %
1000 INTRAVENOUS SOLUTION INTRAVENOUS ONCE
Status: COMPLETED | OUTPATIENT
Start: 2023-02-16 | End: 2023-02-16

## 2023-02-16 RX ORDER — OXYBUTYNIN CHLORIDE 5 MG/1
15 TABLET, EXTENDED RELEASE ORAL DAILY
Status: DISCONTINUED | OUTPATIENT
Start: 2023-02-17 | End: 2023-02-17 | Stop reason: HOSPADM

## 2023-02-16 RX ORDER — SODIUM CHLORIDE 0.9 % (FLUSH) 0.9 %
5-40 SYRINGE (ML) INJECTION EVERY 12 HOURS SCHEDULED
Status: DISCONTINUED | OUTPATIENT
Start: 2023-02-16 | End: 2023-02-17 | Stop reason: HOSPADM

## 2023-02-16 RX ORDER — PANTOPRAZOLE SODIUM 40 MG/1
40 TABLET, DELAYED RELEASE ORAL
Status: DISCONTINUED | OUTPATIENT
Start: 2023-02-17 | End: 2023-02-17 | Stop reason: HOSPADM

## 2023-02-16 RX ORDER — ASPIRIN 81 MG/1
324 TABLET, CHEWABLE ORAL ONCE
Status: DISCONTINUED | OUTPATIENT
Start: 2023-02-16 | End: 2023-02-17 | Stop reason: HOSPADM

## 2023-02-16 RX ORDER — AMOXICILLIN 500 MG
1200 CAPSULE ORAL
Status: DISCONTINUED | OUTPATIENT
Start: 2023-02-17 | End: 2023-02-17 | Stop reason: HOSPADM

## 2023-02-16 RX ORDER — POTASSIUM CHLORIDE 7.45 MG/ML
10 INJECTION INTRAVENOUS ONCE
Status: COMPLETED | OUTPATIENT
Start: 2023-02-16 | End: 2023-02-16

## 2023-02-16 RX ORDER — ATENOLOL AND CHLORTHALIDONE TABLET 100; 25 MG/1; MG/1
1 TABLET ORAL DAILY
Status: DISCONTINUED | OUTPATIENT
Start: 2023-02-17 | End: 2023-02-16

## 2023-02-16 RX ORDER — ATENOLOL 50 MG/1
100 TABLET ORAL DAILY
Status: DISCONTINUED | OUTPATIENT
Start: 2023-02-17 | End: 2023-02-17 | Stop reason: HOSPADM

## 2023-02-16 RX ORDER — SODIUM CHLORIDE 0.9 % (FLUSH) 0.9 %
10 SYRINGE (ML) INJECTION PRN
Status: DISCONTINUED | OUTPATIENT
Start: 2023-02-16 | End: 2023-02-17 | Stop reason: HOSPADM

## 2023-02-16 RX ORDER — SERTRALINE HYDROCHLORIDE 25 MG/1
50 TABLET, FILM COATED ORAL DAILY
Status: DISCONTINUED | OUTPATIENT
Start: 2023-02-17 | End: 2023-02-17 | Stop reason: HOSPADM

## 2023-02-16 RX ORDER — POTASSIUM CHLORIDE 20 MEQ/1
40 TABLET, EXTENDED RELEASE ORAL ONCE
Status: COMPLETED | OUTPATIENT
Start: 2023-02-16 | End: 2023-02-16

## 2023-02-16 RX ORDER — ONDANSETRON 4 MG/1
4 TABLET, ORALLY DISINTEGRATING ORAL EVERY 8 HOURS PRN
Status: DISCONTINUED | OUTPATIENT
Start: 2023-02-16 | End: 2023-02-17 | Stop reason: HOSPADM

## 2023-02-16 RX ORDER — PHENOL 1.4 %
1 AEROSOL, SPRAY (ML) MUCOUS MEMBRANE 2 TIMES DAILY
Status: DISCONTINUED | OUTPATIENT
Start: 2023-02-17 | End: 2023-02-17 | Stop reason: HOSPADM

## 2023-02-16 RX ORDER — ONDANSETRON 2 MG/ML
4 INJECTION INTRAMUSCULAR; INTRAVENOUS EVERY 6 HOURS PRN
Status: DISCONTINUED | OUTPATIENT
Start: 2023-02-16 | End: 2023-02-17 | Stop reason: HOSPADM

## 2023-02-16 RX ORDER — SODIUM CHLORIDE 9 MG/ML
INJECTION, SOLUTION INTRAVENOUS CONTINUOUS
Status: DISCONTINUED | OUTPATIENT
Start: 2023-02-16 | End: 2023-02-17 | Stop reason: HOSPADM

## 2023-02-16 RX ADMIN — POTASSIUM CHLORIDE 40 MEQ: 1500 TABLET, EXTENDED RELEASE ORAL at 20:38

## 2023-02-16 RX ADMIN — SODIUM CHLORIDE 1000 ML: 9 INJECTION, SOLUTION INTRAVENOUS at 20:37

## 2023-02-16 RX ADMIN — MAGNESIUM SULFATE HEPTAHYDRATE 4000 MG: 40 INJECTION, SOLUTION INTRAVENOUS at 21:50

## 2023-02-16 RX ADMIN — NITROGLYCERIN 0.4 MG: 0.4 TABLET SUBLINGUAL at 21:47

## 2023-02-16 RX ADMIN — POTASSIUM CHLORIDE 10 MEQ: 7.46 INJECTION, SOLUTION INTRAVENOUS at 20:37

## 2023-02-16 RX ADMIN — NITROGLYCERIN 1 INCH: 20 OINTMENT TOPICAL at 22:23

## 2023-02-16 RX ADMIN — SODIUM CHLORIDE: 9 INJECTION, SOLUTION INTRAVENOUS at 23:36

## 2023-02-16 ASSESSMENT — PAIN - FUNCTIONAL ASSESSMENT: PAIN_FUNCTIONAL_ASSESSMENT: 0-10

## 2023-02-16 ASSESSMENT — ENCOUNTER SYMPTOMS
EYE PAIN: 0
NAUSEA: 0
ABDOMINAL PAIN: 0
RHINORRHEA: 0
VOMITING: 0
SHORTNESS OF BREATH: 0

## 2023-02-16 ASSESSMENT — PAIN DESCRIPTION - DESCRIPTORS: DESCRIPTORS: TIGHTNESS

## 2023-02-16 ASSESSMENT — PAIN SCALES - GENERAL: PAINLEVEL_OUTOF10: 7

## 2023-02-16 ASSESSMENT — PAIN DESCRIPTION - LOCATION: LOCATION: CHEST

## 2023-02-16 NOTE — TELEPHONE ENCOUNTER
Received letter from Dr Amber Chen to hold Xarelto for 3 days prior procedure called patient left message to hold medication/ call office if any questions

## 2023-02-17 ENCOUNTER — APPOINTMENT (OUTPATIENT)
Dept: NON INVASIVE DIAGNOSTICS | Age: 78
End: 2023-02-17
Payer: MEDICARE

## 2023-02-17 ENCOUNTER — APPOINTMENT (OUTPATIENT)
Dept: CARDIAC CATH/INVASIVE PROCEDURES | Age: 78
End: 2023-02-17
Payer: MEDICARE

## 2023-02-17 VITALS
TEMPERATURE: 98.2 F | HEIGHT: 64 IN | RESPIRATION RATE: 16 BRPM | BODY MASS INDEX: 36.4 KG/M2 | HEART RATE: 68 BPM | SYSTOLIC BLOOD PRESSURE: 124 MMHG | WEIGHT: 213.19 LBS | DIASTOLIC BLOOD PRESSURE: 57 MMHG | OXYGEN SATURATION: 94 %

## 2023-02-17 PROBLEM — R77.8 ELEVATED TROPONIN: Status: ACTIVE | Noted: 2023-02-17

## 2023-02-17 PROBLEM — R79.89 ELEVATED TROPONIN: Status: ACTIVE | Noted: 2023-02-17

## 2023-02-17 LAB
ABSOLUTE EOS #: 0.25 K/UL (ref 0–0.44)
ABSOLUTE IMMATURE GRANULOCYTE: 0.03 K/UL (ref 0–0.3)
ABSOLUTE LYMPH #: 1.24 K/UL (ref 1.1–3.7)
ABSOLUTE MONO #: 0.81 K/UL (ref 0.1–1.2)
ALBUMIN SERPL-MCNC: 3.7 G/DL (ref 3.5–5.2)
ALBUMIN/GLOBULIN RATIO: 1.4 (ref 1–2.5)
ALP SERPL-CCNC: 53 U/L (ref 35–104)
ALT SERPL-CCNC: 10 U/L (ref 5–33)
ANION GAP SERPL CALCULATED.3IONS-SCNC: 6 MMOL/L (ref 9–17)
AST SERPL-CCNC: 16 U/L
BASOPHILS # BLD: 0 % (ref 0–2)
BASOPHILS ABSOLUTE: <0.03 K/UL (ref 0–0.2)
BILIRUB SERPL-MCNC: 0.2 MG/DL (ref 0.3–1.2)
BUN SERPL-MCNC: 21 MG/DL (ref 8–23)
BUN/CREAT BLD: 18 (ref 9–20)
CALCIUM SERPL-MCNC: 9.8 MG/DL (ref 8.6–10.4)
CHLORIDE SERPL-SCNC: 108 MMOL/L (ref 98–107)
CHOLEST SERPL-MCNC: 159 MG/DL
CHOLESTEROL/HDL RATIO: 4.4
CO2 SERPL-SCNC: 28 MMOL/L (ref 20–31)
CREAT SERPL-MCNC: 1.2 MG/DL (ref 0.5–0.9)
EKG ATRIAL RATE: 57 BPM
EKG P AXIS: 75 DEGREES
EKG P-R INTERVAL: 284 MS
EKG Q-T INTERVAL: 436 MS
EKG QRS DURATION: 96 MS
EKG QTC CALCULATION (BAZETT): 424 MS
EKG R AXIS: -8 DEGREES
EKG T AXIS: 44 DEGREES
EKG VENTRICULAR RATE: 57 BPM
EOSINOPHILS RELATIVE PERCENT: 4 % (ref 1–4)
GFR SERPL CREATININE-BSD FRML MDRD: 46 ML/MIN/1.73M2
GLUCOSE SERPL-MCNC: 101 MG/DL (ref 70–99)
HCT VFR BLD AUTO: 26.9 % (ref 36.3–47.1)
HDLC SERPL-MCNC: 36 MG/DL
HGB BLD-MCNC: 7.8 G/DL (ref 11.9–15.1)
IMMATURE GRANULOCYTES: 0 %
LDLC SERPL CALC-MCNC: 103 MG/DL (ref 0–130)
LV EF: 65 %
LVEF MODALITY: NORMAL
LYMPHOCYTES # BLD: 17 % (ref 24–43)
MCH RBC QN AUTO: 22.6 PG (ref 25.2–33.5)
MCHC RBC AUTO-ENTMCNC: 29 G/DL (ref 28.4–34.8)
MCV RBC AUTO: 78 FL (ref 82.6–102.9)
MONOCYTES # BLD: 11 % (ref 3–12)
NRBC AUTOMATED: 0 PER 100 WBC
PDW BLD-RTO: 16.3 % (ref 11.8–14.4)
PLATELET # BLD AUTO: 297 K/UL (ref 138–453)
PMV BLD AUTO: 9.4 FL (ref 8.1–13.5)
POTASSIUM SERPL-SCNC: 4.1 MMOL/L (ref 3.7–5.3)
PROT SERPL-MCNC: 6.3 G/DL (ref 6.4–8.3)
RBC # BLD: 3.45 M/UL (ref 3.95–5.11)
SEG NEUTROPHILS: 68 % (ref 36–65)
SEGMENTED NEUTROPHILS ABSOLUTE COUNT: 4.78 K/UL (ref 1.5–8.1)
SODIUM SERPL-SCNC: 142 MMOL/L (ref 135–144)
TRIGL SERPL-MCNC: 101 MG/DL
TROPONIN I SERPL DL<=0.01 NG/ML-MCNC: 22 NG/L (ref 0–14)
WBC # BLD AUTO: 7.1 K/UL (ref 3.5–11.3)

## 2023-02-17 PROCEDURE — 93005 ELECTROCARDIOGRAM TRACING: CPT | Performed by: INTERNAL MEDICINE

## 2023-02-17 PROCEDURE — 84484 ASSAY OF TROPONIN QUANT: CPT

## 2023-02-17 PROCEDURE — G0378 HOSPITAL OBSERVATION PER HR: HCPCS

## 2023-02-17 PROCEDURE — 80053 COMPREHEN METABOLIC PANEL: CPT

## 2023-02-17 PROCEDURE — 96361 HYDRATE IV INFUSION ADD-ON: CPT

## 2023-02-17 PROCEDURE — 96366 THER/PROPH/DIAG IV INF ADDON: CPT

## 2023-02-17 PROCEDURE — C1894 INTRO/SHEATH, NON-LASER: HCPCS

## 2023-02-17 PROCEDURE — 99152 MOD SED SAME PHYS/QHP 5/>YRS: CPT

## 2023-02-17 PROCEDURE — 99233 SBSQ HOSP IP/OBS HIGH 50: CPT | Performed by: INTERNAL MEDICINE

## 2023-02-17 PROCEDURE — C1769 GUIDE WIRE: HCPCS

## 2023-02-17 PROCEDURE — 80061 LIPID PANEL: CPT

## 2023-02-17 PROCEDURE — 85025 COMPLETE CBC W/AUTO DIFF WBC: CPT

## 2023-02-17 PROCEDURE — 6370000000 HC RX 637 (ALT 250 FOR IP): Performed by: INTERNAL MEDICINE

## 2023-02-17 PROCEDURE — 2580000003 HC RX 258: Performed by: INTERNAL MEDICINE

## 2023-02-17 PROCEDURE — 2709999900 HC NON-CHARGEABLE SUPPLY

## 2023-02-17 PROCEDURE — 93306 TTE W/DOPPLER COMPLETE: CPT

## 2023-02-17 PROCEDURE — 94761 N-INVAS EAR/PLS OXIMETRY MLT: CPT

## 2023-02-17 PROCEDURE — 93458 L HRT ARTERY/VENTRICLE ANGIO: CPT

## 2023-02-17 PROCEDURE — 36415 COLL VENOUS BLD VENIPUNCTURE: CPT

## 2023-02-17 PROCEDURE — 6360000004 HC RX CONTRAST MEDICATION: Performed by: INTERNAL MEDICINE

## 2023-02-17 PROCEDURE — 93458 L HRT ARTERY/VENTRICLE ANGIO: CPT | Performed by: FAMILY MEDICINE

## 2023-02-17 RX ORDER — SODIUM CHLORIDE 9 MG/ML
INJECTION, SOLUTION INTRAVENOUS PRN
OUTPATIENT
Start: 2023-02-17

## 2023-02-17 RX ORDER — ACETAMINOPHEN 325 MG/1
650 TABLET ORAL EVERY 4 HOURS PRN
OUTPATIENT
Start: 2023-02-17

## 2023-02-17 RX ORDER — NITROGLYCERIN 0.4 MG/1
0.4 TABLET SUBLINGUAL EVERY 5 MIN PRN
OUTPATIENT
Start: 2023-02-17

## 2023-02-17 RX ORDER — SODIUM CHLORIDE 0.9 % (FLUSH) 0.9 %
5-40 SYRINGE (ML) INJECTION EVERY 12 HOURS SCHEDULED
OUTPATIENT
Start: 2023-02-17

## 2023-02-17 RX ORDER — SODIUM CHLORIDE 9 MG/ML
INJECTION, SOLUTION INTRAVENOUS CONTINUOUS
OUTPATIENT
Start: 2023-02-17

## 2023-02-17 RX ORDER — SODIUM CHLORIDE 0.9 % (FLUSH) 0.9 %
5-40 SYRINGE (ML) INJECTION PRN
OUTPATIENT
Start: 2023-02-17

## 2023-02-17 RX ORDER — DIPHENHYDRAMINE HCL 50 MG
50 CAPSULE ORAL ONCE
OUTPATIENT
Start: 2023-02-17 | End: 2023-02-17

## 2023-02-17 RX ADMIN — ATENOLOL 100 MG: 50 TABLET ORAL at 08:58

## 2023-02-17 RX ADMIN — SERTRALINE HYDROCHLORIDE 50 MG: 25 TABLET ORAL at 08:58

## 2023-02-17 RX ADMIN — SODIUM CHLORIDE, PRESERVATIVE FREE 10 ML: 5 INJECTION INTRAVENOUS at 08:58

## 2023-02-17 RX ADMIN — CHLORTHALIDONE 25 MG: 25 TABLET ORAL at 08:58

## 2023-02-17 RX ADMIN — IOPAMIDOL 50 ML: 755 INJECTION, SOLUTION INTRAVENOUS at 10:51

## 2023-02-17 RX ADMIN — PANTOPRAZOLE SODIUM 40 MG: 40 TABLET, DELAYED RELEASE ORAL at 08:58

## 2023-02-17 RX ADMIN — OXYBUTYNIN CHLORIDE 15 MG: 5 TABLET, EXTENDED RELEASE ORAL at 08:57

## 2023-02-17 RX ADMIN — ACETAMINOPHEN 650 MG: 325 TABLET ORAL at 01:45

## 2023-02-17 NOTE — RT PROTOCOL NOTE
Writer entered pt's room to place pt on bipap per  order. After speaking with pt, there seemed to be some uncertainty of whether she wore bipap or cpap at home and what her current settings were. Patient also stated she was diagnosed with bilateral pulmonary blood clots in December of 2022. Pt was not put on bipap due to recent findings. Pt's SpO2 on RA is 95% at this time. Will continue to monitor.

## 2023-02-17 NOTE — PROGRESS NOTES
Nutrition Assessment     Type and Reason for Visit: Initial    Nutrition Recommendations/Plan:   Continue NPO diet. Progress to 2 gm sodium, low fat/low cholesterol, high fiber diet when medically appropriate. Malnutrition Assessment:  Malnutrition Status: At risk for malnutrition (Comment)    Nutrition Assessment:  Inadequate oral intakes r/t cardiac dysfunction aeb NPO. Pt admitted with chest pain. Hx CKD II and GERD. Pt states she was NPO for testing for 2 days. Declined offer of heart healthy diet education needs. Pt being discharged and is ready to leave. Nutrition Related Findings:   appears well nourished Wound Type: None    Current Nutrition Therapies:    Diet NPO    Anthropometric Measures:  Height: 5' 4\" (162.6 cm)  Current Body Wt: 213 lb 3 oz (96.7 kg)   BMI: 36.6    Nutrition Diagnosis:   Inadequate oral intake related to cardiac dysfunction as evidenced by NPO or clear liquid status due to medical condition    Nutrition Interventions:   Food and/or Nutrient Delivery: Continue NPO  Nutrition Education/Counseling: Education declined  Coordination of Nutrition Care: Continue to monitor while inpatient  Plan of Care discussed with: Patient    Goals:     Goals: Initiate PO diet     Recent Labs     02/16/23 1945 02/17/23  0535    142   K 3.3* 4.1    108*   CO2 27 28   BUN 25* 21   CREATININE 1.34* 1.20*   GLUCOSE 105* 101*   ALT  --  10   ALKPHOS  --  53      Lab Results   Component Value Date/Time    LABALBU 3.7 02/17/2023 05:35 AM    LABALBU 4.3 12/05/2011 09:25 AM       Lab Results   Component Value Date/Time    TRIG 105 11/07/2016 09:59 AM    HDL 51 11/07/2016 09:59 AM      Nutrition Monitoring and Evaluation:   Behavioral-Environmental Outcomes: Readiness for Change  Food/Nutrient Intake Outcomes: Diet Advancement/Tolerance  Physical Signs/Symptoms Outcomes: Biochemical Data, Weight    Discharge Planning:     Too soon to determine     Jun Delong, [address], [place]  Contact: [phone]

## 2023-02-17 NOTE — PLAN OF CARE
Problem: Discharge Planning  Goal: Discharge to home or other facility with appropriate resources  2/17/2023 1301 by Aleisha Allen RN  Outcome: Completed  Flowsheets (Taken 2/17/2023 0607 by Luis Barros RN)  Discharge to home or other facility with appropriate resources: Identify barriers to discharge with patient and caregiver  2/17/2023 0152 by Maximus Holden RN  Outcome: Progressing  Flowsheets (Taken 2/17/2023 0152)  Discharge to home or other facility with appropriate resources:   Identify barriers to discharge with patient and caregiver   Identify discharge learning needs (meds, wound care, etc)     Problem: Safety - Adult  Goal: Free from fall injury  2/17/2023 1301 by Aleisha Allen RN  Outcome: Completed  2/17/2023 0152 by Maximus Holden RN  Outcome: Progressing  Flowsheets (Taken 2/17/2023 0152)  Free From Fall Injury: Instruct family/caregiver on patient safety

## 2023-02-17 NOTE — H&P
History and Physical    Patient:  Luzmaria Olguin  MRN: [id number]    Chief Complaint:  chest pain    History Obtained From:  patient, electronic medical record    PCP: Srini Anna DO    History of Present Illness: The patient is a 76 y.o. female who presented to the ER with complaints of chest pressure and \"indigestion\" most of the day. She has history of recent DVT/Bilateral PE in 12/2022 and is currently on Xarelto. She does have history of Brain Bleed in 1987 no further issues. She stated she had radiation to jaw. She is on Protonix. No recent illness. No fever chills. Denied SOB, dizziness or syncope. Past Medical History:        Diagnosis Date    Arthritis     Blood clot in vein 04/2022    on Xarelto for two months per patient    Brain bleed ([facility] [address] 1983    Cataract     DVT (deep venous thrombosis) ([facility] [address]     History of hip replacement     History of total bilateral knee replacement     Hypertension     Sleep apnea        Past Surgical History:        Procedure Laterality Date    COLONOSCOPY  08/13/2019    JOINT REPLACEMENT Bilateral     knees and hips       Medications Prior to Admission:    Prior to Admission medications    Medication Sig Start Date End Date Taking?  Authorizing Provider   albuterol sulfate (PROAIR RESPICLICK) 309 (90 Base) MCG/ACT aerosol powder inhalation Inhale into the lungs every 4 hours as needed for Wheezing or Shortness of Breath  Patient not taking: No sig reported    Historical Provider, MD   rivaroxaban (XARELTO) 20 MG TABS tablet Take 1 tablet by mouth Daily with supper 1/8/23   Maile Batista, APRN - CNP   Lifitegrast (XIIDRA OP) Apply 1 drop to eye in the morning and at bedtime    Historical Provider, MD   vitamin C (ASCORBIC ACID) 500 MG tablet Take 500 mg by mouth daily    Historical Provider, MD   Glycerin-Polysorbate 80 (REFRESH DRY EYE THERAPY OP) Apply 1 drop to eye as needed    Historical Provider, MD   oxybutynin (DITROPAN XL) 15 MG extended release tablet Take 1 tablet by mouth daily 2/1/22   Ema Bro, DO   Multiple Vitamins-Minerals (DRY EYE FORMULA PO)     Historical Provider, MD   omeprazole (PRILOSEC) 20 MG capsule Take 20 mg by mouth in the morning and at bedtime    Historical Provider, MD   atenolol-chlorthalidone (TENORETIC) 100-25 MG per tablet Take 1 tablet by mouth daily. Historical Provider, MD   sertraline (ZOLOFT) 50 MG tablet Take 50 mg by mouth daily. Historical Provider, MD   Omega-3 Fatty Acids (FISH OIL) 1200 MG CAPS Take 1 tablet by mouth. Historical Provider, MD   vitamin E 1000 UNITS capsule Take 1,000 Units by mouth daily. Patient not taking: No sig reported    Historical Provider, MD   calcium carbonate 600 MG TABS tablet Take 1 tablet by mouth 2 times daily. Historical Provider, MD       Allergies:  Sulfamethoxazole-trimethoprim, Aspirin, Iron, and Sulfa antibiotics    Social History:   TOBACCO:   reports that she has never smoked. She has never used smokeless tobacco.  ETOH:   reports no history of alcohol use. Family History:       Problem Relation Age of Onset    Deep Vein Thrombosis Mother     Colon Cancer Nephew        Allergies:  Sulfamethoxazole-trimethoprim, Aspirin, Iron, and Sulfa antibiotics    Medications Prior to Admission:    Prior to Admission medications    Medication Sig Start Date End Date Taking?  Authorizing Provider   albuterol sulfate (PROAIR RESPICLICK) 174 (90 Base) MCG/ACT aerosol powder inhalation Inhale into the lungs every 4 hours as needed for Wheezing or Shortness of Breath  Patient not taking: No sig reported    Historical Provider, MD   rivaroxaban (XARELTO) 20 MG TABS tablet Take 1 tablet by mouth Daily with supper 1/8/23   GRIS Rodgers - CNP   Lifitegrast (XIIDRA OP) Apply 1 drop to eye in the morning and at bedtime    Historical Provider, MD   vitamin C (ASCORBIC ACID) 500 MG tablet Take 500 mg by mouth daily    Historical Provider, MD Glycerin-Polysorbate 80 (REFRESH DRY EYE THERAPY OP) Apply 1 drop to eye as needed    Historical Provider, MD   oxybutynin (DITROPAN XL) 15 MG extended release tablet Take 1 tablet by mouth daily 2/1/22   Edmund Braxton, DO   Multiple Vitamins-Minerals (DRY EYE FORMULA PO)     Historical Provider, MD   omeprazole (PRILOSEC) 20 MG capsule Take 20 mg by mouth in the morning and at bedtime    Historical Provider, MD   atenolol-chlorthalidone (TENORETIC) 100-25 MG per tablet Take 1 tablet by mouth daily. Historical Provider, MD   sertraline (ZOLOFT) 50 MG tablet Take 50 mg by mouth daily. Historical Provider, MD   Omega-3 Fatty Acids (FISH OIL) 1200 MG CAPS Take 1 tablet by mouth. Historical Provider, MD   vitamin E 1000 UNITS capsule Take 1,000 Units by mouth daily. Patient not taking: No sig reported    Historical Provider, MD   calcium carbonate 600 MG TABS tablet Take 1 tablet by mouth 2 times daily. Historical Provider, MD       Review of Systems:  Constitutional:negative  for fevers, and negative for chills. Eyes: negative for visual disturbance   ENT: negative for sore throat, negative nasal congestion, and negative for earache  Respiratory: negative for shortness of breath, negative for cough, and negative for wheezing  Cardiovascular: positive for chest pain, negative for palpitations, and negative for syncope  Gastrointestinal: negative for abdominal pain, negative for nausea,negative for vomiting, negative for diarrhea, negative for constipation, and negative for hematochezia or melena  Genitourinary: negative for dysuria, negative for urinary urgency, negative for urinary frequency, and negative for hematuria  Skin: negative for skin rash, and negative for skin lesions  Neurological: negative for unilateral weakness, numbness or tingling.     Physical Exam:    Vitals:   Temp: 97.7 °F (36.5 °C)  BP: 134/88  Resp: 16  Heart Rate: 63  SpO2: 93 %  24HR INTAKE/OUTPUT:    Intake/Output Summary (Last 24 hours) at 2/17/2023 0724  Last data filed at 2/17/2023 0446  Gross per 24 hour   Intake 1727.54 ml   Output 300 ml   Net 1427.54 ml       Weight    Body mass index is 36.59 kg/m². Exam:  GEN:    Awake, alert and oriented x3. EYES:  EOMI, pupils equal   NECK: Supple. No lymphadenopathy. No carotid bruit  CVS:    regular rate and rhythm, no audible murmur  PULM:  CTA, no wheezes, rales or rhonchi, no acute respiratory distress  ABD:    Bowels sounds normal.  Abdomen is soft. No distention. no tenderness to palpation. EXT:   no edema bilaterally . No calf tenderness. NEURO: Moves all extremities. Motor and sensory are grossly intact  SKIN:  No rashes.   No skin lesions.    -----------------------------------------------------------------  Diagnostic Data:     DATA:    CBC:   Lab Results   Component Value Date    WBC 7.1 02/17/2023    RBC 3.45 (L) 02/17/2023    HGB 7.8 (L) 02/17/2023    HCT 26.9 (L) 02/17/2023    MCV 78.0 (L) 02/17/2023     02/17/2023        CMP:   Lab Results   Component Value Date    GLUCOSE 101 (H) 02/17/2023    BUN 21 02/17/2023    CREATININE 1.20 (H) 02/17/2023     02/17/2023    K 4.1 02/17/2023    CALCIUM 9.8 02/17/2023     (H) 02/17/2023    CO2 28 02/17/2023    PROT 6.3 (L) 02/17/2023    LABALBU 3.7 02/17/2023    BILITOT 0.2 (L) 02/17/2023    ALKPHOS 53 02/17/2023    ALT 10 02/17/2023    AST 16 02/17/2023       UA:   Lab Results   Component Value Date    COLORU YELLOW 06/09/2021    SPECGRAV 1.010 06/09/2021    WBCUA None 06/09/2021    RBCUA None 06/09/2021    EPITHUA 0 TO 2 06/09/2021    LEUKOCYTESUR NEGATIVE 06/09/2021    GLUCOSEU NEGATIVE 06/09/2021    KETUA NEGATIVE 06/09/2021    PROTEINU NEGATIVE 06/09/2021    HGBUR NEGATIVE 06/09/2021    CASTUA NOT REPORTED 06/09/2021    CRYSTUA NOT REPORTED 06/09/2021    BACTERIA NOT REPORTED 06/09/2021    YEAST NOT REPORTED 06/09/2021       Lactic Acid:   No results found for: LACTA    D-Dimer:  Lab Results Component Value Date    DDIMER 0.39 02/16/2023       PT/INR:  Lab Results   Component Value Date/Time    PROTIME 22.9 02/16/2023 07:45 PM    INR 2.1 02/16/2023 07:45 PM       High Sensitivity Troponin:  Recent Labs     02/16/23  1945 02/16/23 2025 02/17/23  0535   TROPHS 17* 17* 22*       ABGs:   No results found for: PHART, PH, ZMY3XNK, PCO2, PO2ART, PO2, DXH2NCU, HCO3, BEART, BE, THGBART, THB, ILF0GJI, Q9HAOGPK, O2SAT, FIO2        XR CHEST PORTABLE   Final Result   No acute findings. Elevation of the right hemidiaphragm similar to the prior study. Assessment:    Principal Problem:    Chest pain  Active Problems:    Essential hypertension    Chronic kidney disease, stage 2 (mild)    Iron deficiency anemia due to chronic blood loss  Resolved Problems:    * No resolved hospital problems.  *      Patient Active Problem List    Diagnosis Date Noted    Chest pain 02/16/2023    Drug intolerance 02/14/2023    Iron deficiency anemia due to chronic blood loss 02/13/2023    Chronic kidney disease, stage 2 (mild) 12/18/2022    Bilateral pulmonary embolism ([address] 12/17/2022    Essential hypertension 12/17/2022    History of right hip replacement 01/11/2022    Osteoarthritis of right glenohumeral joint 01/11/2022       Plan:     MEDICAL DECISION MAKING:    Primary Problem(s): Chest pain  Differential diagnoses: GERD, CAD, Unstable Angina  Condition is an undiagnosed new problem with uncertain prognosis  Condition is stable  Treatment plan: Consultation: Dr. Pierson Dear  Imaging:  Cardiac Cath today  Medications:   Continue ASA  Medication Monitoring / High Risk Medications: none      HTN    Condition is a chronic stable condition  Treatment plan: Continue current treatment  Imaging: no further imaging studies ordered today  Medications:   Cotninue Tenormin/Hygroton      Bilateral PE/DVT    Condition is a chronic stable condition  Treatment plan: Continue current treatment  Imaging: no further imaging studies ordered today  Medications:   Continue Xarelto    Nutrition status: Well developed, well nourished with no malnutrition  Dietician consult initiated    Hospital Prophylaxis:   DVT: Xarelto   Stress Ulcer: PPI     MDM Data:   Test interpretation:  My independent EKG interpretation: sinus bradycardia, 1st degree AV block  My independent X-ray interpretation:  CXR shows no acute process  Management and/or test interpretation discussed with ER MD at time of admission  Consults and Nursing notes were personally reviewed, all current labs and imaging were personally reviewed, tests ordered: CBC, BMP, and history obtained by independent historian       Disposition:  Shared decision making: All test results, treatment options and disposition options were discussed with the patient today  Social determinants of health that may impact management: none  Code status: Full Code   Disposition: Discharge plan is pending        Critical Care Time:  Total critical care time caring for this patient with life threatening, unstable organ failure, including direct patient contact, management of life support systems, review of data including imaging and labs, discussions with other team members and physicians at least 0 minutes so far today, excluding separately billable procedures. [facility] Medication Reconciliation documentation:    [x] I have utilized all available immediate resources to obtain, update, or review the patient's current medications (including all prescriptions, over-the-counter products, herbals, cannabinoid products and bitamin/mineral/dietary/nutritional supplements. Allegra.Satish 'yes\", Bernie Mercer     []  The patient is not eligible for medication reconciliation; the patient is in an emergent medical situation where delaying treatment would jeopardize the patient's health    []  I did NOT confirm, update or review the patient's current list of medications today.   [DOES NOT SATISFY [facility] PERFORMANCE]        [facility] Advanced Care Planning documentation:  [x] I have confirmed that the patient's Advance Care Plan is present, Code Status is documented, or surrogate decision maker is listed in the patient's medical record  [If \"yes\", STOP HERE]     [] The patient's Advance Care Plan is NOT present because:    []  I confirmed today that the patient does not wish or was not able to name a   surrogate decision maker or provide and advance care plan.    [] Hospice care is currently being provided or has been provided within the   calendar year. []  I did NOT confirm today the presence of an [address] or surrogate   decision maker documented within the patient's medical record. [DOES NOT SATISFY [employer] PERFORMANCE]    CORE MEASURES  DVT prophylaxis: already on chronic anticoagulation  Decubitus ulcer present on admission: No  CODE STATUS: FULL CODE  Nutrition Status: good   Physical therapy: No   Old Charts reviewed: Yes  EKG Reviewed:  Yes  Advance Directive Addressed: Yes    GRIS Garcia - CNP, GRIS, NP-C  2/17/2023, 7:24 AM

## 2023-02-17 NOTE — CARE COORDINATION
Case Management Assessment  Initial Evaluation    Date/Time of Evaluation: 2/17/2023 9:18 AM  Assessment Completed by: MEJIA Irving    If patient is discharged prior to next notation, then this note serves as note for discharge by case management. Patient Name: Mariana Fatima                   YOB: 1945  Diagnosis: Elevated troponin [R77.8]  Chest pain, unspecified type [R07.9]  Chest pain [R07.9]                   Date / Time: 2/16/2023  7:11 PM    Patient Admission Status: Observation   Readmission Risk (Low < 19, Mod (19-27), High > 27): Readmission Risk Score: 12    Current PCP: Michael Grewal, DO  PCP verified by CM? Yes    Chart Reviewed: Yes      History Provided by: Patient  Patient Orientation: Alert and Oriented, Person, Place, Situation, Self    Patient Cognition: Alert    Hospitalization in the last 30 days (Readmission):  No    If yes, Readmission Assessment in CM Navigator will be completed. Advance Directives:      Code Status: Full Code   Patient's Primary Decision Maker is: Legal Next of Kin    Primary Decision MakerMthanh Soliz - [phone]    Discharge Planning:    Patient lives with: Alone Type of Home: House  Primary Care Giver: Self  Patient Support Systems include: Family Members   Current Financial resources: Medicare  Current community resources: None  Current services prior to admission: Durable Medical Equipment            Current DME: Bipap, Cane            Type of Home Care services:  None    ADLS  Prior functional level: Independent in ADLs/IADLs  Current functional level: Independent in ADLs/IADLs    PT AM-PAC:   /24  OT AM-PAC:   /24    Family can provide assistance at DC: Would you like Case Management to discuss the discharge plan with any other family members/significant others, and if so, who?  No  Plans to Return to Present Housing: Yes  Other Identified Issues/Barriers to RETURNING to current housing: alone  Potential Assistance needed at discharge: Durable Medical Equipment            Potential DME: Reesebranden Rivera  Patient expects to discharge to: House  Plan for transportation at discharge: Family    Financial    Payor: Orestes Lopez / Plan: Jah Mejias PPO / Product Type: Medicare /     Does insurance require precert for SNF: Yes    Potential assistance Purchasing Medications: No        [facility] [id number] - [place], [place] - [address]  [address]  [address]  Phone: [phone] Fax: [facility] [address], [address] [phone] -  [phone] [address] [facility] [address]  Phone: [phone] Fax: [phone]      Notes:    Factors facilitating achievement of predicted outcomes: Cooperative and Pleasant    Barriers to discharge: none    Additional Case Management Notes: Patient is a  and lives alone. She uses a Bi-pap and cane at home. Patient does her own cooking and house cleaning. She is independent with her ADL's. Patient manages her own medications and drives. She has no outside services in the home. The discharge plan is home with no services at this time. The Plan for Transition of Care is related to the following treatment goals of Elevated troponin [R77.8]  Chest pain, unspecified type [R07.9]  Chest pain [R07.9]    Transportation/Food Security/Housekeeping Addressed: No issues identified or concerns. The Patient and/or Patient Representative Agree with the Discharge Plan? Yes    The discharge plan is home with no services at this time.     Karan Alvarado, [place]  Case Management Department  Ph: [phone]

## 2023-02-17 NOTE — DISCHARGE SUMMARY
Discharge Summary    Pao Cary  :  1945  MRN:  [id number]    Admit date:  2023      Discharge date: 2023     Admitting Physician:  Susan Nielsen MD    Discharge Diagnoses:    Principal Problem:    Chest pain  Active Problems:    Elevated troponin    Bilateral pulmonary embolism (HCC)    Essential hypertension    Chronic kidney disease, stage 2 (mild)    Iron deficiency anemia due to chronic blood loss  Resolved Problems:    * No resolved hospital problems. *      Hospital Course:   Pao Cary is a 76 y.o. female admitted with chest pain. She  presented to the ER with complaints of chest pressure and \"indigestion\" most of the day. She has history of recent DVT/Bilateral PE in 2022 and is currently on Xarelto. She does have history of Brain Bleed in  no further issues. She stated she had radiation to jaw. She is on Protonix. No recent illness. No fever chills. Denied SOB, dizziness or syncope. Patient was admitted cardiology was consulted and patient underwent cardiac catheterization. Mild CAD without significant stenosis was seen. No medication changes. Hemodynamically patient is stable and at baseline. She has had no further chest pain. She is tolerating diet and activity well. Patient will be discharged today she will follow-up with primary care as an outpatient. Consultants:  Dr. Veena Silva, cardiology    Procedures: heart cath: Mild CAD without significant stenosis    Complications: none    Discharge Condition: fair    Exam:  GEN:    Awake, alert and oriented x3. EYES:   EOMI, pupils equal   NECK: Supple. No lymphadenopathy. No carotid bruit  CVS:     regular rate and rhythm, no audible murmur  PULM:  CTA, no wheezes, rales or rhonchi, no acute respiratory distress  ABD:     Bowels sounds normal.  Abdomen is soft. No distention. no tenderness to palpation. EXT:     no edema bilaterally . No calf tenderness. NEURO: Moves all extremities.   Motor and sensory are grossly intact  SKIN:    No rashes. No skin lesions    Significant Diagnostic Studies:   Lab Results   Component Value Date    WBC 7.1 02/17/2023    HGB 7.8 (L) 02/17/2023     02/17/2023       Lab Results   Component Value Date    BUN 21 02/17/2023    CREATININE 1.20 (H) 02/17/2023     02/17/2023    K 4.1 02/17/2023    CALCIUM 9.8 02/17/2023     (H) 02/17/2023    CO2 28 02/17/2023    LABGLOM 46 (L) 02/17/2023       Lab Results   Component Value Date    WBCUA None 06/09/2021    RBCUA None 06/09/2021    EPITHUA 0 TO 2 06/09/2021    LEUKOCYTESUR NEGATIVE 06/09/2021    SPECGRAV 1.010 06/09/2021    GLUCOSEU NEGATIVE 06/09/2021    KETUA NEGATIVE 06/09/2021    PROTEINU NEGATIVE 06/09/2021    HGBUR NEGATIVE 06/09/2021    CASTUA NOT REPORTED 06/09/2021    CRYSTUA NOT REPORTED 06/09/2021    BACTERIA NOT REPORTED 06/09/2021    YEAST NOT REPORTED 06/09/2021       XR CHEST PORTABLE    Result Date: 2/16/2023  EXAMINATION: ONE XRAY VIEW OF THE CHEST 2/16/2023 4:43 pm COMPARISON: 08/30/2019. HISTORY: ORDERING SYSTEM PROVIDED HISTORY: cp TECHNOLOGIST PROVIDED HISTORY: cp FINDINGS: There is no confluent airspace consolidation or effusion. Elevation the right hemidiaphragm appears unchanged. The cardiac silhouette is not enlarged. The mediastinal contour pulmonary vessels are unremarkable. No acute findings. Elevation of the right hemidiaphragm similar to the prior study.        Assessment and Plan:  Patient Active Problem List    Diagnosis Date Noted    Elevated troponin 02/17/2023    Chest pain 02/16/2023    Drug intolerance 02/14/2023    Iron deficiency anemia due to chronic blood loss 02/13/2023    Chronic kidney disease, stage 2 (mild) 12/18/2022    Bilateral pulmonary embolism ([address] 12/17/2022    Essential hypertension 12/17/2022    History of right hip replacement 01/11/2022    Osteoarthritis of right glenohumeral joint 01/11/2022        Discharge Medications:         Medication List CONTINUE taking these medications      atenolol-chlorthalidone 100-25 MG per tablet  Commonly known as: TENORETIC     calcium carbonate 600 MG Tabs tablet     DRY EYE FORMULA PO     Fish Oil 1200 MG Caps     omeprazole 20 MG delayed release capsule  Commonly known as: PRILOSEC     oxybutynin 15 MG extended release tablet  Commonly known as: Ditropan XL  Take 1 tablet by mouth daily     REFRESH DRY EYE THERAPY OP     rivaroxaban 20 MG Tabs tablet  Commonly known as: XARELTO  Take 1 tablet by mouth Daily with supper     sertraline 50 MG tablet  Commonly known as: ZOLOFT     vitamin C 500 MG tablet  Commonly known as: ASCORBIC ACID     XIIDRA OP            STOP taking these medications      vitamin E 1000 units capsule            ASK your doctor about these medications      albuterol sulfate 108 (90 Base) MCG/ACT aerosol powder inhalation  Commonly known as: PROAIR RESPICLICK              Patient Instructions:    Activity: activity as tolerated  Diet: regular diet  Wound Care: none needed  Other: None    Disposition:   Discharge to Home    Follow up:  Patient will be followed by Yasmin Salinas DO in 1-2 weeks    CORE MEASURES on Discharge (if applicable)  ACE/ARB in CHF: NA  Statin in [place]: NA  ASA in [place]: NA  Statin in CVA: NA  Antiplatelet in CVA: NA    Total time spent on discharge services: 40 minutes    Including the following activities:  Evaluation and Management of patient  Discussion with patient and/or surrogate about current care plan  Coordination with Case Management and/or   Coordination of care with Consultants (if applicable)   Coordination of care with Receiving Facility Physician (if applicable)  Completion of DME forms (if applicable)  Preparation of Discharge Summary  Preparation of Medication Reconciliation  Preparation of Discharge Prescriptions    Signed:  GRIS Cedeno CNP, GRIS, NP-C  2/17/2023, 12:59 PM

## 2023-02-17 NOTE — ED TRIAGE NOTES
Pt c/o chest \"tightness,\" onset this a.m. Admits to Hx indigestion. On Xarelto for PE. Denies SOB. C/o H/A and increased BP. Rates CP at 7/10.  in triage. Neuros intact. EKG performed.

## 2023-02-17 NOTE — ED PROVIDER NOTES
[facility] ED  EMERGENCY DEPARTMENT ENCOUNTER      Pt Name: Marci Marte  MRN: [id number]  [place] 1945  Date of evaluation: 2/16/2023  Provider: Ana Luisa Arellano MD     CHIEF COMPLAINT       Chief Complaint   Patient presents with    Chest Pain         HISTORY OF PRESENT ILLNESS   (Location/Symptom, Timing/Onset, Context/Setting, Quality, Duration, Modifying Factors, Severity) Note limiting factors. I wore a surgical mask for the entirety of this encounter. HPI    Marci Marte is a 76 y.o. female with a past medical history significant for DVTs, hypertension, and PEs currently on Xarelto who presents to the emergency department for evaluation for chest pain that she describes as pain in her left chest radiating into her jaw. She describes the pain as a pressure rating it as a 6 out of 10. She states pain has been present all day. She states initially she thought it was acid reflux but took medication without improvement hence coming to the department for evaluation. The patient states however even after taking reflux medications her symptoms have persisted. She states that she has had no fevers, no chills, no URI symptoms, no palpitations wonder diaphoresis, no shortness of breath, no abdominal pain no nausea and vomiting. Patient states around the year 2010 she did have a cardiac catheterization for similar symptoms that was unremarkable. Nursing Notes were reviewed. REVIEW OF SYSTEMS    (2+ for level 4; 10+ for level 5)   Review of Systems   Constitutional:  Negative for activity change, chills, diaphoresis, fever and unexpected weight change. HENT:  Negative for congestion and rhinorrhea. Eyes:  Negative for pain and visual disturbance. Respiratory:  Negative for shortness of breath. Cardiovascular:  Positive for chest pain. Negative for palpitations. Gastrointestinal:  Negative for abdominal pain, nausea and vomiting.    Genitourinary:  Negative for decreased urine volume and hematuria. Musculoskeletal:  Negative for arthralgias and myalgias. Skin:  Negative for wound. Neurological:  Negative for weakness and light-headedness. Psychiatric/Behavioral:  Negative for confusion. PAST MEDICAL HISTORY     Past Medical History:   Diagnosis Date    Arthritis     Blood clot in vein 04/2022    on Xarelto for two months per patient    Brain bleed ([facility] [address] 1983    Cataract     DVT (deep venous thrombosis) ([facility] [address]     History of hip replacement     History of total bilateral knee replacement     Hypertension     Sleep apnea        SURGICAL HISTORY       Past Surgical History:   Procedure Laterality Date    COLONOSCOPY  08/13/2019    JOINT REPLACEMENT Bilateral     knees and hips       CURRENT MEDICATIONS       Previous Medications    ALBUTEROL SULFATE (PROAIR RESPICLICK) 430 (90 BASE) MCG/ACT AEROSOL POWDER INHALATION    Inhale into the lungs every 4 hours as needed for Wheezing or Shortness of Breath    ATENOLOL-CHLORTHALIDONE (TENORETIC) 100-25 MG PER TABLET    Take 1 tablet by mouth daily. CALCIUM CARBONATE 600 MG TABS TABLET    Take 1 tablet by mouth 2 times daily. GLYCERIN-POLYSORBATE 80 (REFRESH DRY EYE THERAPY OP)    Apply 1 drop to eye as needed    LIFITEGRAST (XIIDRA OP)    Apply 1 drop to eye in the morning and at bedtime    MULTIPLE VITAMINS-MINERALS (DRY EYE FORMULA PO)        OMEGA-3 FATTY ACIDS (FISH OIL) 1200 MG CAPS    Take 1 tablet by mouth. OMEPRAZOLE (PRILOSEC) 20 MG CAPSULE    Take 20 mg by mouth in the morning and at bedtime    OXYBUTYNIN (DITROPAN XL) 15 MG EXTENDED RELEASE TABLET    Take 1 tablet by mouth daily    RIVAROXABAN (XARELTO) 20 MG TABS TABLET    Take 1 tablet by mouth Daily with supper    SERTRALINE (ZOLOFT) 50 MG TABLET    Take 50 mg by mouth daily. VITAMIN C (ASCORBIC ACID) 500 MG TABLET    Take 500 mg by mouth daily    VITAMIN E 1000 UNITS CAPSULE    Take 1,000 Units by mouth daily.        ALLERGIES Sulfamethoxazole-trimethoprim, Aspirin, Iron, and Sulfa antibiotics    FAMILY HISTORY       Family History   Problem Relation Age of Onset    Deep Vein Thrombosis Mother     Colon Cancer Nephew         SOCIAL HISTORY       Social History     Socioeconomic History    Marital status:    Tobacco Use    Smoking status: Never    Smokeless tobacco: Never   Vaping Use    Vaping Use: Never used   Substance and Sexual Activity    Alcohol use: Never    Drug use: Not Currently     Types: Marijuana Berneta Paco)     Comment: Medical marijuana; rarely uses    Sexual activity: Not Currently     Partners: Male       SCREENINGS    [place] Coma Scale  Eye Opening: Spontaneous  Best Verbal Response: Oriented  Best Motor Response: Obeys commands  [place] Coma Scale Score: 15      PHYSICAL EXAM    (up to 7 for level 4, 8 or more for level 5)     ED Triage Vitals [02/16/23 1916]   BP Temp Temp Source Heart Rate Resp SpO2 Height Weight   (!) 170/71 97.8 °F (36.6 °C) Oral 65 29 96 % 5' 4\" (1.626 m) 212 lb (96.2 kg)       Physical Exam  Vitals and nursing note reviewed. Constitutional:       General: She is not in acute distress. Appearance: She is not ill-appearing or toxic-appearing. HENT:      Head: Normocephalic and atraumatic. Right Ear: External ear normal.      Left Ear: External ear normal.      Nose: No congestion or rhinorrhea. Mouth/Throat:      Mouth: Mucous membranes are moist.      Pharynx: No oropharyngeal exudate. Eyes:      General: No scleral icterus. Right eye: No discharge. Left eye: No discharge. Conjunctiva/sclera: Conjunctivae normal.   Cardiovascular:      Rate and Rhythm: Normal rate. Pulses: Normal pulses. Heart sounds: No murmur heard. No gallop. Pulmonary:      Effort: Pulmonary effort is normal. No respiratory distress. Breath sounds: Normal breath sounds. No stridor. No wheezing or rhonchi. Chest:      Chest wall: No tenderness.    Abdominal: General: Abdomen is flat. There is no distension. Palpations: Abdomen is soft. Tenderness: There is no abdominal tenderness. Musculoskeletal:         General: No swelling, tenderness or deformity. Cervical back: Normal range of motion and neck supple. Skin:     General: Skin is warm. Coloration: Skin is not jaundiced. Findings: No bruising or rash. Neurological:      General: No focal deficit present. Mental Status: She is alert and oriented to person, place, and time. Psychiatric:         Mood and Affect: Mood normal.         Behavior: Behavior normal.         Thought Content: Thought content normal.       DIAGNOSTIC RESULTS       Interpretation per the Radiologist below, if available at the time of this note:  XR CHEST PORTABLE    Result Date: 2/16/2023  EXAMINATION: ONE XRAY VIEW OF THE CHEST 2/16/2023 4:43 pm COMPARISON: 08/30/2019. HISTORY: ORDERING SYSTEM PROVIDED HISTORY: cp TECHNOLOGIST PROVIDED HISTORY: cp FINDINGS: There is no confluent airspace consolidation or effusion. Elevation the right hemidiaphragm appears unchanged. The cardiac silhouette is not enlarged. The mediastinal contour pulmonary vessels are unremarkable. No acute findings. Elevation of the right hemidiaphragm similar to the prior study.       ED BEDSIDE ULTRASOUND:   Performed by ED Physician - none    LABS:  Labs Reviewed   BASIC METABOLIC PANEL - Abnormal; Notable for the following components:       Result Value    Glucose 105 (*)     BUN 25 (*)     Creatinine 1.34 (*)     Est, Glom Filt Rate 40 (*)     Potassium 3.3 (*)     All other components within normal limits   CBC WITH AUTO DIFFERENTIAL - Abnormal; Notable for the following components:    RBC 3.81 (*)     Hemoglobin 8.9 (*)     Hematocrit 30.0 (*)     MCV 78.7 (*)     MCH 23.4 (*)     RDW 16.4 (*)     Lymphocytes 20 (*)     Immature Granulocytes 1 (*)     All other components within normal limits   MAGNESIUM - Abnormal; Notable for the following components:    Magnesium 1.4 (*)     All other components within normal limits   TROPONIN - Abnormal; Notable for the following components:    Troponin, High Sensitivity 17 (*)     All other components within normal limits   PROTIME-INR - Abnormal; Notable for the following components:    Protime 22.9 (*)     All other components within normal limits   TROPONIN - Abnormal; Notable for the following components:    Troponin, High Sensitivity 17 (*)     All other components within normal limits   BRAIN NATRIURETIC PEPTIDE   D-DIMER, QUANTITATIVE   APTT        All other labs were within normal range or not returned as of this dictation. EMERGENCY DEPARTMENT COURSE and DIFFERENTIAL DIAGNOSIS/MDM:   Vitals:    Vitals:    02/16/23 2126 02/16/23 2141 02/16/23 2147 02/16/23 2201   BP: (!) 159/52 137/60 137/60 130/62   Pulse: 60 61 62 63   Resp: 28 25  13   Temp:       TempSrc:       SpO2: 91% 93%  95%   Weight:       Height:           Medications   0.9 % sodium chloride bolus (1,000 mLs IntraVENous New Bag 2/16/23 2037)   magnesium sulfate 4000 mg in 100 mL IVPB premix (4,000 mg IntraVENous New Bag 2/16/23 2150)   aspirin chewable tablet 324 mg (324 mg Oral Not Given 2/16/23 2146)   nitroGLYCERIN (NITROSTAT) SL tablet 0.4 mg (0.4 mg SubLINGual Given 2/16/23 2147)   nitroglycerin (NITRO-BID) 2 % ointment 1 inch (has no administration in time range)   potassium chloride (KLOR-CON M) extended release tablet 40 mEq (40 mEq Oral Given 2/16/23 2038)   potassium chloride 10 mEq/100 mL IVPB (Peripheral Line) (0 mEq IntraVENous Stopped 2/16/23 2144)       MDM  . ED Course as of 02/16/23 2218   Thu Feb 16, 2023 2008 EKG reviewed by myself with sinus rhythm with a first-degree heart block. Patient with no ST elevations or depressions concerning for STEMI. EKG with borderline left axis deviation. Intervals within normal limits otherwise. GEN change from previous EKG from December 19, 2022.   EKG interpreted by myself. [PK]   2010 Chest x-ray with elevated left hemidiaphragm. No consolidations or infiltrates appreciated. Costophrenic angles sharp. No vascularization. Cardiac silhouette stable compared to December 2020 2012. [PK]   2018 10 mEq of potassium IV and 40 p.o. [PK]   2216 G repeated given elevated troponin. EKG with sinus bradycardia with a rate of 59. Patient with no ST elevations or depressions concerning for STEMI. EKG continues to have left axis deviation. First-degree heart block redemonstrated. EKG otherwise unchanged from previous. EKG interpreted by myself. [PK]      ED Course User Index  [PK] Geeta Tan MD       Presenting for evaluation for chest pain radiating into the jaw. Patient's presentation is concerning for an acute coronary event versus acid clear flux, and less likely due to an infectious process versus pneumothorax versus PEs and is on Xarelto. Work-up in the department with EKG as noted above. Chest x-ray also as noted above. Radiology over read within unremarkable checks x-ray. Patient with no leukocytosis with mild anemia with a hemoglobin of 8.9 which is slightly decreased from 9.92 months ago. Mild CONCEPCIÓN appreciated with a creatinine of 1.34 up from 1.1 previously. Patient with a GFR of 40%. BNP within normal limits at 193 with an elevated troponin of 17. Patient with hypomagnesemia with a magnesium of 1.4  repleted with 4 g of magnesium in the department. Potassium also low at 3.3. Patient received 40 mEq p.o. and 10 mEq IV push potassium. Repeat troponin at 17. Given patient's presentation patient received aspirin 324 mg p.o. and nitroglycerin. On reevaluation improvement of pain after nitroglycerin 0.4 mg sublingual x1 tablet. As a result patient was placed on nitroglycerin paste 1 inch. This finding is less concern for NSTEMI.   Patient was discussed with Dr. Sally Luciano who recommended patient to this facility for cardiac catheterization in the morning. Discussed lab and imaging results with the patient. Discussed with the patient my concern for possible NSTEMI versus unstable angina. Discussed plan for admission for further cardiac work-up. Patient verbalized understanding of information given and agreed to plan. Patient was discussed with Dr. Beltran Chang with admitting service accepted patient for admission to his service. Patient was admitted in stable condition. CONSULTS:  IP CONSULT TO CASE MANAGEMENT  IP CONSULT TO CARDIOLOGY    PROCEDURES:  Unless otherwise noted below, none     Procedures    FINAL IMPRESSION      1. Chest pain, unspecified type    2. Elevated troponin          DISPOSITION/PLAN   DISPOSITION Decision To Admit 02/16/2023 10:17:55 PM      PATIENT REFERRED TO:  No follow-up provider specified. DISCHARGE MEDICATIONS:  New Prescriptions    No medications on file          (Please note:  Portions of this note were completed with a voice recognition program.  Efforts were made to edit the dictations but occasionally words and phrases are mis-transcribed.)  Form v2016. J.[id number]    Zeinab Rome MD (electronically signed)  Emergency Medicine Provider      Zeinab Rome MD  02/16/23 4628

## 2023-02-17 NOTE — CONSULTS
Patient: Luis Miguel Maria  : 1945  Date of Admission: 2023  Primary Care Physician: Stacy Alonso  Today's Date: 2023    REASON FOR CONSULTATION/CC: Unstable angina. HPI:  Ms. Mike Yee is a 76 y.o. female with no prior cardiac history who presented to the restroom and yesterday because of retrosternal chest tightness/pressure. The pain was intermittent. It radiated to that neck and jaws. She has a left frozen shoulder so she was not sure that the pain in her shoulder but radiating pain or it is her arthritis. She has history of DVT and bilateral pulmonary embolism currently on Xarelto. Her D-dimer in the emergency room is negative. ECG and serial troponins are unremarkable. I had a long discussion with the emergency room physician and also with the patient regarding her current symptoms. Her symptoms are suggestive of coronary artery disease, low risk unstable angina. She said her pain is so different than her gastroesophageal reflux disease pain. She has history of hiatal hernia and taking Prilosec but this was different, intermittent chest pressure happening at rest.    We discussed doing an echo and Lexiscan stress test but I think the chance of her having significant coronary disease is high so even if the stress test came back negative this would not be safe to discharge her saying that there is no cardiac issues. I think the best approach for her is to proceed with cardiac catheterization. Patient agreed. She had a cardiac cath back in 2012 was done at [facility] and she reported no significant coronary artery disease.     Past Medical History:   Diagnosis Date    Arthritis     Blood clot in vein 2022    on Xarelto for two months per patient    Brain bleed ([facility] [address]     Cataract     DVT (deep venous thrombosis) ([facility] [address]     History of hip replacement     History of total bilateral knee replacement     Hypertension     Sleep apnea        CURRENT ALLERGIES: Sulfamethoxazole-trimethoprim, Aspirin, Iron, and Sulfa antibiotics REVIEW OF SYSTEMS: 14 systems were reviewed. Pertinent positives and negatives as above, all else negative. Past Surgical History:   Procedure Laterality Date    COLONOSCOPY  08/13/2019    JOINT REPLACEMENT Bilateral     knees and hips    Social History:  Social History     Tobacco Use    Smoking status: Never    Smokeless tobacco: Never   Vaping Use    Vaping Use: Never used   Substance Use Topics    Alcohol use: Never    Drug use: Not Currently     Types: Marijuana Adrienne Santos)     Comment: Medical marijuana; rarely uses        CURRENT MEDICATIONS:  Prior to Admission medications    Medication Sig Start Date End Date Taking? Authorizing Provider   albuterol sulfate (PROAIR RESPICLICK) 299 (90 Base) MCG/ACT aerosol powder inhalation Inhale into the lungs every 4 hours as needed for Wheezing or Shortness of Breath  Patient not taking: No sig reported    Historical Provider, MD   rivaroxaban (XARELTO) 20 MG TABS tablet Take 1 tablet by mouth Daily with supper 1/8/23   Kirsten Batista, APRN - CNP   Lifitegrast (XIIDRA OP) Apply 1 drop to eye in the morning and at bedtime    Historical Provider, MD   vitamin C (ASCORBIC ACID) 500 MG tablet Take 500 mg by mouth daily    Historical Provider, MD   Glycerin-Polysorbate 80 (REFRESH DRY EYE THERAPY OP) Apply 1 drop to eye as needed    Historical Provider, MD   oxybutynin (DITROPAN XL) 15 MG extended release tablet Take 1 tablet by mouth daily 2/1/22   [facility], DO   Multiple Vitamins-Minerals (DRY EYE FORMULA PO)     Historical Provider, MD   omeprazole (PRILOSEC) 20 MG capsule Take 20 mg by mouth in the morning and at bedtime    Historical Provider, MD   atenolol-chlorthalidone (TENORETIC) 100-25 MG per tablet Take 1 tablet by mouth daily. Historical Provider, MD   sertraline (ZOLOFT) 50 MG tablet Take 50 mg by mouth daily.     Historical Provider, MD   Omega-3 Fatty Acids ([address]) 1200 MG CAPS Take 1 tablet by mouth. Historical Provider, MD   vitamin E 1000 UNITS capsule Take 1,000 Units by mouth daily. Patient not taking: No sig reported    Historical Provider, MD   calcium carbonate 600 MG TABS tablet Take 1 tablet by mouth 2 times daily. Historical Provider, MD Claudene Hummer Hold] aspirin  324 mg Oral Once    [MAR Hold] calcium carbonate  1 tablet Oral BID    [MAR Hold] Lifitegrast  1 drop Ophthalmic BID    [MAR Hold] Fish Oil  1,200 mg Oral Lunch    [MAR Hold] pantoprazole  40 mg Oral BID AC    [MAR Hold] oxybutynin  15 mg Oral Daily    [MAR Hold] rivaroxaban  20 mg Oral Dinner    [MAR Hold] sertraline  50 mg Oral Daily    [MAR Hold] sodium chloride flush  5-40 mL IntraVENous 2 times per day    [MAR Hold] atenolol  100 mg Oral Daily    And    [MAR Hold] chlorthalidone  25 mg Oral Daily           FAMILY HISTORY: family history includes Colon Cancer in her nephew; Deep Vein Thrombosis in her mother. PHYSICAL EXAM:   /88   Pulse 63   Temp 97.7 °F (36.5 °C) (Temporal)   Resp 16   Ht 5' 4\" (1.626 m)   Wt 213 lb 3 oz (96.7 kg)   SpO2 93%   BMI 36.59 kg/m²  Body mass index is 36.59 kg/m². Constitutional: She is oriented to person, place, and time. She appears well-developed and well-nourished. In no acute distress. HEENT: Normocephalic and atraumatic. No JVD present. Carotid bruit is not present. No mass and no thyromegaly present. No lymphadenopathy present. Cardiovascular: Normal rate, regular rhythm, normal heart sounds and intact distal pulses. Exam reveals no gallop and no friction rub. No murmur. Pulmonary/Chest: Effort normal and breath sounds normal. No respiratory distress. She has no wheezes, rhonchi or rales. Abdominal: Soft, non-tender. Bowel sounds and aorta are normal. She exhibits no organomegaly, mass or bruit. Extremities: No edema, cyanosis, or clubbing. Pulses are 2+ radial/carotid/dorsalis pedis and posterior tibial bilaterally.   Neurological: She is alert and oriented to person, place, and time. No evidence of gross cranial nerve deficit. Coordination appeared normal.   Skin: Skin is warm and dry. There is no rash or diaphoresis. Psychiatric: She has a normal mood and affect. Her speech is normal and behavior is normal.      MOST RECENT LABS ON RECORD:   Lab Results   Component Value Date    WBC 7.1 02/17/2023    HGB 7.8 (L) 02/17/2023    HCT 26.9 (L) 02/17/2023     02/17/2023    CHOL 198 11/07/2016    TRIG 105 11/07/2016    HDL 51 11/07/2016    ALT 10 02/17/2023    AST 16 02/17/2023     02/17/2023    K 4.1 02/17/2023     (H) 02/17/2023    CREATININE 1.20 (H) 02/17/2023    BUN 21 02/17/2023    CO2 28 02/17/2023    TSH 4.29 02/16/2016    INR 2.1 02/16/2023    LABA1C 5.5 05/02/2014       ASSESSMENT:  Ischemic chest pain consistent with low risk unstable angina. Essential hypertension. Gastroesophageal reflux disease. Hiatal hernia. History of bilateral pulmonary embolism  Chronic anticoagulation. PLAN:  From a cardiovascular standpoint, I believe that Ms. Joyce's current symptoms are most consistent with a relatively typical chest discomfort suggestive of ongoing cardiac ischemia. Based on his very typical symptoms and risk factors, I believe she is at a high likelihood of having significant coronary artery disease. Risk factors for coronary artery disease include hypertension and age. Initial work-up in the emergency room was unremarkable. We discussed having stress test and an echo versus proceeding with a cardiac catheterization. After going through that advantage plan that best advantage of post procedures, I think it is more reasonable to proceed with cardiac catheterization giving her typical chest pain    Heart catheterization with coronary angiography: For these reasons, I recommended that Ms. Lokesh Atkinson consider undergoing a cardiac catheterization with coronary angiography to assess her coronary anatomy and facilitate better treatment recommendations. I discussed the risks, benefits, and alternatives to the procedure including the risk of bleeding, contrast induced allergy and/or kidney damage, arrythmia, stroke, heart attack, death, or the need for further procedures. I also discussed the fact that although treatment with simple medical management is a potential treatment option in place of cardiac catheterization, I expressed my opinion that cardiac catheterization in order to define her coronary anatomy and rule out severe 3 vessel or left main coronary artery disease would significant help guide the most appropriate treatment strategy ranging from no treatment to medications, to stents, to even bypass surgery. Ms. Felix Manning verbalized understanding of the risks benefits and alternatives and stated that she would like to proceed with heart catheterization. I also discussed the advantages and disadvantages of having her procedure performed here at [facility] vs. a larger hospital such as [facility]. Beyond the obvious advantage of convenience, I also explained potential disadvantages including the inability to have immediate cardiac stenting performed or the presence of on site CT surgical backup. However, because of the lack of significant high risk feature on her stress test, I did tell her I thought that in her particular case, it would likely be safe to perform the procedure here. Therefore, after considering the options, Ms. Felix Manning said she would prefer to have the procedure performed here at [facility] and so I scheduled the coronary angiography for today    Continue aspirin, Xarelto, atenolol and diuretic therapy. Further work-up will be decided based on the cardiac cath result. For the time being, Ms. Felix Manning says that she would like to discuss the options further with his family and will hopefully be able to give us an answer in the morning.     In the meantime, I suggest that we continue to treat her with an aspirin, beta-blocker and statin therapy. Once again, thank you for allowing me to participate in this patients care. Please do not hesitate to contact me could I be of further assistance. Sincerely,  Chu Garcia MD, MS, F.A.C.C.   [facility], [address] [place] [address]  Phone: [phone], Fax: [phone]    Based on the patients current medical conditions, I believe the risk of significant morbidity and mortality is: High

## 2023-02-17 NOTE — PROGRESS NOTES
Vitals and assessment done at this time. See flow sheet for more details. Pt resting in bed at this time. Pt denied any numbness or tingling in hands or feet. Pt denied any chest pain or palpitations at this time. Call light within reach. Will continue to monitor.

## 2023-02-17 NOTE — DISCHARGE INSTR - DIET

## 2023-02-17 NOTE — PROGRESS NOTES
Patient returned to pre/post area. Alert and oriented, denies pain. Right radial palpable distal to puncture site. Pressure dressing in place no bleeding/bruising/swelling noted. Will continue to monitor.

## 2023-02-17 NOTE — PROGRESS NOTES
Writer reviewed discharge instructions with patient and sister. Patient verbalized understanding. Denies questions. Copy of discharge instructions given to patient.

## 2023-02-17 NOTE — PLAN OF CARE
Problem: Discharge Planning  Goal: Discharge to home or other facility with appropriate resources  Outcome: Progressing  Flowsheets (Taken 2/17/2023 0152)  Discharge to home or other facility with appropriate resources:   Identify barriers to discharge with patient and caregiver   Identify discharge learning needs (meds, wound care, etc)     Problem: Safety - Adult  Goal: Free from fall injury  Outcome: Progressing  Flowsheets (Taken 2/17/2023 0152)  Free From Fall Injury: Instruct family/caregiver on patient safety

## 2023-02-17 NOTE — PROGRESS NOTES
Writer at bedside to complete second shift assessment. Assessment and vital signs completed, see flow sheet for details. Pt denies any needs at this time. Call light within reach. Will continue to monitor.

## 2023-02-17 NOTE — PROGRESS NOTES
Writer at bedside to complete admission assessment, navigator and vital signs. Pt arrived to floor via ER bed. Pt transfered independently. Shift assessment, vital signs and addmission navigator complete, see flow sheet for details. Pt stated pain level is 0/10. Pt denies any other needs at this time. Will continue to monitor.

## 2023-02-17 NOTE — OP NOTE
-  Coronary Angiography Brief Post Operative Note:    Mild coronary artery disease without any significant focal stenosis. Normal left ventricular end diastolic pressure (LVEDP). Continue standard risk factor modification as clinically indicated and consider alternative etiologies of the patients symptoms.      Electronically signed by Eddie Fisher MD on 2/17/2023 at 10:54 AM

## 2023-02-17 NOTE — DISCHARGE INSTRUCTIONS
Discharge Instructions for Cardiac Catheterization    A cardiac catheterization is a diagnostic test used to evaluate the health of the heart and its blood vessels. The test is done with a thin catheter carefully threaded into your heart from a leg or arm artery. Most likely, you will be allowed to go home the same day as the procedure. Steps to Take at Home:   Pain- apply ice to site 15-20 minutes every hour for the first 2 days. Showering is okay 24 hours after procedure. No soaking in a pool, hot tub, bath tub, or standing water for one week. Bleeding (outward or under the skin-hematoma)- apply firm pressure for 10-15 minutes or until the bleeding stops, then call your doctor. If unable to get bleeding stopped, call 911. Kidney damage- Call if you urinate less than normal, have swelling or feel puffy, and/or gain 2 or more pounds over night in the first week. If procedure was in ARM:  You were instructed to keep wrist straight and still for two hours after the procedure. The arm and hand may now be used for normal daily activities except, avoid using the heal of hand while getting up and down from furniture for the first few days. Keep affected arm elevated, hand higher than elbow, while pressure dressing in place to decrease swelling. 1)  Gauze and Elastoplast   Remove in 4 hours as follows:     TIME______5:00 pm_________________  Remove 1 piece of tape at a time, waiting 15 -20 minutes between layers to monitor for bleeding. If dressing sticks, place wrist under cool running water to help loosen gauze from site then pat site dry. *** If hand feels numb, tingly, and/or cold- loosen first 1-2 layers of tape if dressing still in place. If no relief noticed, remove pressure dressing as per above instructions. Seek medical help if no relief or if dressing already off. Wash area with soap and water daily while leaving it open to air, no bandaids or ointment.     Diet   Drink plenty of fluids after the test to flush the x-ray dye from your system. Return to your normal diet. No alcoholic beverages for 24 hours after the procedure. Physical Activity   The sedative will make you sleepy. Rest until the effects have worn off. Nausea and vomiting from the sedative is normal and usually does not last long. Ask your doctor when you will be able to return to work. Do not drive, operate machinery, do anything that requires attention to detail, or sign important papers for at least 24 hours or until your doctor says it is safe. Avoid heavy lifting, physically demanding activities, and sexual activity for 2-3 days. Lift nothing over 10 pounds (a gallon of milk is 8 pounds). Do not sit for long periods of time. Try to change positions frequently. Medications   Resume taking your normal medicines as advised. Use acetaminophen (Tylenol) for pain relief. (Avoid anti-inflammatory drugs such as- ibuprofen (Advil, Motrin), naproxen sodium (Aleve), Excedrin for a few days)  If you had to stop taking these medications before the procedure, ask your doctor when you can resume taking them:   Anti-inflammatory drugs   Blood thinners, such as warfarin (Coumadin)   If you are taking medicines, follow these general guidelines:   Take your medicine as directed. Do not change the amount or the schedule. Do not stop taking them without talking to your doctor. Do not share them. Know the side effects and report any to your doctor. Some drugs can be dangerous when mixed. Talk to a doctor or pharmacist if you are taking more than one drug. This includes over-the-counter medicine and herb or dietary supplements. Plan ahead for refills so you don't run out. Follow-up   The test results are available right after the procedure. At that point, the doctor will discuss the findings and suggest appropriate treatment options. In some cases, the results can indicate an immediate need for surgery.  Schedule a follow-up appointment as directed by your doctor. Call Your Doctor If Any of the Following Occurs   Signs of infection- including fever and chills   Redness, swelling, increasing pain, feels warm to touch, red streak forming from site, or any discharge from the procedure site.    Call 911 If Any of the Following Occurs   Drooping facial muscles   Changes in vision or speech   Difficulty walking or using your limbs   Change in sensation, including numbness, feeling cold, or change in color   Extreme sweating, nausea or vomiting   Dizziness or lightheadedness   Chest pain   Rapid, irregular heartbeat   Palpitations   Cough, shortness of breath, or difficulty breathing   Weakness or fainting   If you think you have an emergency, CALL 911

## 2023-02-20 LAB
EKG ATRIAL RATE: 57 BPM
EKG ATRIAL RATE: 59 BPM
EKG ATRIAL RATE: 68 BPM
EKG P AXIS: 53 DEGREES
EKG P AXIS: 75 DEGREES
EKG P AXIS: 77 DEGREES
EKG P-R INTERVAL: 276 MS
EKG P-R INTERVAL: 284 MS
EKG P-R INTERVAL: 286 MS
EKG Q-T INTERVAL: 416 MS
EKG Q-T INTERVAL: 436 MS
EKG Q-T INTERVAL: 440 MS
EKG QRS DURATION: 96 MS
EKG QTC CALCULATION (BAZETT): 424 MS
EKG QTC CALCULATION (BAZETT): 435 MS
EKG QTC CALCULATION (BAZETT): 442 MS
EKG R AXIS: -21 DEGREES
EKG R AXIS: -8 DEGREES
EKG R AXIS: 6 DEGREES
EKG T AXIS: 30 DEGREES
EKG T AXIS: 44 DEGREES
EKG T AXIS: 74 DEGREES
EKG VENTRICULAR RATE: 57 BPM
EKG VENTRICULAR RATE: 59 BPM
EKG VENTRICULAR RATE: 68 BPM

## 2023-02-20 PROCEDURE — 93010 ELECTROCARDIOGRAM REPORT: CPT | Performed by: INTERNAL MEDICINE

## 2023-02-21 ENCOUNTER — HOSPITAL ENCOUNTER (OUTPATIENT)
Dept: GENERAL RADIOLOGY | Age: 78
Discharge: HOME | End: 2023-02-23
Payer: MEDICARE

## 2023-02-21 ENCOUNTER — HOSPITAL ENCOUNTER (OUTPATIENT)
Age: 78
Discharge: HOME | End: 2023-02-23
Payer: MEDICARE

## 2023-02-21 DIAGNOSIS — M79.671 RIGHT FOOT PAIN: ICD-10-CM

## 2023-02-21 DIAGNOSIS — S90.31XA BRUISED SOLE OF FOOT, RIGHT, INITIAL ENCOUNTER: ICD-10-CM

## 2023-02-21 PROCEDURE — 73630 X-RAY EXAM OF FOOT: CPT

## 2023-02-24 ENCOUNTER — HOSPITAL ENCOUNTER (OUTPATIENT)
Dept: INFUSION THERAPY | Age: 78
Discharge: HOME | End: 2023-02-24
Payer: MEDICARE

## 2023-02-24 VITALS
TEMPERATURE: 98 F | DIASTOLIC BLOOD PRESSURE: 64 MMHG | RESPIRATION RATE: 18 BRPM | SYSTOLIC BLOOD PRESSURE: 120 MMHG | HEART RATE: 60 BPM

## 2023-02-24 DIAGNOSIS — D50.0 IRON DEFICIENCY ANEMIA DUE TO CHRONIC BLOOD LOSS: ICD-10-CM

## 2023-02-24 DIAGNOSIS — Z78.9 DRUG INTOLERANCE: Primary | ICD-10-CM

## 2023-02-24 PROCEDURE — 6360000002 HC RX W HCPCS: Performed by: INTERNAL MEDICINE

## 2023-02-24 PROCEDURE — 96409 CHEMO IV PUSH SNGL DRUG: CPT

## 2023-02-24 PROCEDURE — 2580000003 HC RX 258: Performed by: INTERNAL MEDICINE

## 2023-02-24 RX ORDER — SODIUM CHLORIDE 0.9 % (FLUSH) 0.9 %
5-40 SYRINGE (ML) INJECTION PRN
Status: CANCELLED | OUTPATIENT
Start: 2023-02-27

## 2023-02-24 RX ORDER — FAMOTIDINE 10 MG/ML
20 INJECTION, SOLUTION INTRAVENOUS
Status: CANCELLED | OUTPATIENT
Start: 2023-02-27

## 2023-02-24 RX ORDER — SODIUM CHLORIDE 9 MG/ML
INJECTION, SOLUTION INTRAVENOUS CONTINUOUS
Status: CANCELLED | OUTPATIENT
Start: 2023-02-27

## 2023-02-24 RX ORDER — ONDANSETRON 2 MG/ML
8 INJECTION INTRAMUSCULAR; INTRAVENOUS
Status: CANCELLED | OUTPATIENT
Start: 2023-02-27

## 2023-02-24 RX ORDER — EPINEPHRINE 1 MG/ML
0.3 INJECTION, SOLUTION, CONCENTRATE INTRAVENOUS PRN
Status: CANCELLED | OUTPATIENT
Start: 2023-02-27

## 2023-02-24 RX ORDER — ALBUTEROL SULFATE 90 UG/1
4 AEROSOL, METERED RESPIRATORY (INHALATION) PRN
Status: CANCELLED | OUTPATIENT
Start: 2023-02-27

## 2023-02-24 RX ORDER — SODIUM CHLORIDE 0.9 % (FLUSH) 0.9 %
5-40 SYRINGE (ML) INJECTION PRN
Status: DISCONTINUED | OUTPATIENT
Start: 2023-02-24 | End: 2023-02-25 | Stop reason: HOSPADM

## 2023-02-24 RX ORDER — HEPARIN SODIUM (PORCINE) LOCK FLUSH IV SOLN 100 UNIT/ML 100 UNIT/ML
500 SOLUTION INTRAVENOUS PRN
Status: CANCELLED | OUTPATIENT
Start: 2023-02-27

## 2023-02-24 RX ORDER — SODIUM CHLORIDE 9 MG/ML
INJECTION, SOLUTION INTRAVENOUS CONTINUOUS
Status: ACTIVE | OUTPATIENT
Start: 2023-02-24 | End: 2023-02-24

## 2023-02-24 RX ORDER — ACETAMINOPHEN 325 MG/1
650 TABLET ORAL
Status: CANCELLED | OUTPATIENT
Start: 2023-02-27

## 2023-02-24 RX ORDER — SODIUM CHLORIDE 9 MG/ML
5-250 INJECTION, SOLUTION INTRAVENOUS PRN
Status: CANCELLED | OUTPATIENT
Start: 2023-02-27

## 2023-02-24 RX ORDER — DIPHENHYDRAMINE HYDROCHLORIDE 50 MG/ML
50 INJECTION INTRAMUSCULAR; INTRAVENOUS
Status: CANCELLED | OUTPATIENT
Start: 2023-02-27

## 2023-02-24 RX ADMIN — SODIUM CHLORIDE, PRESERVATIVE FREE 10 ML: 5 INJECTION INTRAVENOUS at 11:22

## 2023-02-24 RX ADMIN — SODIUM CHLORIDE, PRESERVATIVE FREE 10 ML: 5 INJECTION INTRAVENOUS at 11:34

## 2023-02-24 RX ADMIN — IRON SUCROSE 200 MG: 20 INJECTION, SOLUTION INTRAVENOUS at 11:24

## 2023-03-01 ENCOUNTER — HOSPITAL ENCOUNTER (OUTPATIENT)
Dept: INFUSION THERAPY | Age: 78
Discharge: HOME | End: 2023-03-01
Payer: MEDICARE

## 2023-03-01 VITALS
HEART RATE: 56 BPM | TEMPERATURE: 98 F | DIASTOLIC BLOOD PRESSURE: 60 MMHG | RESPIRATION RATE: 18 BRPM | SYSTOLIC BLOOD PRESSURE: 107 MMHG

## 2023-03-01 DIAGNOSIS — Z78.9 DRUG INTOLERANCE: Primary | ICD-10-CM

## 2023-03-01 DIAGNOSIS — D50.0 IRON DEFICIENCY ANEMIA DUE TO CHRONIC BLOOD LOSS: ICD-10-CM

## 2023-03-01 PROCEDURE — 6360000002 HC RX W HCPCS: Performed by: INTERNAL MEDICINE

## 2023-03-01 PROCEDURE — 96374 THER/PROPH/DIAG INJ IV PUSH: CPT

## 2023-03-01 PROCEDURE — 2580000003 HC RX 258: Performed by: INTERNAL MEDICINE

## 2023-03-01 RX ORDER — SODIUM CHLORIDE 9 MG/ML
INJECTION, SOLUTION INTRAVENOUS CONTINUOUS
Status: CANCELLED | OUTPATIENT
Start: 2023-03-06

## 2023-03-01 RX ORDER — DIPHENHYDRAMINE HYDROCHLORIDE 50 MG/ML
50 INJECTION INTRAMUSCULAR; INTRAVENOUS
Status: CANCELLED | OUTPATIENT
Start: 2023-03-06

## 2023-03-01 RX ORDER — SODIUM CHLORIDE 0.9 % (FLUSH) 0.9 %
5-40 SYRINGE (ML) INJECTION PRN
Status: CANCELLED | OUTPATIENT
Start: 2023-03-06

## 2023-03-01 RX ORDER — HEPARIN SODIUM (PORCINE) LOCK FLUSH IV SOLN 100 UNIT/ML 100 UNIT/ML
500 SOLUTION INTRAVENOUS PRN
Status: CANCELLED | OUTPATIENT
Start: 2023-03-06

## 2023-03-01 RX ORDER — EPINEPHRINE 1 MG/ML
0.3 INJECTION, SOLUTION, CONCENTRATE INTRAVENOUS PRN
Status: CANCELLED | OUTPATIENT
Start: 2023-03-06

## 2023-03-01 RX ORDER — ONDANSETRON 2 MG/ML
8 INJECTION INTRAMUSCULAR; INTRAVENOUS
Status: CANCELLED | OUTPATIENT
Start: 2023-03-06

## 2023-03-01 RX ORDER — SODIUM CHLORIDE 9 MG/ML
5-250 INJECTION, SOLUTION INTRAVENOUS PRN
Status: CANCELLED | OUTPATIENT
Start: 2023-03-06

## 2023-03-01 RX ORDER — FAMOTIDINE 10 MG/ML
20 INJECTION, SOLUTION INTRAVENOUS
Status: CANCELLED | OUTPATIENT
Start: 2023-03-06

## 2023-03-01 RX ORDER — ALBUTEROL SULFATE 90 UG/1
4 AEROSOL, METERED RESPIRATORY (INHALATION) PRN
Status: CANCELLED | OUTPATIENT
Start: 2023-03-06

## 2023-03-01 RX ORDER — SODIUM CHLORIDE 0.9 % (FLUSH) 0.9 %
5-40 SYRINGE (ML) INJECTION PRN
Status: DISCONTINUED | OUTPATIENT
Start: 2023-03-01 | End: 2023-03-02 | Stop reason: HOSPADM

## 2023-03-01 RX ORDER — ACETAMINOPHEN 325 MG/1
650 TABLET ORAL
Status: CANCELLED | OUTPATIENT
Start: 2023-03-06

## 2023-03-01 RX ADMIN — IRON SUCROSE 200 MG: 20 INJECTION, SOLUTION INTRAVENOUS at 14:18

## 2023-03-01 RX ADMIN — SODIUM CHLORIDE, PRESERVATIVE FREE 10 ML: 5 INJECTION INTRAVENOUS at 14:15

## 2023-03-01 RX ADMIN — SODIUM CHLORIDE, PRESERVATIVE FREE 10 ML: 5 INJECTION INTRAVENOUS at 14:30

## 2023-03-06 ENCOUNTER — HOSPITAL ENCOUNTER (OUTPATIENT)
Dept: INFUSION THERAPY | Age: 78
Discharge: HOME | End: 2023-03-06
Payer: MEDICARE

## 2023-03-06 VITALS
HEART RATE: 55 BPM | DIASTOLIC BLOOD PRESSURE: 71 MMHG | SYSTOLIC BLOOD PRESSURE: 130 MMHG | RESPIRATION RATE: 18 BRPM | TEMPERATURE: 98.1 F

## 2023-03-06 DIAGNOSIS — D50.0 IRON DEFICIENCY ANEMIA DUE TO CHRONIC BLOOD LOSS: ICD-10-CM

## 2023-03-06 DIAGNOSIS — Z78.9 DRUG INTOLERANCE: Primary | ICD-10-CM

## 2023-03-06 PROCEDURE — 96374 THER/PROPH/DIAG INJ IV PUSH: CPT

## 2023-03-06 PROCEDURE — 2580000003 HC RX 258: Performed by: INTERNAL MEDICINE

## 2023-03-06 PROCEDURE — 6360000002 HC RX W HCPCS: Performed by: INTERNAL MEDICINE

## 2023-03-06 RX ORDER — SODIUM CHLORIDE 0.9 % (FLUSH) 0.9 %
5-40 SYRINGE (ML) INJECTION PRN
Status: DISCONTINUED | OUTPATIENT
Start: 2023-03-06 | End: 2023-03-07 | Stop reason: HOSPADM

## 2023-03-06 RX ORDER — SODIUM CHLORIDE 9 MG/ML
INJECTION, SOLUTION INTRAVENOUS CONTINUOUS
Status: CANCELLED | OUTPATIENT
Start: 2023-03-13

## 2023-03-06 RX ORDER — FAMOTIDINE 10 MG/ML
20 INJECTION, SOLUTION INTRAVENOUS
Status: CANCELLED | OUTPATIENT
Start: 2023-03-13

## 2023-03-06 RX ORDER — EPINEPHRINE 1 MG/ML
0.3 INJECTION, SOLUTION, CONCENTRATE INTRAVENOUS PRN
Status: CANCELLED | OUTPATIENT
Start: 2023-03-13

## 2023-03-06 RX ORDER — DIPHENHYDRAMINE HYDROCHLORIDE 50 MG/ML
50 INJECTION INTRAMUSCULAR; INTRAVENOUS
Status: CANCELLED | OUTPATIENT
Start: 2023-03-13

## 2023-03-06 RX ORDER — ACETAMINOPHEN 325 MG/1
650 TABLET ORAL
Status: CANCELLED | OUTPATIENT
Start: 2023-03-13

## 2023-03-06 RX ORDER — ONDANSETRON 2 MG/ML
8 INJECTION INTRAMUSCULAR; INTRAVENOUS
Status: CANCELLED | OUTPATIENT
Start: 2023-03-13

## 2023-03-06 RX ORDER — HEPARIN SODIUM (PORCINE) LOCK FLUSH IV SOLN 100 UNIT/ML 100 UNIT/ML
500 SOLUTION INTRAVENOUS PRN
Status: CANCELLED | OUTPATIENT
Start: 2023-03-13

## 2023-03-06 RX ORDER — SODIUM CHLORIDE 9 MG/ML
INJECTION, SOLUTION INTRAVENOUS CONTINUOUS
Status: DISCONTINUED | OUTPATIENT
Start: 2023-03-06 | End: 2023-03-07 | Stop reason: HOSPADM

## 2023-03-06 RX ORDER — SODIUM CHLORIDE 9 MG/ML
5-250 INJECTION, SOLUTION INTRAVENOUS PRN
Status: CANCELLED | OUTPATIENT
Start: 2023-03-13

## 2023-03-06 RX ORDER — SODIUM CHLORIDE 0.9 % (FLUSH) 0.9 %
5-40 SYRINGE (ML) INJECTION PRN
Status: CANCELLED | OUTPATIENT
Start: 2023-03-13

## 2023-03-06 RX ORDER — ALBUTEROL SULFATE 90 UG/1
4 AEROSOL, METERED RESPIRATORY (INHALATION) PRN
Status: CANCELLED | OUTPATIENT
Start: 2023-03-13

## 2023-03-06 RX ADMIN — IRON SUCROSE 200 MG: 20 INJECTION, SOLUTION INTRAVENOUS at 13:27

## 2023-03-06 RX ADMIN — SODIUM CHLORIDE, PRESERVATIVE FREE 10 ML: 5 INJECTION INTRAVENOUS at 13:26

## 2023-03-06 RX ADMIN — SODIUM CHLORIDE, PRESERVATIVE FREE 10 ML: 5 INJECTION INTRAVENOUS at 13:39

## 2023-03-07 ENCOUNTER — TELEPHONE (OUTPATIENT)
Dept: GASTROENTEROLOGY | Age: 78
End: 2023-03-07

## 2023-03-07 RX ORDER — SODIUM, POTASSIUM,MAG SULFATES 17.5-3.13G
SOLUTION, RECONSTITUTED, ORAL ORAL
Qty: 1 EACH | Refills: 0 | Status: SHIPPED | OUTPATIENT
Start: 2023-03-07

## 2023-03-07 NOTE — TELEPHONE ENCOUNTER
Patient had called stating oncology center is stating she needs colonoscopy sooner than June 27th. Called patient back and scheduled her for April 25th and added to the high priority list if any cancellations to get done sooner. Patient agreed and stated she will let oncology center know and if they feel it needs done sooner than 4/25 - she may have to go elsewhere. Explained Xarelto will need to be held for 3 days prior to procedure date. Verbalized understanding. Suprep bowel prep ordered / prep instructions mailed.

## 2023-03-13 ENCOUNTER — HOSPITAL ENCOUNTER (OUTPATIENT)
Dept: INFUSION THERAPY | Age: 78
Discharge: HOME | End: 2023-03-13
Payer: MEDICARE

## 2023-03-13 VITALS
TEMPERATURE: 98.4 F | DIASTOLIC BLOOD PRESSURE: 62 MMHG | RESPIRATION RATE: 18 BRPM | HEART RATE: 57 BPM | SYSTOLIC BLOOD PRESSURE: 96 MMHG

## 2023-03-13 DIAGNOSIS — D50.0 IRON DEFICIENCY ANEMIA DUE TO CHRONIC BLOOD LOSS: ICD-10-CM

## 2023-03-13 DIAGNOSIS — Z78.9 DRUG INTOLERANCE: Primary | ICD-10-CM

## 2023-03-13 PROCEDURE — 6360000002 HC RX W HCPCS: Performed by: INTERNAL MEDICINE

## 2023-03-13 PROCEDURE — 2580000003 HC RX 258: Performed by: INTERNAL MEDICINE

## 2023-03-13 PROCEDURE — 96374 THER/PROPH/DIAG INJ IV PUSH: CPT

## 2023-03-13 RX ORDER — HEPARIN SODIUM (PORCINE) LOCK FLUSH IV SOLN 100 UNIT/ML 100 UNIT/ML
500 SOLUTION INTRAVENOUS PRN
Status: CANCELLED | OUTPATIENT
Start: 2023-03-20

## 2023-03-13 RX ORDER — SODIUM CHLORIDE 0.9 % (FLUSH) 0.9 %
5-40 SYRINGE (ML) INJECTION PRN
Status: CANCELLED | OUTPATIENT
Start: 2023-03-20

## 2023-03-13 RX ORDER — ONDANSETRON 2 MG/ML
8 INJECTION INTRAMUSCULAR; INTRAVENOUS
Status: CANCELLED | OUTPATIENT
Start: 2023-03-20

## 2023-03-13 RX ORDER — SODIUM CHLORIDE 9 MG/ML
5-250 INJECTION, SOLUTION INTRAVENOUS PRN
Status: CANCELLED | OUTPATIENT
Start: 2023-03-20

## 2023-03-13 RX ORDER — EPINEPHRINE 1 MG/ML
0.3 INJECTION, SOLUTION, CONCENTRATE INTRAVENOUS PRN
Status: CANCELLED | OUTPATIENT
Start: 2023-03-20

## 2023-03-13 RX ORDER — FAMOTIDINE 10 MG/ML
20 INJECTION, SOLUTION INTRAVENOUS
Status: CANCELLED | OUTPATIENT
Start: 2023-03-20

## 2023-03-13 RX ORDER — ALBUTEROL SULFATE 90 UG/1
4 AEROSOL, METERED RESPIRATORY (INHALATION) PRN
Status: CANCELLED | OUTPATIENT
Start: 2023-03-20

## 2023-03-13 RX ORDER — SODIUM CHLORIDE 0.9 % (FLUSH) 0.9 %
5-40 SYRINGE (ML) INJECTION PRN
Status: DISCONTINUED | OUTPATIENT
Start: 2023-03-13 | End: 2023-03-14 | Stop reason: HOSPADM

## 2023-03-13 RX ORDER — SODIUM CHLORIDE 9 MG/ML
INJECTION, SOLUTION INTRAVENOUS CONTINUOUS
Status: CANCELLED | OUTPATIENT
Start: 2023-03-20

## 2023-03-13 RX ORDER — DIPHENHYDRAMINE HYDROCHLORIDE 50 MG/ML
50 INJECTION INTRAMUSCULAR; INTRAVENOUS
Status: CANCELLED | OUTPATIENT
Start: 2023-03-20

## 2023-03-13 RX ORDER — ACETAMINOPHEN 325 MG/1
650 TABLET ORAL
Status: CANCELLED | OUTPATIENT
Start: 2023-03-20

## 2023-03-13 RX ADMIN — IRON SUCROSE 200 MG: 20 INJECTION, SOLUTION INTRAVENOUS at 13:24

## 2023-03-13 RX ADMIN — SODIUM CHLORIDE, PRESERVATIVE FREE 10 ML: 5 INJECTION INTRAVENOUS at 13:24

## 2023-03-13 NOTE — PLAN OF CARE
Problem: Safety - Adult  Goal: Free from fall injury  Outcome: Adequate for Discharge
left sided rib pain, no back pain, no gout, no musculoskeletal pain, no neck pain, and no weakness.

## 2023-03-14 NOTE — PROGRESS NOTES
Patient states they received their colon prep instructions and home medications that are to be taken on the day of their procedure with a small sip of water only, from the physician's office. Pt states she was instructed to stop taking xarelto 3 days prior to surgery per Dr. Brayan Heard office. Instructed pt to take zoloft with a small sip of water prior to arriving to the hospital the day of surgery.

## 2023-03-19 PROBLEM — R79.89 ELEVATED TROPONIN: Status: RESOLVED | Noted: 2023-02-17 | Resolved: 2023-03-19

## 2023-03-19 PROBLEM — R77.8 ELEVATED TROPONIN: Status: RESOLVED | Noted: 2023-02-17 | Resolved: 2023-03-19

## 2023-03-21 ENCOUNTER — HOSPITAL ENCOUNTER (OUTPATIENT)
Dept: INFUSION THERAPY | Age: 78
Discharge: HOME | End: 2023-03-21
Payer: MEDICARE

## 2023-03-21 VITALS
TEMPERATURE: 97.2 F | RESPIRATION RATE: 18 BRPM | SYSTOLIC BLOOD PRESSURE: 127 MMHG | HEART RATE: 50 BPM | DIASTOLIC BLOOD PRESSURE: 66 MMHG

## 2023-03-21 DIAGNOSIS — Z78.9 DRUG INTOLERANCE: Primary | ICD-10-CM

## 2023-03-21 DIAGNOSIS — D50.0 IRON DEFICIENCY ANEMIA DUE TO CHRONIC BLOOD LOSS: ICD-10-CM

## 2023-03-21 PROCEDURE — 96372 THER/PROPH/DIAG INJ SC/IM: CPT

## 2023-03-21 PROCEDURE — 2580000003 HC RX 258: Performed by: INTERNAL MEDICINE

## 2023-03-21 PROCEDURE — 6360000002 HC RX W HCPCS: Performed by: INTERNAL MEDICINE

## 2023-03-21 PROCEDURE — 96374 THER/PROPH/DIAG INJ IV PUSH: CPT

## 2023-03-21 RX ORDER — HEPARIN SODIUM (PORCINE) LOCK FLUSH IV SOLN 100 UNIT/ML 100 UNIT/ML
500 SOLUTION INTRAVENOUS PRN
Status: CANCELLED | OUTPATIENT
Start: 2023-03-27

## 2023-03-21 RX ORDER — FAMOTIDINE 10 MG/ML
20 INJECTION, SOLUTION INTRAVENOUS
Status: CANCELLED | OUTPATIENT
Start: 2023-03-27

## 2023-03-21 RX ORDER — SODIUM CHLORIDE 0.9 % (FLUSH) 0.9 %
5-40 SYRINGE (ML) INJECTION PRN
Status: CANCELLED | OUTPATIENT
Start: 2023-03-27

## 2023-03-21 RX ORDER — DIPHENHYDRAMINE HYDROCHLORIDE 50 MG/ML
50 INJECTION INTRAMUSCULAR; INTRAVENOUS
Status: CANCELLED | OUTPATIENT
Start: 2023-03-27

## 2023-03-21 RX ORDER — SODIUM CHLORIDE 9 MG/ML
INJECTION, SOLUTION INTRAVENOUS CONTINUOUS
Status: CANCELLED | OUTPATIENT
Start: 2023-03-27

## 2023-03-21 RX ORDER — SODIUM CHLORIDE 9 MG/ML
INJECTION, SOLUTION INTRAVENOUS CONTINUOUS
Status: DISCONTINUED | OUTPATIENT
Start: 2023-03-21 | End: 2023-03-21

## 2023-03-21 RX ORDER — ALBUTEROL SULFATE 90 UG/1
4 AEROSOL, METERED RESPIRATORY (INHALATION) PRN
Status: CANCELLED | OUTPATIENT
Start: 2023-03-27

## 2023-03-21 RX ORDER — EPINEPHRINE 1 MG/ML
0.3 INJECTION, SOLUTION, CONCENTRATE INTRAVENOUS PRN
Status: CANCELLED | OUTPATIENT
Start: 2023-03-27

## 2023-03-21 RX ORDER — SODIUM CHLORIDE 0.9 % (FLUSH) 0.9 %
5-40 SYRINGE (ML) INJECTION PRN
Status: DISCONTINUED | OUTPATIENT
Start: 2023-03-21 | End: 2023-03-21

## 2023-03-21 RX ORDER — SODIUM CHLORIDE 9 MG/ML
5-250 INJECTION, SOLUTION INTRAVENOUS PRN
Status: CANCELLED | OUTPATIENT
Start: 2023-03-27

## 2023-03-21 RX ORDER — ONDANSETRON 2 MG/ML
8 INJECTION INTRAMUSCULAR; INTRAVENOUS
Status: CANCELLED | OUTPATIENT
Start: 2023-03-27

## 2023-03-21 RX ORDER — ACETAMINOPHEN 325 MG/1
650 TABLET ORAL
Status: CANCELLED | OUTPATIENT
Start: 2023-03-27

## 2023-03-21 RX ADMIN — SODIUM CHLORIDE, PRESERVATIVE FREE 10 ML: 5 INJECTION INTRAVENOUS at 13:30

## 2023-03-21 RX ADMIN — IRON SUCROSE 200 MG: 20 INJECTION, SOLUTION INTRAVENOUS at 13:32

## 2023-03-27 ENCOUNTER — ANESTHESIA EVENT (OUTPATIENT)
Dept: OPERATING ROOM | Age: 78
End: 2023-03-27
Payer: MEDICARE

## 2023-03-28 ENCOUNTER — HOSPITAL ENCOUNTER (OUTPATIENT)
Age: 78
Setting detail: OUTPATIENT SURGERY
Discharge: HOME | End: 2023-03-28
Attending: INTERNAL MEDICINE | Admitting: INTERNAL MEDICINE
Payer: MEDICARE

## 2023-03-28 ENCOUNTER — ANESTHESIA (OUTPATIENT)
Dept: OPERATING ROOM | Age: 78
End: 2023-03-28
Payer: MEDICARE

## 2023-03-28 VITALS
HEART RATE: 74 BPM | OXYGEN SATURATION: 92 % | RESPIRATION RATE: 18 BRPM | HEIGHT: 65 IN | BODY MASS INDEX: 35.16 KG/M2 | TEMPERATURE: 97.4 F | WEIGHT: 211 LBS | DIASTOLIC BLOOD PRESSURE: 50 MMHG | SYSTOLIC BLOOD PRESSURE: 132 MMHG

## 2023-03-28 PROBLEM — K64.8 INTERNAL AND EXTERNAL HEMORRHOIDS WITHOUT COMPLICATION: Status: ACTIVE | Noted: 2023-03-28

## 2023-03-28 PROBLEM — K57.30 DIVERTICULOSIS OF COLON: Status: ACTIVE | Noted: 2023-03-28

## 2023-03-28 PROBLEM — K64.4 INTERNAL AND EXTERNAL HEMORRHOIDS WITHOUT COMPLICATION: Status: ACTIVE | Noted: 2023-03-28

## 2023-03-28 PROBLEM — K31.811 GASTRIC HEMORRHAGE DUE TO GASTRIC ANTRAL VASCULAR ECTASIA (GAVE): Status: ACTIVE | Noted: 2023-03-28

## 2023-03-28 PROCEDURE — 3609027000 HC COLONOSCOPY: Performed by: INTERNAL MEDICINE

## 2023-03-28 PROCEDURE — 3609017100 HC EGD: Performed by: INTERNAL MEDICINE

## 2023-03-28 PROCEDURE — 6360000002 HC RX W HCPCS: Performed by: NURSE ANESTHETIST, CERTIFIED REGISTERED

## 2023-03-28 PROCEDURE — 3700000000 HC ANESTHESIA ATTENDED CARE: Performed by: INTERNAL MEDICINE

## 2023-03-28 PROCEDURE — 2500000003 HC RX 250 WO HCPCS: Performed by: NURSE ANESTHETIST, CERTIFIED REGISTERED

## 2023-03-28 PROCEDURE — 45378 DIAGNOSTIC COLONOSCOPY: CPT | Performed by: INTERNAL MEDICINE

## 2023-03-28 PROCEDURE — 43255 EGD CONTROL BLEEDING ANY: CPT | Performed by: INTERNAL MEDICINE

## 2023-03-28 PROCEDURE — C1889 IMPLANT/INSERT DEVICE, NOC: HCPCS | Performed by: INTERNAL MEDICINE

## 2023-03-28 PROCEDURE — 2720000010 HC SURG SUPPLY STERILE: Performed by: INTERNAL MEDICINE

## 2023-03-28 PROCEDURE — C1052 HEMOSTATIC AGENT, GI, TOPIC: HCPCS | Performed by: INTERNAL MEDICINE

## 2023-03-28 PROCEDURE — 2580000003 HC RX 258: Performed by: NURSE ANESTHETIST, CERTIFIED REGISTERED

## 2023-03-28 PROCEDURE — 7100000011 HC PHASE II RECOVERY - ADDTL 15 MIN: Performed by: INTERNAL MEDICINE

## 2023-03-28 PROCEDURE — 2709999900 HC NON-CHARGEABLE SUPPLY: Performed by: INTERNAL MEDICINE

## 2023-03-28 PROCEDURE — 3700000001 HC ADD 15 MINUTES (ANESTHESIA): Performed by: INTERNAL MEDICINE

## 2023-03-28 PROCEDURE — 7100000010 HC PHASE II RECOVERY - FIRST 15 MIN: Performed by: INTERNAL MEDICINE

## 2023-03-28 RX ORDER — LIDOCAINE HYDROCHLORIDE 20 MG/ML
INJECTION, SOLUTION EPIDURAL; INFILTRATION; INTRACAUDAL; PERINEURAL PRN
Status: DISCONTINUED | OUTPATIENT
Start: 2023-03-28 | End: 2023-03-28 | Stop reason: SDUPTHER

## 2023-03-28 RX ORDER — SODIUM CHLORIDE, SODIUM LACTATE, POTASSIUM CHLORIDE, CALCIUM CHLORIDE 600; 310; 30; 20 MG/100ML; MG/100ML; MG/100ML; MG/100ML
INJECTION, SOLUTION INTRAVENOUS ONCE
Status: COMPLETED | OUTPATIENT
Start: 2023-03-28 | End: 2023-03-28

## 2023-03-28 RX ORDER — SODIUM CHLORIDE 9 MG/ML
INJECTION, SOLUTION INTRAVENOUS PRN
Status: CANCELLED | OUTPATIENT
Start: 2023-03-28

## 2023-03-28 RX ORDER — SODIUM CHLORIDE 0.9 % (FLUSH) 0.9 %
5-40 SYRINGE (ML) INJECTION EVERY 12 HOURS SCHEDULED
Status: CANCELLED | OUTPATIENT
Start: 2023-03-28

## 2023-03-28 RX ORDER — PROPOFOL 10 MG/ML
INJECTION, EMULSION INTRAVENOUS PRN
Status: DISCONTINUED | OUTPATIENT
Start: 2023-03-28 | End: 2023-03-28 | Stop reason: SDUPTHER

## 2023-03-28 RX ORDER — ACETAMINOPHEN 325 MG/1
650 TABLET ORAL ONCE
Status: DISCONTINUED | OUTPATIENT
Start: 2023-03-28 | End: 2023-03-28 | Stop reason: HOSPADM

## 2023-03-28 RX ORDER — DIMENHYDRINATE 50 MG/1
50 TABLET ORAL ONCE
Status: DISCONTINUED | OUTPATIENT
Start: 2023-03-28 | End: 2023-03-28 | Stop reason: HOSPADM

## 2023-03-28 RX ORDER — SODIUM CHLORIDE 0.9 % (FLUSH) 0.9 %
5-40 SYRINGE (ML) INJECTION PRN
Status: CANCELLED | OUTPATIENT
Start: 2023-03-28

## 2023-03-28 RX ORDER — SODIUM CHLORIDE, SODIUM LACTATE, POTASSIUM CHLORIDE, CALCIUM CHLORIDE 600; 310; 30; 20 MG/100ML; MG/100ML; MG/100ML; MG/100ML
INJECTION, SOLUTION INTRAVENOUS CONTINUOUS PRN
Status: DISCONTINUED | OUTPATIENT
Start: 2023-03-28 | End: 2023-03-28 | Stop reason: SDUPTHER

## 2023-03-28 RX ADMIN — PROPOFOL 185 MCG/KG/MIN: 10 INJECTION, EMULSION INTRAVENOUS at 10:09

## 2023-03-28 RX ADMIN — PROPOFOL 25 MG: 10 INJECTION, EMULSION INTRAVENOUS at 10:04

## 2023-03-28 RX ADMIN — PROPOFOL 25 MG: 10 INJECTION, EMULSION INTRAVENOUS at 10:07

## 2023-03-28 RX ADMIN — LIDOCAINE HYDROCHLORIDE 5 ML: 20 INJECTION, SOLUTION EPIDURAL; INFILTRATION; INTRACAUDAL; PERINEURAL at 10:02

## 2023-03-28 RX ADMIN — LIDOCAINE HYDROCHLORIDE 5 ML: 20 INJECTION, SOLUTION EPIDURAL; INFILTRATION; INTRACAUDAL; PERINEURAL at 10:11

## 2023-03-28 RX ADMIN — SODIUM CHLORIDE, POTASSIUM CHLORIDE, SODIUM LACTATE AND CALCIUM CHLORIDE: 600; 310; 30; 20 INJECTION, SOLUTION INTRAVENOUS at 09:59

## 2023-03-28 RX ADMIN — SODIUM CHLORIDE, POTASSIUM CHLORIDE, SODIUM LACTATE AND CALCIUM CHLORIDE: 600; 310; 30; 20 INJECTION, SOLUTION INTRAVENOUS at 09:34

## 2023-03-28 ASSESSMENT — PAIN - FUNCTIONAL ASSESSMENT: PAIN_FUNCTIONAL_ASSESSMENT: NONE - DENIES PAIN

## 2023-03-28 NOTE — OP NOTE
performed in the rectum and one large non-bleeding internal hemorrhoid were noted. Withdrawal time was 7 minutes. IMPRESSION:   Diverticulosis  Internal hemorrhoid     RECOMMENDATIONS:   1) Follow up with referring provider, as previously scheduled. 2) Repeat Colonoscopy for screening colonoscopy not indicated. Electronically signed by Pat Santiago MD on 3/28/2023 at 11:17 AM     The patient was counseled at length about the risks of tracy Covid-19 during their perioperative period and any recovery window from their procedure. The patient was made aware that tracy Covid-19  may worsen their prognosis for recovering from their procedure  and lend to a higher morbidity and/or mortality risk. All material risks, benefits, and reasonable alternatives including postponing the procedure were discussed. The patient DOES wish to proceed with the procedure at this time.

## 2023-03-28 NOTE — OP NOTE
and second portion of the duodenum. The endoscopic exam showed no ulcers or lesions from bulb to sweep to 3rd portion. Scope was withdrawn back into the stomach and a 7 Fr Gold probe was inserted into the scope and used to cauterize the oozing linear erythematous lesions. One clip was placed at one persistently oozing site. The gold probe was removed and a hemospray catheter was inserted into the scope under direct visualization and the hemospray applied on the antral lesions as well as in the gastric body where scattered erythematous lesions were noted. IMPRESSION:  Gastric Antral Vascular Ectasia       RECOMMENDATIONS:   1) Avoid NSAIDs. These include medications like ibuprofen, Aleve, Motrin, Naprosyn, aspirin 325 mg. Alternative is acetaminophen no more than 2400 mg daily. Recommend omeprazole 20 mg daily for 30 days    2) Repeat EGD if there is persistent anemia in 3-6 months. Electronically signed by Mary Cabrera MD on 3/28/2023 at 11:01 AM      this note is created with the assistance of a speech recognition program.  While intending to generate a document that actually reflects the content of the visit, the document can still have some errors including those of syntax and sound a like substitutions which may escape proof reading. It such instances, actual meaning can be extrapolated by contextual diversion. The patient was counseled at length about the risks of tracy Covid-19 during their perioperative period and any recovery window from their procedure. The patient was made aware that tracy Covid-19  may worsen their prognosis for recovering from their procedure  and lend to a higher morbidity and/or mortality risk. All material risks, benefits, and reasonable alternatives including postponing the procedure were discussed. The patient DOES wish to proceed with the procedure at this time.

## 2023-03-28 NOTE — PROGRESS NOTES
Pt used restroom upon arrival to preop, ambulates very slowly but steady, changed into hospital gown in restroom. Pt's slow ambulation caused delay in ability to get pt ready quickly.

## 2023-03-28 NOTE — ANESTHESIA PRE PROCEDURE
[id number], [id number], BEART, [id number]     Type & Screen (If Applicable):  No results found for: LABABO, LABRH    Drug/Infectious Status (If Applicable):  No results found for: HIV, HEPCAB    COVID-19 Screening (If Applicable): No results found for: COVID19        Anesthesia Evaluation  Patient summary reviewed and Nursing notes reviewed no history of anesthetic complications:   Airway: Mallampati: I  TM distance: >3 FB   Neck ROM: full  Mouth opening: > = 3 FB   Dental: normal exam         Pulmonary:normal exam    (+) sleep apnea: on CPAP,                             Cardiovascular:    (+) hypertension: mild,       ECG reviewed      Echocardiogram reviewed               ROS comment: cardiac cath 2/2023  - no disease  Echo 65% EF, no significant findings     Neuro/Psych:   Negative Neuro/Psych ROS              GI/Hepatic/Renal:   (+) renal disease: CRI,           Endo/Other:    (+) blood dyscrasia: anemia, arthritis: OA., . Pt had PAT visit. ROS comment: \"frozen shoulders\" bilaterally  OK- receiving iron infusions, LT 1 week ago Abdominal:   (+) obese,           Vascular:   + DVT, PE.        ROS comment: 12/16/2022, last DVT with PE bilaterally. Other Findings:           Anesthesia Plan      general and TIVA     ASA 3       Induction: intravenous. Anesthetic plan and risks discussed with patient.                         GRIS Verma - CRNA   3/28/2023

## 2023-03-28 NOTE — H&P
History and Physical    Patient's Name/Date of Birth: Norberto Noss / 1945 (70 y.o.)    MRN: [id number]     Date: March 28, 2023       CHIEF COMPLAINT:  iron deficiency anemia      HPI  Geoffrey Valera is a 76year old woman with a past medical history of pulmonary embolism, deep vein thrombosis, arthritis who presents for iron deficiency anemia. She states that her stools turned dark when she started taking iron pills - they are normal at this time. OSH 08/13/2019  EGD  Hiatal hernia  Inflamed esophagus     Colonoscopy  Hemorrhoids  Diverticula        Family history of colon cancer: She believes her father may have had a colon resection from colon cancer. Blood in stool: Currently, no.  Endorses history of hemorrhoids  Unintentional weight loss: No  Abdominal pain: No  Prior colonoscopy: Yes - 08/2019    Past Medical History:   Diagnosis Date    Arthritis     Blood clot in vein 04/2022    on Xarelto for two months per patient    Brain bleed ([facility] [address] 1983    Cataract     DVT (deep venous thrombosis) ([facility] [address]     History of hip replacement     History of total bilateral knee replacement     Hypertension     S/P cardiac cath 02/17/2023    [facility]) [place]/Dr. Montiel/Right Radial    Sleep apnea      Past Surgical History:   Procedure Laterality Date    COLONOSCOPY  08/13/2019    JOINT REPLACEMENT Bilateral     knees and hips     Current Facility-Administered Medications   Medication Dose Route Frequency Provider Last Rate Last Admin    acetaminophen (TYLENOL) tablet 650 mg  650 mg Oral Once Juliann Mares APRN - ELYSE        dimenhyDRINATE (DRAMAMINE) tablet 50 mg  50 mg Oral Once GRIS Vail - CRNA         Allergies   Allergen Reactions    Sulfamethoxazole-Trimethoprim Rash    Aspirin Other (See Comments)     H/O brain bleed in 1983; told to never take aspirin products      Iron Nausea Only     OTC    Sulfa Antibiotics Rash     Family History   Problem Relation Age of Onset    Deep Vein Thrombosis Mother     Colon

## 2023-03-28 NOTE — DISCHARGE INSTRUCTIONS
Clips were placed in your colon as part of the polyp removal process; these clips are metal and can interfere with MRI procedures; the clips will pass as part of a bowel movement after several weeks until that time you have been given an implant card stating when and where the clips are located; please present the card to radiology prior to having any MRI procedures. RECOMMENDATIONS:   1) Follow up with referring provider, as previously scheduled. 2) Repeat Colonoscopy for screening colonoscopy not indicated. If a biopsy or polypectomy was done, call for results in two weeks if you have not been contacted by the GI clinic staff. It's normal to have a feeling of fullness or mild cramping in your abdomen afterwards due to air that is put into your bowel during the procedure. Mild activities such as walking will help you pass the air. You may resume your regular diet. CALL THE DOCTOR IF YOU HAVE:     Chest pain or trouble breathing. A hoarse voice or trouble swallowing    Bleeding from your rectum, vomiting or spitting up of blood that is more than a few streaks or red or black stools    A fever above 101F or if you have chills    Pain that is worse or different than any pain you had before the procedure    Nausea or vomiting that lasts for more then 2 hours. If symptoms are to severe call 911 or go to the nearest Emergency Room.

## 2023-03-28 NOTE — ANESTHESIA POSTPROCEDURE EVALUATION
Department of Anesthesiology  Postprocedure Note    Patient: Tony Sesay  MRN: [id number]  YOB: 1945  Date of evaluation: 3/28/2023      Procedure Summary     Date: 03/28/23 Room / Location: [address] [place] [address]    Anesthesia Start: 4945 Anesthesia Stop: 1059    Procedures:       COLORECTAL CANCER SCREENING, NOT HIGH RISK      EGD ESOPHAGOGASTRODUODENOSCOPY Diagnosis:       Iron deficiency anemia, unspecified iron deficiency anemia type      (D50.9)    Surgeons: Tara Quiroz MD Responsible Provider: GRIS Lopes CRNA    Anesthesia Type: general, TIVA ASA Status: 3          Anesthesia Type: No value filed.     Katiana Phase I: Katiana Score: 10    Katiana Phase II: Katiana Score: 10      Anesthesia Post Evaluation    Patient location during evaluation: PACU  Patient participation: complete - patient participated  Level of consciousness: awake and alert  Pain score: 0  Airway patency: patent  Nausea & Vomiting: no nausea and no vomiting  Complications: no  Cardiovascular status: blood pressure returned to baseline  Respiratory status: acceptable and room air  Hydration status: stable

## 2023-03-28 NOTE — PROGRESS NOTES
Dr. Arnell Goltz in to review procedure with patient and family. Patient states ready to be discharged at this time. Discharge instructions given, all verbalize understanding,deny any questions and/or concerns. Pt transfer off unit in wheelchair w/ all belongings. Discharge Criteria    Inpatients must meet Criteria 1 through 7. All other patients are either YES or N/A. If a NO is chosen then Anesthesia or Surgeon must be notified. 1.  Minimum 30 minutes after last dose of sedative medication, minimum 120 minutes after last dose of reversal agent. Yes      2. Systolic BP stable within 20 mmHg for 30 minutes & systolic BP between 90 & 056 or within 10 mmHg of baseline. Yes      3. Pulse between 60 and 100 or within 10 bpm of baseline. Yes      4. Spontaneous respiratory rate >/= 10 per minute. Yes      5. SaO2 >/= 95 or  >/= baseline. Yes      6. Able to cough and swallow or return to baseline function. Yes      7. Alert and oriented or return to baseline mental status. Yes      8. Demonstrates controlled, coordinated movements, ambulates with steady gait, or return to baseline activity function. Yes      9. Minimal or no pain or nausea, or at a level tolerable and acceptable to patient. Yes      10. Takes and retains oral fluids as allowed. Yes      11. Procedural / perioperative site stable. Minimal or no bleeding. Yes          12. If GI endoscopy procedure, minimal or no abdominal distention or passing flatus. Yes      13. Written discharge instructions and emergency telephone number provided. Yes      14. Accompanied by a responsible adult.     Yes

## 2023-03-30 ENCOUNTER — OFFICE VISIT (OUTPATIENT)
Dept: PULMONOLOGY | Age: 78
End: 2023-03-30
Payer: MEDICARE

## 2023-03-30 VITALS
TEMPERATURE: 96.1 F | DIASTOLIC BLOOD PRESSURE: 74 MMHG | RESPIRATION RATE: 16 BRPM | SYSTOLIC BLOOD PRESSURE: 113 MMHG | WEIGHT: 215.3 LBS | OXYGEN SATURATION: 95 % | HEART RATE: 61 BPM | BODY MASS INDEX: 35.87 KG/M2 | HEIGHT: 65 IN

## 2023-03-30 DIAGNOSIS — E66.9 OBESITY (BMI 35.0-39.9 WITHOUT COMORBIDITY): ICD-10-CM

## 2023-03-30 DIAGNOSIS — I26.99 OTHER ACUTE PULMONARY EMBOLISM WITHOUT ACUTE COR PULMONALE (HCC): Primary | ICD-10-CM

## 2023-03-30 DIAGNOSIS — G47.33 OSA TREATED WITH BIPAP: ICD-10-CM

## 2023-03-30 DIAGNOSIS — Z86.718 HISTORY OF DVT OF LOWER EXTREMITY: ICD-10-CM

## 2023-03-30 PROCEDURE — 3074F SYST BP LT 130 MM HG: CPT | Performed by: INTERNAL MEDICINE

## 2023-03-30 PROCEDURE — 3078F DIAST BP <80 MM HG: CPT | Performed by: INTERNAL MEDICINE

## 2023-03-30 PROCEDURE — 99214 OFFICE O/P EST MOD 30 MIN: CPT | Performed by: INTERNAL MEDICINE

## 2023-03-30 PROCEDURE — 1123F ACP DISCUSS/DSCN MKR DOCD: CPT | Performed by: INTERNAL MEDICINE

## 2023-03-30 NOTE — PATIENT INSTRUCTIONS
SURVEY:    You may be receiving a survey from [employer] regarding your visit today. Please complete the survey to enable us to provide the highest quality of care to you and your family. If you cannot score us a very good on any question, please call the office to discuss how we could have made your experience a very good one. Thank you.

## 2023-03-30 NOTE — PROGRESS NOTES
OUTPATIENT PULMONARY PROGRESS NOTE      Patient:  April Verdin  MRN: [id number]    Consulting Physician: Andrez Fortune DO  Reason for Consult: Pulm embolism/recurrent DVT/obstructive sleep apnea  [facility]acy Care Physician: Andrez Fortune DO    HISTORY OF PRESENT ILLNESS:     She is here for follow-up of history of DVT/pulm embolism/obstructive sleep apnea. Since she was seen last time a lot happened according to patient. She had GI work-up done for iron deficiency anemia. She was referred to hematology for history of thromboembolism and anemia and also liver mass. She had iron infusion done by hematology which she had finished at this time. She was seen by GI and had endoscopy and colonoscopy done. She was found to have no significant finding on colonoscopy but vascular ectasia on endoscopy and had cauterization done. She also had liver MRI done and found to have scattered liver cyst.  She also have renal work-up done and found to have renal cyst.  She was admitted to hospital in February with chest pain and had a cardiac catheterization done which did not show significant coronary artery disease and was seen by cardiology advised to risk stratification. She is taking Xarelto once daily and she was advised that she should continue with lifelong anticoagulation therapy  According to patient she is compliant with BiPAP she use BiPAP every night denies any problem with BiPAP use full mask with heated modification. When she wake up in the morning she feels fresh she does not usually take nap during the daytime she does not doze off during the daytime. She has high BiPAP setting but denies significant problem. Compliance data is not available although it was requested. She does not complain of shortness of breath denies dizziness denies pleuritic pain. She does have chronic edema of her legs which has been the same without much change she denies pain in her legs.   She denies change in her chronic orthopnea

## 2023-05-09 RX ORDER — OXYBUTYNIN CHLORIDE 15 MG/1
15 TABLET, EXTENDED RELEASE ORAL DAILY
Qty: 30 TABLET | Refills: 11 | OUTPATIENT
Start: 2023-05-09

## 2023-05-17 LAB
ABSOLUTE BASO #: 0.02 K/UL (ref 0–0.2)
ABSOLUTE EOS #: 0.18 K/UL (ref 0–0.5)
ABSOLUTE LYMPH #: 0.74 K/UL (ref 1–4)
ABSOLUTE MONO #: 0.42 K/UL (ref 0.2–1)
ABSOLUTE NEUT #: 3.57 K/UL (ref 1.5–7.5)
BASOPHILS RELATIVE PERCENT: 0.4 %
EOSINOPHILS RELATIVE PERCENT: 3.6 %
HCT VFR BLD CALC: 29.2 % (ref 34–45)
HEMOGLOBIN: 8.5 G/DL (ref 11.5–15.5)
LYMPHOCYTE %: 15 %
MCH RBC QN AUTO: 22.3 PG (ref 25–33)
MCHC RBC AUTO-ENTMCNC: 29.1 G/DL (ref 31–36)
MCV RBC AUTO: 76.4 FL (ref 80–99)
MONOCYTES # BLD: 8.5 %
NEUTROPHILS RELATIVE PERCENT: 72.3 %
PDW BLD-RTO: 16.2 % (ref 11.5–15)
PLATELETS: 308 K/UL (ref 130–400)
PMV BLD AUTO: 10 FL (ref 9.3–13)
RBC: 3.82 M/UL (ref 3.8–5.4)
RETIC: 1.6 % (ref 0.8–2.4)
WBC: 4.9 K/UL (ref 3.5–11)

## 2023-05-18 LAB
ALBUMIN SERPL-MCNC: 4.4 G/DL (ref 3.5–5.2)
ALK PHOSPHATASE: 59 U/L (ref 40–142)
ALT SERPL-CCNC: 12 U/L (ref 5–40)
ANION GAP SERPL CALCULATED.3IONS-SCNC: 11 MEQ/L (ref 7–16)
AST SERPL-CCNC: 19 U/L (ref 9–40)
BILIRUB SERPL-MCNC: 0.2 MG/DL
BUN BLDV-MCNC: 22 MG/DL (ref 8–23)
CALCIUM SERPL-MCNC: 10.6 MG/DL (ref 8.5–10.5)
CHLORIDE BLD-SCNC: 103 MEQ/L (ref 95–107)
CO2: 29 MEQ/L (ref 19–31)
CREAT SERPL-MCNC: 1.12 MG/DL (ref 0.6–1.3)
EGFR IF NONAFRICAN AMERICAN: 50 ML/MIN/1.73
FERRITIN: 10 NG/ML (ref 13–200)
FOLATE: 37.4 UG/L
GLUCOSE: 99 MG/DL (ref 70–99)
IRON SATURATION: 4 % (ref 20–50)
IRON, SERUM: 17 UG/DL (ref 37–145)
POTASSIUM SERPL-SCNC: 3.7 MEQ/L (ref 3.5–5.4)
SODIUM BLD-SCNC: 143 MEQ/L (ref 133–146)
TOTAL IRON BINDING CAPACITY: 404 UG/DL (ref 250–450)
TOTAL PROTEIN: 6.5 G/DL (ref 6.1–8.3)
UNSATURATED IRON BINDING CAPACITY: 387 UG/DL (ref 112–347)
VITAMIN B-12: 447 PG/ML (ref 200–950)

## 2023-05-22 ENCOUNTER — OFFICE VISIT (OUTPATIENT)
Dept: ONCOLOGY | Age: 78
End: 2023-05-22
Payer: MEDICARE

## 2023-05-22 VITALS
SYSTOLIC BLOOD PRESSURE: 127 MMHG | DIASTOLIC BLOOD PRESSURE: 72 MMHG | WEIGHT: 212 LBS | TEMPERATURE: 98.3 F | HEIGHT: 64 IN | HEART RATE: 56 BPM | BODY MASS INDEX: 36.19 KG/M2 | RESPIRATION RATE: 18 BRPM

## 2023-05-22 DIAGNOSIS — I26.99 BILATERAL PULMONARY EMBOLISM (HCC): ICD-10-CM

## 2023-05-22 DIAGNOSIS — K31.811 GASTRIC HEMORRHAGE DUE TO GASTRIC ANTRAL VASCULAR ECTASIA (GAVE): ICD-10-CM

## 2023-05-22 DIAGNOSIS — D50.0 IRON DEFICIENCY ANEMIA DUE TO CHRONIC BLOOD LOSS: ICD-10-CM

## 2023-05-22 DIAGNOSIS — N18.2 CHRONIC KIDNEY DISEASE, STAGE 2 (MILD): Primary | ICD-10-CM

## 2023-05-22 DIAGNOSIS — D50.0 BLOOD LOSS ANEMIA: ICD-10-CM

## 2023-05-22 PROBLEM — D50.9 IRON DEFICIENCY ANEMIA: Status: ACTIVE | Noted: 2023-05-22

## 2023-05-22 PROCEDURE — 3074F SYST BP LT 130 MM HG: CPT | Performed by: INTERNAL MEDICINE

## 2023-05-22 PROCEDURE — 99215 OFFICE O/P EST HI 40 MIN: CPT | Performed by: INTERNAL MEDICINE

## 2023-05-22 PROCEDURE — 1123F ACP DISCUSS/DSCN MKR DOCD: CPT | Performed by: INTERNAL MEDICINE

## 2023-05-22 PROCEDURE — 3078F DIAST BP <80 MM HG: CPT | Performed by: INTERNAL MEDICINE

## 2023-05-22 RX ORDER — ALBUTEROL SULFATE 90 UG/1
4 AEROSOL, METERED RESPIRATORY (INHALATION) PRN
Status: CANCELLED | OUTPATIENT
Start: 2023-05-29

## 2023-05-22 RX ORDER — ACETAMINOPHEN 325 MG/1
650 TABLET ORAL
Status: CANCELLED | OUTPATIENT
Start: 2023-05-29

## 2023-05-22 RX ORDER — FAMOTIDINE 10 MG/ML
20 INJECTION, SOLUTION INTRAVENOUS
Status: CANCELLED | OUTPATIENT
Start: 2023-05-29

## 2023-05-22 RX ORDER — SODIUM CHLORIDE 9 MG/ML
INJECTION, SOLUTION INTRAVENOUS CONTINUOUS
Status: CANCELLED | OUTPATIENT
Start: 2023-05-29

## 2023-05-22 RX ORDER — ONDANSETRON 2 MG/ML
8 INJECTION INTRAMUSCULAR; INTRAVENOUS
Status: CANCELLED | OUTPATIENT
Start: 2023-05-29

## 2023-05-22 RX ORDER — HEPARIN SODIUM (PORCINE) LOCK FLUSH IV SOLN 100 UNIT/ML 100 UNIT/ML
500 SOLUTION INTRAVENOUS PRN
Status: CANCELLED | OUTPATIENT
Start: 2023-05-29

## 2023-05-22 RX ORDER — SODIUM CHLORIDE 9 MG/ML
5-250 INJECTION, SOLUTION INTRAVENOUS PRN
Status: CANCELLED | OUTPATIENT
Start: 2023-05-29

## 2023-05-22 RX ORDER — SODIUM CHLORIDE 0.9 % (FLUSH) 0.9 %
5-40 SYRINGE (ML) INJECTION PRN
Status: CANCELLED | OUTPATIENT
Start: 2023-05-29

## 2023-05-22 RX ORDER — DIPHENHYDRAMINE HYDROCHLORIDE 50 MG/ML
50 INJECTION INTRAMUSCULAR; INTRAVENOUS
Status: CANCELLED | OUTPATIENT
Start: 2023-05-29

## 2023-05-22 RX ORDER — EPINEPHRINE 1 MG/ML
0.3 INJECTION, SOLUTION, CONCENTRATE INTRAVENOUS PRN
Status: CANCELLED | OUTPATIENT
Start: 2023-05-29

## 2023-05-22 NOTE — PROGRESS NOTES
anemia/blood loss anemia currently patient on anticoagulation; this is related to blood loss/GI source Gastric Antral Vascular Ectasia status post ablation> recommend repeat the endoscopy in 3 months and if no evidence of a bleed and still have iron deficiency anemia to repeat MRI of the liver and the kidney(hemorrhagic cyst)  Due to the weakness to proceed with a second IV iron   Reassess response to IV iron soon      Thank you for the consult          I spent a total of 40 minutes on the date of the service which included preparing to see the patient, face-to-face patient care, completing clinical documentation, obtaining and/or reviewing separately obtained history, performing a medically appropriate examination, counseling and educating the patient/family/caregiver and ordering medications, tests, or procedures. [facility]                            This note is created with the assistance of a speech recognition program.  While intending to generate a document that actually reflects the content of the visit, the document can still have some errors including those of syntax and sound a like substitutions which may escape proof reading. It such instances, actual meaning can be extrapolated by contextual diversion.

## 2023-05-23 RX ORDER — SODIUM CHLORIDE 0.9 % (FLUSH) 0.9 %
5-40 SYRINGE (ML) INJECTION PRN
OUTPATIENT
Start: 2023-05-23

## 2023-05-23 RX ORDER — ACETAMINOPHEN 325 MG/1
650 TABLET ORAL
OUTPATIENT
Start: 2023-05-23

## 2023-05-23 RX ORDER — ALBUTEROL SULFATE 90 UG/1
4 AEROSOL, METERED RESPIRATORY (INHALATION) PRN
OUTPATIENT
Start: 2023-05-23

## 2023-05-23 RX ORDER — SODIUM CHLORIDE 9 MG/ML
5-250 INJECTION, SOLUTION INTRAVENOUS PRN
OUTPATIENT
Start: 2023-05-23

## 2023-05-23 RX ORDER — DIPHENHYDRAMINE HYDROCHLORIDE 50 MG/ML
50 INJECTION INTRAMUSCULAR; INTRAVENOUS
OUTPATIENT
Start: 2023-05-23

## 2023-05-23 RX ORDER — EPINEPHRINE 1 MG/ML
0.3 INJECTION, SOLUTION, CONCENTRATE INTRAVENOUS PRN
OUTPATIENT
Start: 2023-05-23

## 2023-05-23 RX ORDER — HEPARIN SODIUM (PORCINE) LOCK FLUSH IV SOLN 100 UNIT/ML 100 UNIT/ML
500 SOLUTION INTRAVENOUS PRN
OUTPATIENT
Start: 2023-05-23

## 2023-05-23 RX ORDER — SODIUM CHLORIDE 9 MG/ML
INJECTION, SOLUTION INTRAVENOUS CONTINUOUS
OUTPATIENT
Start: 2023-05-23

## 2023-05-23 RX ORDER — ONDANSETRON 2 MG/ML
8 INJECTION INTRAMUSCULAR; INTRAVENOUS
OUTPATIENT
Start: 2023-05-23

## 2023-05-25 ENCOUNTER — HOSPITAL ENCOUNTER (OUTPATIENT)
Dept: WOMENS IMAGING | Age: 78
Discharge: HOME | End: 2023-05-27
Payer: MEDICARE

## 2023-05-25 DIAGNOSIS — Z12.31 BREAST CANCER SCREENING BY MAMMOGRAM: ICD-10-CM

## 2023-05-25 PROCEDURE — 77063 BREAST TOMOSYNTHESIS BI: CPT

## 2023-06-05 ENCOUNTER — HOSPITAL ENCOUNTER (OUTPATIENT)
Dept: INFUSION THERAPY | Age: 78
Discharge: HOME | End: 2023-06-05
Payer: MEDICARE

## 2023-06-05 VITALS
HEART RATE: 53 BPM | RESPIRATION RATE: 18 BRPM | SYSTOLIC BLOOD PRESSURE: 134 MMHG | TEMPERATURE: 96.8 F | DIASTOLIC BLOOD PRESSURE: 63 MMHG

## 2023-06-05 DIAGNOSIS — D50.0 IRON DEFICIENCY ANEMIA DUE TO CHRONIC BLOOD LOSS: Primary | ICD-10-CM

## 2023-06-05 PROCEDURE — 2580000003 HC RX 258: Performed by: INTERNAL MEDICINE

## 2023-06-05 PROCEDURE — 96374 THER/PROPH/DIAG INJ IV PUSH: CPT

## 2023-06-05 PROCEDURE — 6360000002 HC RX W HCPCS: Performed by: INTERNAL MEDICINE

## 2023-06-05 PROCEDURE — 96372 THER/PROPH/DIAG INJ SC/IM: CPT

## 2023-06-05 RX ORDER — FAMOTIDINE 10 MG/ML
20 INJECTION, SOLUTION INTRAVENOUS
OUTPATIENT
Start: 2023-06-05

## 2023-06-05 RX ORDER — SODIUM CHLORIDE 0.9 % (FLUSH) 0.9 %
5-40 SYRINGE (ML) INJECTION PRN
Status: DISCONTINUED | OUTPATIENT
Start: 2023-06-05 | End: 2023-06-06 | Stop reason: HOSPADM

## 2023-06-05 RX ORDER — DIPHENHYDRAMINE HYDROCHLORIDE 50 MG/ML
50 INJECTION INTRAMUSCULAR; INTRAVENOUS
OUTPATIENT
Start: 2023-06-05

## 2023-06-05 RX ORDER — SODIUM CHLORIDE 9 MG/ML
5-250 INJECTION, SOLUTION INTRAVENOUS PRN
OUTPATIENT
Start: 2023-06-05

## 2023-06-05 RX ORDER — ONDANSETRON 2 MG/ML
8 INJECTION INTRAMUSCULAR; INTRAVENOUS
OUTPATIENT
Start: 2023-06-05

## 2023-06-05 RX ORDER — ALBUTEROL SULFATE 90 UG/1
4 AEROSOL, METERED RESPIRATORY (INHALATION) PRN
OUTPATIENT
Start: 2023-06-05

## 2023-06-05 RX ORDER — EPINEPHRINE 1 MG/ML
0.3 INJECTION, SOLUTION, CONCENTRATE INTRAVENOUS PRN
OUTPATIENT
Start: 2023-06-05

## 2023-06-05 RX ORDER — HEPARIN SODIUM (PORCINE) LOCK FLUSH IV SOLN 100 UNIT/ML 100 UNIT/ML
500 SOLUTION INTRAVENOUS PRN
Status: CANCELLED | OUTPATIENT
Start: 2023-06-05

## 2023-06-05 RX ORDER — SODIUM CHLORIDE 9 MG/ML
INJECTION, SOLUTION INTRAVENOUS CONTINUOUS
OUTPATIENT
Start: 2023-06-05

## 2023-06-05 RX ORDER — SODIUM CHLORIDE 0.9 % (FLUSH) 0.9 %
5-40 SYRINGE (ML) INJECTION PRN
OUTPATIENT
Start: 2023-06-05

## 2023-06-05 RX ORDER — ACETAMINOPHEN 325 MG/1
650 TABLET ORAL
OUTPATIENT
Start: 2023-06-05

## 2023-06-05 RX ADMIN — IRON SUCROSE 200 MG: 20 INJECTION, SOLUTION INTRAVENOUS at 13:26

## 2023-06-05 RX ADMIN — SODIUM CHLORIDE, PRESERVATIVE FREE 10 ML: 5 INJECTION INTRAVENOUS at 13:25

## 2023-06-05 RX ADMIN — SODIUM CHLORIDE, PRESERVATIVE FREE 10 ML: 5 INJECTION INTRAVENOUS at 13:26

## 2023-06-05 ASSESSMENT — PAIN DESCRIPTION - LOCATION: LOCATION: GENERALIZED

## 2023-06-05 ASSESSMENT — PAIN SCALES - GENERAL: PAINLEVEL_OUTOF10: 6

## 2023-06-14 ENCOUNTER — HOSPITAL ENCOUNTER (OUTPATIENT)
Dept: INFUSION THERAPY | Age: 78
Discharge: HOME | End: 2023-06-14
Payer: MEDICARE

## 2023-06-14 VITALS
DIASTOLIC BLOOD PRESSURE: 57 MMHG | SYSTOLIC BLOOD PRESSURE: 120 MMHG | RESPIRATION RATE: 18 BRPM | HEART RATE: 53 BPM | TEMPERATURE: 98.5 F

## 2023-06-14 DIAGNOSIS — D50.0 IRON DEFICIENCY ANEMIA DUE TO CHRONIC BLOOD LOSS: Primary | ICD-10-CM

## 2023-06-14 PROCEDURE — 6360000002 HC RX W HCPCS: Performed by: INTERNAL MEDICINE

## 2023-06-14 PROCEDURE — 96374 THER/PROPH/DIAG INJ IV PUSH: CPT

## 2023-06-14 PROCEDURE — 2580000003 HC RX 258: Performed by: INTERNAL MEDICINE

## 2023-06-14 RX ORDER — HEPARIN SODIUM 100 [USP'U]/ML
500 INJECTION, SOLUTION INTRAVENOUS PRN
Status: CANCELLED | OUTPATIENT
Start: 2023-06-14

## 2023-06-14 RX ORDER — SODIUM CHLORIDE 9 MG/ML
5-250 INJECTION, SOLUTION INTRAVENOUS PRN
Status: CANCELLED | OUTPATIENT
Start: 2023-06-14

## 2023-06-14 RX ORDER — SODIUM CHLORIDE 0.9 % (FLUSH) 0.9 %
5-40 SYRINGE (ML) INJECTION PRN
Status: CANCELLED | OUTPATIENT
Start: 2023-06-14

## 2023-06-14 RX ORDER — ALBUTEROL SULFATE 90 UG/1
4 AEROSOL, METERED RESPIRATORY (INHALATION) PRN
Status: CANCELLED | OUTPATIENT
Start: 2023-06-14

## 2023-06-14 RX ORDER — ACETAMINOPHEN 325 MG/1
650 TABLET ORAL
Status: CANCELLED | OUTPATIENT
Start: 2023-06-14

## 2023-06-14 RX ORDER — DIPHENHYDRAMINE HYDROCHLORIDE 50 MG/ML
50 INJECTION INTRAMUSCULAR; INTRAVENOUS
Status: CANCELLED | OUTPATIENT
Start: 2023-06-14

## 2023-06-14 RX ORDER — SODIUM CHLORIDE 9 MG/ML
INJECTION, SOLUTION INTRAVENOUS CONTINUOUS
Status: CANCELLED | OUTPATIENT
Start: 2023-06-14

## 2023-06-14 RX ORDER — FAMOTIDINE 10 MG/ML
20 INJECTION, SOLUTION INTRAVENOUS
Status: CANCELLED | OUTPATIENT
Start: 2023-06-14

## 2023-06-14 RX ORDER — ONDANSETRON 2 MG/ML
8 INJECTION INTRAMUSCULAR; INTRAVENOUS
Status: CANCELLED | OUTPATIENT
Start: 2023-06-14

## 2023-06-14 RX ORDER — EPINEPHRINE 1 MG/ML
0.3 INJECTION, SOLUTION, CONCENTRATE INTRAVENOUS PRN
Status: CANCELLED | OUTPATIENT
Start: 2023-06-14

## 2023-06-14 RX ORDER — SODIUM CHLORIDE 0.9 % (FLUSH) 0.9 %
5-40 SYRINGE (ML) INJECTION PRN
Status: DISCONTINUED | OUTPATIENT
Start: 2023-06-14 | End: 2023-06-15 | Stop reason: HOSPADM

## 2023-06-14 RX ADMIN — SODIUM CHLORIDE, PRESERVATIVE FREE 10 ML: 5 INJECTION INTRAVENOUS at 13:30

## 2023-06-14 RX ADMIN — SODIUM CHLORIDE, PRESERVATIVE FREE 10 ML: 5 INJECTION INTRAVENOUS at 13:40

## 2023-06-14 RX ADMIN — IRON SUCROSE 200 MG: 20 INJECTION, SOLUTION INTRAVENOUS at 13:36

## 2023-06-21 ENCOUNTER — HOSPITAL ENCOUNTER (OUTPATIENT)
Dept: INFUSION THERAPY | Age: 78
Discharge: HOME | End: 2023-06-21
Payer: MEDICARE

## 2023-06-21 VITALS
TEMPERATURE: 97.7 F | DIASTOLIC BLOOD PRESSURE: 66 MMHG | SYSTOLIC BLOOD PRESSURE: 112 MMHG | RESPIRATION RATE: 18 BRPM | HEART RATE: 57 BPM

## 2023-06-21 DIAGNOSIS — D50.0 IRON DEFICIENCY ANEMIA DUE TO CHRONIC BLOOD LOSS: Primary | ICD-10-CM

## 2023-06-21 PROCEDURE — 6360000002 HC RX W HCPCS: Performed by: INTERNAL MEDICINE

## 2023-06-21 PROCEDURE — 96374 THER/PROPH/DIAG INJ IV PUSH: CPT

## 2023-06-21 PROCEDURE — 2580000003 HC RX 258: Performed by: INTERNAL MEDICINE

## 2023-06-21 RX ORDER — FAMOTIDINE 10 MG/ML
20 INJECTION, SOLUTION INTRAVENOUS
Status: CANCELLED | OUTPATIENT
Start: 2023-06-21

## 2023-06-21 RX ORDER — EPINEPHRINE 1 MG/ML
0.3 INJECTION, SOLUTION, CONCENTRATE INTRAVENOUS PRN
Status: CANCELLED | OUTPATIENT
Start: 2023-06-21

## 2023-06-21 RX ORDER — SODIUM CHLORIDE 9 MG/ML
5-250 INJECTION, SOLUTION INTRAVENOUS PRN
Status: CANCELLED | OUTPATIENT
Start: 2023-06-21

## 2023-06-21 RX ORDER — HEPARIN SODIUM 100 [USP'U]/ML
500 INJECTION, SOLUTION INTRAVENOUS PRN
Status: CANCELLED | OUTPATIENT
Start: 2023-06-21

## 2023-06-21 RX ORDER — SODIUM CHLORIDE 0.9 % (FLUSH) 0.9 %
5-40 SYRINGE (ML) INJECTION PRN
Status: CANCELLED | OUTPATIENT
Start: 2023-06-21

## 2023-06-21 RX ORDER — ONDANSETRON 2 MG/ML
8 INJECTION INTRAMUSCULAR; INTRAVENOUS
Status: CANCELLED | OUTPATIENT
Start: 2023-06-21

## 2023-06-21 RX ORDER — SODIUM CHLORIDE 0.9 % (FLUSH) 0.9 %
5-40 SYRINGE (ML) INJECTION PRN
Status: DISCONTINUED | OUTPATIENT
Start: 2023-06-21 | End: 2023-06-22 | Stop reason: HOSPADM

## 2023-06-21 RX ORDER — SODIUM CHLORIDE 9 MG/ML
INJECTION, SOLUTION INTRAVENOUS CONTINUOUS
Status: CANCELLED | OUTPATIENT
Start: 2023-06-21

## 2023-06-21 RX ORDER — ACETAMINOPHEN 325 MG/1
650 TABLET ORAL
Status: CANCELLED | OUTPATIENT
Start: 2023-06-21

## 2023-06-21 RX ORDER — ALBUTEROL SULFATE 90 UG/1
4 AEROSOL, METERED RESPIRATORY (INHALATION) PRN
Status: CANCELLED | OUTPATIENT
Start: 2023-06-21

## 2023-06-21 RX ORDER — DIPHENHYDRAMINE HYDROCHLORIDE 50 MG/ML
50 INJECTION INTRAMUSCULAR; INTRAVENOUS
Status: CANCELLED | OUTPATIENT
Start: 2023-06-21

## 2023-06-21 RX ADMIN — SODIUM CHLORIDE, PRESERVATIVE FREE 10 ML: 5 INJECTION INTRAVENOUS at 13:14

## 2023-06-21 RX ADMIN — SODIUM CHLORIDE, PRESERVATIVE FREE 10 ML: 5 INJECTION INTRAVENOUS at 13:33

## 2023-06-21 RX ADMIN — IRON SUCROSE 200 MG: 20 INJECTION, SOLUTION INTRAVENOUS at 13:33

## 2023-06-27 ENCOUNTER — HOSPITAL ENCOUNTER (OUTPATIENT)
Dept: INFUSION THERAPY | Age: 78
Discharge: HOME | End: 2023-06-27
Payer: MEDICARE

## 2023-06-27 VITALS
RESPIRATION RATE: 18 BRPM | DIASTOLIC BLOOD PRESSURE: 50 MMHG | HEART RATE: 61 BPM | TEMPERATURE: 97 F | SYSTOLIC BLOOD PRESSURE: 112 MMHG

## 2023-06-27 DIAGNOSIS — D50.0 IRON DEFICIENCY ANEMIA DUE TO CHRONIC BLOOD LOSS: Primary | ICD-10-CM

## 2023-06-27 PROCEDURE — 6360000002 HC RX W HCPCS: Performed by: INTERNAL MEDICINE

## 2023-06-27 PROCEDURE — 96365 THER/PROPH/DIAG IV INF INIT: CPT

## 2023-06-27 PROCEDURE — 2580000003 HC RX 258: Performed by: INTERNAL MEDICINE

## 2023-06-27 RX ORDER — SODIUM CHLORIDE 0.9 % (FLUSH) 0.9 %
5-40 SYRINGE (ML) INJECTION PRN
Status: CANCELLED | OUTPATIENT
Start: 2023-06-27

## 2023-06-27 RX ORDER — SODIUM CHLORIDE 9 MG/ML
INJECTION, SOLUTION INTRAVENOUS CONTINUOUS
Status: CANCELLED | OUTPATIENT
Start: 2023-06-27

## 2023-06-27 RX ORDER — SODIUM CHLORIDE 9 MG/ML
5-250 INJECTION, SOLUTION INTRAVENOUS PRN
Status: DISCONTINUED | OUTPATIENT
Start: 2023-06-27 | End: 2023-06-28 | Stop reason: HOSPADM

## 2023-06-27 RX ORDER — EPINEPHRINE 1 MG/ML
0.3 INJECTION, SOLUTION, CONCENTRATE INTRAVENOUS PRN
Status: CANCELLED | OUTPATIENT
Start: 2023-06-27

## 2023-06-27 RX ORDER — FAMOTIDINE 10 MG/ML
20 INJECTION, SOLUTION INTRAVENOUS
Status: CANCELLED | OUTPATIENT
Start: 2023-06-27

## 2023-06-27 RX ORDER — ONDANSETRON 2 MG/ML
8 INJECTION INTRAMUSCULAR; INTRAVENOUS
Status: CANCELLED | OUTPATIENT
Start: 2023-06-27

## 2023-06-27 RX ORDER — HEPARIN SODIUM 100 [USP'U]/ML
500 INJECTION, SOLUTION INTRAVENOUS PRN
Status: CANCELLED | OUTPATIENT
Start: 2023-06-27

## 2023-06-27 RX ORDER — SODIUM CHLORIDE 9 MG/ML
5-250 INJECTION, SOLUTION INTRAVENOUS PRN
Status: CANCELLED | OUTPATIENT
Start: 2023-06-27

## 2023-06-27 RX ORDER — DIPHENHYDRAMINE HYDROCHLORIDE 50 MG/ML
50 INJECTION INTRAMUSCULAR; INTRAVENOUS
Status: CANCELLED | OUTPATIENT
Start: 2023-06-27

## 2023-06-27 RX ORDER — ACETAMINOPHEN 325 MG/1
650 TABLET ORAL
Status: CANCELLED | OUTPATIENT
Start: 2023-06-27

## 2023-06-27 RX ORDER — SODIUM CHLORIDE 0.9 % (FLUSH) 0.9 %
5-40 SYRINGE (ML) INJECTION PRN
Status: DISCONTINUED | OUTPATIENT
Start: 2023-06-27 | End: 2023-06-28 | Stop reason: HOSPADM

## 2023-06-27 RX ORDER — ALBUTEROL SULFATE 90 UG/1
4 AEROSOL, METERED RESPIRATORY (INHALATION) PRN
Status: CANCELLED | OUTPATIENT
Start: 2023-06-27

## 2023-06-27 RX ADMIN — IRON SUCROSE 200 MG: 20 INJECTION, SOLUTION INTRAVENOUS at 13:24

## 2023-06-27 RX ADMIN — SODIUM CHLORIDE, PRESERVATIVE FREE 10 ML: 5 INJECTION INTRAVENOUS at 13:21

## 2023-07-06 ENCOUNTER — HOSPITAL ENCOUNTER (OUTPATIENT)
Dept: INFUSION THERAPY | Age: 78
Discharge: HOME | End: 2023-07-06
Payer: MEDICARE

## 2023-07-06 VITALS
TEMPERATURE: 98 F | SYSTOLIC BLOOD PRESSURE: 109 MMHG | RESPIRATION RATE: 18 BRPM | HEART RATE: 49 BPM | DIASTOLIC BLOOD PRESSURE: 58 MMHG

## 2023-07-06 DIAGNOSIS — D50.0 IRON DEFICIENCY ANEMIA DUE TO CHRONIC BLOOD LOSS: Primary | ICD-10-CM

## 2023-07-06 PROCEDURE — 96365 THER/PROPH/DIAG IV INF INIT: CPT

## 2023-07-06 PROCEDURE — 2580000003 HC RX 258: Performed by: INTERNAL MEDICINE

## 2023-07-06 PROCEDURE — 6360000002 HC RX W HCPCS: Performed by: INTERNAL MEDICINE

## 2023-07-06 RX ORDER — FAMOTIDINE 10 MG/ML
20 INJECTION, SOLUTION INTRAVENOUS
OUTPATIENT
Start: 2023-07-06

## 2023-07-06 RX ORDER — SODIUM CHLORIDE 9 MG/ML
5-250 INJECTION, SOLUTION INTRAVENOUS PRN
OUTPATIENT
Start: 2023-07-06

## 2023-07-06 RX ORDER — SODIUM CHLORIDE 9 MG/ML
5-250 INJECTION, SOLUTION INTRAVENOUS PRN
Status: DISCONTINUED | OUTPATIENT
Start: 2023-07-06 | End: 2023-07-07 | Stop reason: HOSPADM

## 2023-07-06 RX ORDER — SODIUM CHLORIDE 0.9 % (FLUSH) 0.9 %
5-40 SYRINGE (ML) INJECTION PRN
Status: DISCONTINUED | OUTPATIENT
Start: 2023-07-06 | End: 2023-07-07 | Stop reason: HOSPADM

## 2023-07-06 RX ORDER — SODIUM CHLORIDE 9 MG/ML
INJECTION, SOLUTION INTRAVENOUS CONTINUOUS
OUTPATIENT
Start: 2023-07-06

## 2023-07-06 RX ORDER — ALBUTEROL SULFATE 90 UG/1
4 AEROSOL, METERED RESPIRATORY (INHALATION) PRN
OUTPATIENT
Start: 2023-07-06

## 2023-07-06 RX ORDER — HEPARIN SODIUM 100 [USP'U]/ML
500 INJECTION, SOLUTION INTRAVENOUS PRN
OUTPATIENT
Start: 2023-07-06

## 2023-07-06 RX ORDER — DIPHENHYDRAMINE HYDROCHLORIDE 50 MG/ML
50 INJECTION INTRAMUSCULAR; INTRAVENOUS
OUTPATIENT
Start: 2023-07-06

## 2023-07-06 RX ORDER — EPINEPHRINE 1 MG/ML
0.3 INJECTION, SOLUTION, CONCENTRATE INTRAVENOUS PRN
OUTPATIENT
Start: 2023-07-06

## 2023-07-06 RX ORDER — SODIUM CHLORIDE 0.9 % (FLUSH) 0.9 %
5-40 SYRINGE (ML) INJECTION PRN
OUTPATIENT
Start: 2023-07-06

## 2023-07-06 RX ORDER — ONDANSETRON 2 MG/ML
8 INJECTION INTRAMUSCULAR; INTRAVENOUS
OUTPATIENT
Start: 2023-07-06

## 2023-07-06 RX ORDER — ACETAMINOPHEN 325 MG/1
650 TABLET ORAL
OUTPATIENT
Start: 2023-07-06

## 2023-07-06 RX ADMIN — SODIUM CHLORIDE, PRESERVATIVE FREE 10 ML: 5 INJECTION INTRAVENOUS at 13:12

## 2023-07-06 RX ADMIN — IRON SUCROSE 200 MG: 20 INJECTION, SOLUTION INTRAVENOUS at 13:24

## 2023-07-27 LAB
ABSOLUTE BASO #: 0.04 K/UL (ref 0–0.2)
ABSOLUTE EOS #: 0.17 K/UL (ref 0–0.5)
ABSOLUTE LYMPH #: 0.99 K/UL (ref 1–4)
ABSOLUTE MONO #: 0.52 K/UL (ref 0.2–1)
ABSOLUTE NEUT #: 3.58 K/UL (ref 1.5–7.5)
BASOPHILS RELATIVE PERCENT: 0.8 %
EOSINOPHILS RELATIVE PERCENT: 3.2 %
FERRITIN: 23 NG/ML (ref 13–200)
HCT VFR BLD CALC: 29.7 % (ref 34–45)
HEMOGLOBIN: 9 G/DL (ref 11.5–15.5)
IRON SATURATION: 8 % (ref 20–50)
IRON, SERUM: 31 UG/DL (ref 37–145)
LYMPHOCYTE %: 18.6 %
MCH RBC QN AUTO: 24 PG (ref 25–33)
MCHC RBC AUTO-ENTMCNC: 30.3 G/DL (ref 31–36)
MCV RBC AUTO: 79.2 FL (ref 80–99)
MONOCYTES # BLD: 9.8 %
NEUTROPHILS RELATIVE PERCENT: 67.2 %
PDW BLD-RTO: 19.4 % (ref 11.5–15)
PLATELETS: 288 K/UL (ref 130–400)
PMV BLD AUTO: 9.6 FL (ref 9.3–13)
RBC: 3.75 M/UL (ref 3.8–5.4)
TOTAL IRON BINDING CAPACITY: 406 UG/DL (ref 250–450)
UNSATURATED IRON BINDING CAPACITY: 375 UG/DL (ref 112–347)
WBC: 5.3 K/UL (ref 3.5–11)

## 2023-08-07 ENCOUNTER — OFFICE VISIT (OUTPATIENT)
Dept: ONCOLOGY | Age: 78
End: 2023-08-07
Payer: MEDICARE

## 2023-08-07 VITALS
HEART RATE: 56 BPM | WEIGHT: 210 LBS | BODY MASS INDEX: 36.05 KG/M2 | DIASTOLIC BLOOD PRESSURE: 57 MMHG | RESPIRATION RATE: 18 BRPM | SYSTOLIC BLOOD PRESSURE: 116 MMHG | TEMPERATURE: 97.6 F

## 2023-08-07 DIAGNOSIS — I26.99 BILATERAL PULMONARY EMBOLISM (HCC): ICD-10-CM

## 2023-08-07 DIAGNOSIS — K31.811 GASTRIC HEMORRHAGE DUE TO GASTRIC ANTRAL VASCULAR ECTASIA (GAVE): ICD-10-CM

## 2023-08-07 DIAGNOSIS — D50.0 IRON DEFICIENCY ANEMIA DUE TO CHRONIC BLOOD LOSS: ICD-10-CM

## 2023-08-07 DIAGNOSIS — N18.2 CHRONIC KIDNEY DISEASE, STAGE 2 (MILD): Primary | ICD-10-CM

## 2023-08-07 PROCEDURE — 1123F ACP DISCUSS/DSCN MKR DOCD: CPT | Performed by: INTERNAL MEDICINE

## 2023-08-07 PROCEDURE — 99215 OFFICE O/P EST HI 40 MIN: CPT | Performed by: INTERNAL MEDICINE

## 2023-08-07 PROCEDURE — 3074F SYST BP LT 130 MM HG: CPT | Performed by: INTERNAL MEDICINE

## 2023-08-07 PROCEDURE — 3078F DIAST BP <80 MM HG: CPT | Performed by: INTERNAL MEDICINE

## 2023-08-07 NOTE — PROGRESS NOTES
Patient ID: Yo Rodriguez, 1945, [id number], 78 y.o. Referred by :  No ref. provider found   Reason for consultation: Liver lesion/recurrent thromboembolism/iron deficient anemia      HISTORY OF PRESENT ILLNESS:    Oncologic History:    Yo Rodriguez is a very pleasant 78 y.o. female. Referred for recurrent pulmonary embolism liver lesion and iron deficiency anemia rule out malignancy  Kevin Dallas was diagnosed with right upper extremity DVT on April 2022 which was found when she had a mammogram done and it was apparently an incidental finding she was referred to vascular surgery and she was seen by Dr. Sampson Rosa at that time she had received anticoagulation with Xarelto for about 2 to 3 months according to patient it was discontinue. On 12/17/2022 she presented to emergency room at that time with right lower extremity swelling pain and redness at that time she was found to have acute DVT which involved right femoral-popliteal tibial and peroneal veins. Patient also had a CTA chest done at that time and CT chest was significant for bilateral pulm embolism left upper/lower lobe segmental/subsegmental and right pulmonary artery segmental branches pulm embolism. She was also found to have a right hepatic about 3.1 cm exophytic posterior inferior right lower lobe of liver hypodensity/mass and MRI of the liver was recommended at that time. Patient at that time was admitted to hospital for 3 days started on anticoagulation and currently she is on Xarelto.   CBC compatible with microcytic anemia and patient does have hemorrhoids but no active bleed  Anemia work-up compatible of iron deficiency anemia  CEA in the normal limit  Hesitant to have endoscopy and colonoscopy however scheduled for June  Scan of the abdomen and MRI of the liver> hemorrhagic cyst in the kidney and cyst in the liver    Hematology treatment  IV iron  Xarelto which changed to Eliquis due to untreated GI bleed    Interval history  Patient

## 2023-08-14 RX ORDER — DIPHENHYDRAMINE HYDROCHLORIDE 50 MG/ML
50 INJECTION INTRAMUSCULAR; INTRAVENOUS
OUTPATIENT
Start: 2023-08-21

## 2023-08-14 RX ORDER — SODIUM CHLORIDE 9 MG/ML
INJECTION, SOLUTION INTRAVENOUS CONTINUOUS
OUTPATIENT
Start: 2023-08-21

## 2023-08-14 RX ORDER — ONDANSETRON 2 MG/ML
8 INJECTION INTRAMUSCULAR; INTRAVENOUS
OUTPATIENT
Start: 2023-08-21

## 2023-08-14 RX ORDER — FAMOTIDINE 10 MG/ML
20 INJECTION, SOLUTION INTRAVENOUS
OUTPATIENT
Start: 2023-08-21

## 2023-08-14 RX ORDER — SODIUM CHLORIDE 9 MG/ML
5-250 INJECTION, SOLUTION INTRAVENOUS PRN
OUTPATIENT
Start: 2023-08-21

## 2023-08-14 RX ORDER — ALBUTEROL SULFATE 90 UG/1
4 AEROSOL, METERED RESPIRATORY (INHALATION) PRN
OUTPATIENT
Start: 2023-08-21

## 2023-08-14 RX ORDER — SODIUM CHLORIDE 0.9 % (FLUSH) 0.9 %
5-40 SYRINGE (ML) INJECTION PRN
OUTPATIENT
Start: 2023-08-21

## 2023-08-14 RX ORDER — EPINEPHRINE 1 MG/ML
0.3 INJECTION, SOLUTION, CONCENTRATE INTRAVENOUS PRN
OUTPATIENT
Start: 2023-08-21

## 2023-08-14 RX ORDER — HEPARIN 100 UNIT/ML
500 SYRINGE INTRAVENOUS PRN
OUTPATIENT
Start: 2023-08-21

## 2023-08-14 RX ORDER — ACETAMINOPHEN 325 MG/1
650 TABLET ORAL
OUTPATIENT
Start: 2023-08-21

## 2023-08-18 ENCOUNTER — TELEPHONE (OUTPATIENT)
Dept: PREADMISSION TESTING | Age: 78
End: 2023-08-18

## 2023-08-18 ENCOUNTER — TELEPHONE (OUTPATIENT)
Dept: GASTROENTEROLOGY | Age: 78
End: 2023-08-18

## 2023-08-18 NOTE — PROGRESS NOTES
Patient instructed on the pre-operative, intra-operative, and post-operative process. Patient instructed on NPO status. Medication instructions and pre operative instruction sheet reviewed over the phone. Instructed pt to stop taking all OTC vitamins 7 days prior to surgery and to take zoloft with a small sip of water prior to arriving to the hospital the day of the surgery.

## 2023-08-18 NOTE — TELEPHONE ENCOUNTER
Call received from patient to request to schedule repeat EGD with Dr. Nicole Jackson per advise from office visit with Dr. Kenia Winchester on 08/07/23. Notes reviewed from last procedure with Dr. Nicole Jackson (repeat in 3-6 months) This Sn advised patient of open spot for this upcoming Wed 8/23. Patient in agreement to scheduling for this date. Patient medications reviewed, This SN spoke with nurse at Dr. Maria L Price office whom advised patient is ok to stop xarelto this Sat. And to restart the day after procedure. Patient advised to stop fish oil today. Prep instructions provided to patient via telephone and patient able to verbalize instructions back to this SN. Patient voiced knowledge and understanding.

## 2023-08-21 NOTE — TELEPHONE ENCOUNTER
Chart reviewed. EKG 2/16/2023 - SB, 1'AV block, nonspecific ST abnormality. Echo 2/16/2023 EF 65%, normal valves. 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 CKD stage 2.     OK to proceed with anesthesia

## 2023-08-22 ENCOUNTER — ANESTHESIA EVENT (OUTPATIENT)
Dept: OPERATING ROOM | Age: 78
End: 2023-08-22
Payer: MEDICARE

## 2023-08-22 ENCOUNTER — HOSPITAL ENCOUNTER (OUTPATIENT)
Dept: INFUSION THERAPY | Age: 78
Discharge: HOME | End: 2023-08-22
Payer: MEDICARE

## 2023-08-22 VITALS
RESPIRATION RATE: 18 BRPM | HEART RATE: 58 BPM | TEMPERATURE: 97.6 F | SYSTOLIC BLOOD PRESSURE: 130 MMHG | DIASTOLIC BLOOD PRESSURE: 79 MMHG

## 2023-08-22 DIAGNOSIS — D50.0 IRON DEFICIENCY ANEMIA DUE TO CHRONIC BLOOD LOSS: Primary | ICD-10-CM

## 2023-08-22 PROCEDURE — 6360000002 HC RX W HCPCS: Performed by: INTERNAL MEDICINE

## 2023-08-22 PROCEDURE — 96365 THER/PROPH/DIAG IV INF INIT: CPT

## 2023-08-22 PROCEDURE — 2580000003 HC RX 258: Performed by: INTERNAL MEDICINE

## 2023-08-22 RX ORDER — HEPARIN 100 UNIT/ML
500 SYRINGE INTRAVENOUS PRN
Status: CANCELLED | OUTPATIENT
Start: 2023-08-29

## 2023-08-22 RX ORDER — SODIUM CHLORIDE 9 MG/ML
5-250 INJECTION, SOLUTION INTRAVENOUS PRN
Status: CANCELLED | OUTPATIENT
Start: 2023-08-29

## 2023-08-22 RX ORDER — SODIUM CHLORIDE 9 MG/ML
5-250 INJECTION, SOLUTION INTRAVENOUS PRN
Status: DISCONTINUED | OUTPATIENT
Start: 2023-08-22 | End: 2023-08-23 | Stop reason: HOSPADM

## 2023-08-22 RX ORDER — DIPHENHYDRAMINE HYDROCHLORIDE 50 MG/ML
50 INJECTION INTRAMUSCULAR; INTRAVENOUS
Status: CANCELLED | OUTPATIENT
Start: 2023-08-29

## 2023-08-22 RX ORDER — SODIUM CHLORIDE 0.9 % (FLUSH) 0.9 %
5-40 SYRINGE (ML) INJECTION PRN
Status: CANCELLED | OUTPATIENT
Start: 2023-08-29

## 2023-08-22 RX ORDER — SODIUM CHLORIDE 9 MG/ML
INJECTION, SOLUTION INTRAVENOUS CONTINUOUS
Status: CANCELLED | OUTPATIENT
Start: 2023-08-29

## 2023-08-22 RX ORDER — EPINEPHRINE 1 MG/ML
0.3 INJECTION, SOLUTION, CONCENTRATE INTRAVENOUS PRN
Status: CANCELLED | OUTPATIENT
Start: 2023-08-29

## 2023-08-22 RX ORDER — SODIUM CHLORIDE 0.9 % (FLUSH) 0.9 %
5-40 SYRINGE (ML) INJECTION PRN
Status: DISCONTINUED | OUTPATIENT
Start: 2023-08-22 | End: 2023-08-23 | Stop reason: HOSPADM

## 2023-08-22 RX ORDER — ACETAMINOPHEN 325 MG/1
650 TABLET ORAL
Status: CANCELLED | OUTPATIENT
Start: 2023-08-29

## 2023-08-22 RX ORDER — ALBUTEROL SULFATE 90 UG/1
4 AEROSOL, METERED RESPIRATORY (INHALATION) PRN
Status: CANCELLED | OUTPATIENT
Start: 2023-08-29

## 2023-08-22 RX ORDER — ONDANSETRON 2 MG/ML
8 INJECTION INTRAMUSCULAR; INTRAVENOUS
Status: CANCELLED | OUTPATIENT
Start: 2023-08-29

## 2023-08-22 RX ORDER — FAMOTIDINE 10 MG/ML
20 INJECTION, SOLUTION INTRAVENOUS
Status: CANCELLED | OUTPATIENT
Start: 2023-08-29

## 2023-08-22 RX ADMIN — SODIUM CHLORIDE 100 ML/HR: 9 INJECTION, SOLUTION INTRAVENOUS at 11:59

## 2023-08-22 RX ADMIN — FERUMOXYTOL 510 MG: 510 INJECTION INTRAVENOUS at 12:01

## 2023-08-23 ENCOUNTER — HOSPITAL ENCOUNTER (OUTPATIENT)
Age: 78
Setting detail: OUTPATIENT SURGERY
Discharge: HOME | End: 2023-08-23
Attending: INTERNAL MEDICINE | Admitting: INTERNAL MEDICINE
Payer: MEDICARE

## 2023-08-23 ENCOUNTER — ANESTHESIA (OUTPATIENT)
Dept: OPERATING ROOM | Age: 78
End: 2023-08-23
Payer: MEDICARE

## 2023-08-23 VITALS
HEART RATE: 56 BPM | OXYGEN SATURATION: 94 % | SYSTOLIC BLOOD PRESSURE: 155 MMHG | HEIGHT: 64 IN | WEIGHT: 210 LBS | BODY MASS INDEX: 35.85 KG/M2 | DIASTOLIC BLOOD PRESSURE: 60 MMHG | TEMPERATURE: 98 F | RESPIRATION RATE: 16 BRPM

## 2023-08-23 PROCEDURE — 3609017100 HC EGD: Performed by: INTERNAL MEDICINE

## 2023-08-23 PROCEDURE — 7100000010 HC PHASE II RECOVERY - FIRST 15 MIN: Performed by: INTERNAL MEDICINE

## 2023-08-23 PROCEDURE — 2500000003 HC RX 250 WO HCPCS: Performed by: NURSE ANESTHETIST, CERTIFIED REGISTERED

## 2023-08-23 PROCEDURE — 7100000011 HC PHASE II RECOVERY - ADDTL 15 MIN: Performed by: INTERNAL MEDICINE

## 2023-08-23 PROCEDURE — 2709999900 HC NON-CHARGEABLE SUPPLY: Performed by: INTERNAL MEDICINE

## 2023-08-23 PROCEDURE — 6360000002 HC RX W HCPCS: Performed by: NURSE ANESTHETIST, CERTIFIED REGISTERED

## 2023-08-23 PROCEDURE — 43235 EGD DIAGNOSTIC BRUSH WASH: CPT | Performed by: INTERNAL MEDICINE

## 2023-08-23 PROCEDURE — 3700000000 HC ANESTHESIA ATTENDED CARE: Performed by: INTERNAL MEDICINE

## 2023-08-23 RX ORDER — SODIUM CHLORIDE, SODIUM LACTATE, POTASSIUM CHLORIDE, CALCIUM CHLORIDE 600; 310; 30; 20 MG/100ML; MG/100ML; MG/100ML; MG/100ML
INJECTION, SOLUTION INTRAVENOUS CONTINUOUS
Status: DISCONTINUED | OUTPATIENT
Start: 2023-08-23 | End: 2023-08-23 | Stop reason: HOSPADM

## 2023-08-23 RX ORDER — SODIUM CHLORIDE 0.9 % (FLUSH) 0.9 %
5-40 SYRINGE (ML) INJECTION EVERY 12 HOURS SCHEDULED
Status: CANCELLED | OUTPATIENT
Start: 2023-08-23

## 2023-08-23 RX ORDER — SODIUM CHLORIDE 9 MG/ML
INJECTION, SOLUTION INTRAVENOUS PRN
Status: CANCELLED | OUTPATIENT
Start: 2023-08-23

## 2023-08-23 RX ORDER — LIDOCAINE HYDROCHLORIDE 20 MG/ML
INJECTION, SOLUTION EPIDURAL; INFILTRATION; INTRACAUDAL; PERINEURAL PRN
Status: DISCONTINUED | OUTPATIENT
Start: 2023-08-23 | End: 2023-08-23 | Stop reason: SDUPTHER

## 2023-08-23 RX ORDER — SODIUM CHLORIDE 0.9 % (FLUSH) 0.9 %
5-40 SYRINGE (ML) INJECTION PRN
Status: CANCELLED | OUTPATIENT
Start: 2023-08-23

## 2023-08-23 RX ORDER — PROPOFOL 10 MG/ML
INJECTION, EMULSION INTRAVENOUS PRN
Status: DISCONTINUED | OUTPATIENT
Start: 2023-08-23 | End: 2023-08-23 | Stop reason: SDUPTHER

## 2023-08-23 RX ADMIN — PROPOFOL 70 MG: 10 INJECTION, EMULSION INTRAVENOUS at 15:10

## 2023-08-23 RX ADMIN — LIDOCAINE HYDROCHLORIDE 200 MG: 20 INJECTION, SOLUTION EPIDURAL; INFILTRATION; INTRACAUDAL; PERINEURAL at 15:10

## 2023-08-23 NOTE — ANESTHESIA POSTPROCEDURE EVALUATION
Department of Anesthesiology  Postprocedure Note    Patient: Jennifer Flynn  MRN: [id number]  YOB: 1945  Date of evaluation: 8/23/2023      Procedure Summary     Date: 08/23/23 Room / Location: [address] [place] [address]    Anesthesia Start: 0364 Anesthesia Stop: 0064    Procedure: EGD ESOPHAGOGASTRODUODENOSCOPY Diagnosis:       Anemia, unspecified type      (Anemia, unspecified type [D64.9])    Surgeons: Jude Farris MD Responsible Provider: GRIS Cardona CRNA    Anesthesia Type: general, TIVA ASA Status: 3          Anesthesia Type: No value filed.     Katiana Phase I: Katiana Score: 10    Katiana Phase II: Katiana Score: 10      Anesthesia Post Evaluation    Patient location during evaluation: PACU  Patient participation: complete - patient participated  Level of consciousness: awake and alert  Pain score: 0  Airway patency: patent  Nausea & Vomiting: no nausea and no vomiting  Complications: no  Cardiovascular status: blood pressure returned to baseline  Respiratory status: acceptable and room air  Hydration status: stable  Pain management: adequate and satisfactory to patient

## 2023-08-23 NOTE — PROGRESS NOTES
Patient verbalizes readiness for discharge at this time. Discharge Criteria    Inpatients must meet Criteria 1 through 7. All other patients are either YES or N/A. If a NO is chosen then Anesthesia or Surgeon must be notified. 1.  Minimum 30 minutes after last dose of sedative medication. Yes      2. Systolic BP between 90 - 860. Diastolic BP between 60 - 90. Yes      3. Pulse between 60 - 120    Yes      4. Respirations between 8 - 25. Yes      5. SpO2 92% - 100%. Yes      6. Able to cough and swallow or return to baseline function. Yes      7. Alert and oriented or return to baseline mental status. Yes      8. Demonstrates controlled, coordinated movements, ambulates with steady gait, or return to baseline activity function. Yes      9. Minimal or no pain or nausea, or at a level tolerable and acceptable to patient. Yes      10. Takes and retains oral fluids as allowed. Yes      11. Procedural / perioperative site stable. Minimal or no bleeding. Yes          12. If GI endoscopy procedure, minimal or no abdominal distention or passing flatus. Yes      13. Written discharge instructions and emergency telephone number provided. Yes      14. Accompanied by a responsible adult.     Yes

## 2023-08-23 NOTE — DISCHARGE INSTRUCTIONS
SAME DAY SURGERY DISCHARGE INSTRUCTIONS    1. Do not drive or operate hazardous machinery for 24 hours. 2.  Do not make important personal or business decisions for 24 hours. 3.  Do not drink alcoholic beverages for 24 hours. 4.  Do not smoke tobacco products for 24 hours. 5.  Eat light foods (Jell-O, soups, etc....) and drink plenty of fluids (water, Sprite, etc...) up to 8 glasses per day, as you can tolerate. 6.  Limit your activities for 24 hours. Do not engage in heavy work until your surgeon gives you permission. 7.  Call your surgeon for any questions regarding your surgery. 8.  Patient should not be left alone for 12-24 hours following surgical procedure. ENDOSCOPY DISCHARGE INSTRUCTIONS:    You may have a mild sore throat; this should get better over the next day or two. Sipping warm liquids, a salt-water gargle or throat lozenges may be used. You may have some belching or a feeling of fullness in your abdomen. This is from air that was put into your stomach during the procedure. This should pass in a few hours. May resume your regular diet. CALL THE DOCTOR IF YOU HAVE:    Chest pain or trouble breathing. A hoarse voice or trouble swallowing    Bleeding, vomiting or spitting up of blood that is more than a few streaks or red or black stools    A fever above 101F or if you have chills    Pain that is worse or different than any pain you had before the procedure    Nausea or vomiting that lasts for more than 2 hours. If symptoms are to severe call 911 or go to the nearest Emergency Room.

## 2023-08-23 NOTE — H&P
Went into patients room to get ready to go upstairs and patient is eating whopper and fries.  No blood sugar was checked prior to patient eating this.    year old woman with a past medical history of pulmonary embolism, deep vein thrombosis, arthritis who presents for iron deficiency anemia. Colonoscopy and EGD from 08/2019 showed a internal hemorrhoids/diverticulosis and a hiatal hernia. She will need a repeat EGD for [facility] lesions. Mallampati score 2  ASA 2     Plan    1. Iron deficiency anemia, unspecified iron deficiency anemia type  - EGD     2. F/U based on findings      VERIFICATION OF CONSENT    The patient was counseled regarding the procedure, its indications, risks, potential complications and alternatives. Any questions were answered.  Consent was obtained    Electronically signed by Olimpia Salter MD on 8/23/2023 at 2:54 PM

## 2023-08-23 NOTE — ANESTHESIA PRE PROCEDURE
Department of Anesthesiology  Preprocedure Note       Name:  Rosalva Montoya   Age:  78 y.o.  :  1945                                          MRN:  [id number]         Date:  2023      Surgeon: Celia Shepherd):  Ernie Lizarraga MD    Procedure: Procedure(s):  EGD ESOPHAGOGASTRODUODENOSCOPY    Medications prior to admission:   Prior to Admission medications    Medication Sig Start Date End Date Taking? Authorizing Provider   rivaroxaban (XARELTO) 20 MG TABS tablet Take 1 tablet by mouth    Historical Provider, MD   apixaban (ELIQUIS) 2.5 MG TABS tablet Take 1 tablet by mouth 2 times daily  Patient not taking: Reported on 2023   Martin Ricks MD   albuterol sulfate (PROAIR RESPICLICK) 943 (90 Base) MCG/ACT aerosol powder inhalation Inhale into the lungs every 4 hours as needed for Wheezing or Shortness of Breath  Patient not taking: Reported on 2023    Historical Provider, MD   vitamin C (ASCORBIC ACID) 500 MG tablet Take 1 tablet by mouth daily    Historical Provider, MD   Glycerin-Polysorbate 80 (REFRESH DRY EYE THERAPY OP) Apply 1 drop to eye as needed    Historical Provider, MD   oxybutynin (DITROPAN XL) 15 MG extended release tablet Take 1 tablet by mouth daily 22   Amanda Rosario,    Multiple Vitamins-Minerals (DRY EYE FORMULA PO)     Historical Provider, MD   omeprazole (PRILOSEC) 20 MG capsule Take 1 capsule by mouth in the morning and at bedtime    Historical Provider, MD   atenolol-chlorthalidone (TENORETIC) 100-25 MG per tablet Take 1 tablet by mouth daily    Historical Provider, MD   sertraline (ZOLOFT) 50 MG tablet Take 1 tablet by mouth daily    Historical Provider, MD   Omega-3 Fatty Acids (FISH OIL) 1200 MG CAPS Take 1 tablet by mouth. Historical Provider, MD   calcium carbonate 600 MG TABS tablet Take 1 tablet by mouth in the morning and 1 tablet in the evening.     Historical Provider, MD       Current medications:    No current facility-administered

## 2023-08-28 ENCOUNTER — TELEPHONE (OUTPATIENT)
Dept: GASTROENTEROLOGY | Age: 78
End: 2023-08-28

## 2023-08-28 ENCOUNTER — HOSPITAL ENCOUNTER (OUTPATIENT)
Dept: INFUSION THERAPY | Age: 78
Discharge: HOME | End: 2023-08-28
Payer: MEDICARE

## 2023-08-28 VITALS
HEART RATE: 48 BPM | RESPIRATION RATE: 18 BRPM | SYSTOLIC BLOOD PRESSURE: 125 MMHG | DIASTOLIC BLOOD PRESSURE: 58 MMHG | TEMPERATURE: 97.3 F

## 2023-08-28 DIAGNOSIS — D50.0 IRON DEFICIENCY ANEMIA DUE TO CHRONIC BLOOD LOSS: Primary | ICD-10-CM

## 2023-08-28 PROCEDURE — 6360000002 HC RX W HCPCS: Performed by: INTERNAL MEDICINE

## 2023-08-28 PROCEDURE — 96365 THER/PROPH/DIAG IV INF INIT: CPT

## 2023-08-28 PROCEDURE — 2580000003 HC RX 258: Performed by: INTERNAL MEDICINE

## 2023-08-28 RX ORDER — ACETAMINOPHEN 325 MG/1
650 TABLET ORAL
OUTPATIENT
Start: 2023-08-29

## 2023-08-28 RX ORDER — ONDANSETRON 2 MG/ML
8 INJECTION INTRAMUSCULAR; INTRAVENOUS
OUTPATIENT
Start: 2023-08-29

## 2023-08-28 RX ORDER — ALBUTEROL SULFATE 90 UG/1
4 AEROSOL, METERED RESPIRATORY (INHALATION) PRN
OUTPATIENT
Start: 2023-08-29

## 2023-08-28 RX ORDER — SODIUM CHLORIDE 9 MG/ML
5-250 INJECTION, SOLUTION INTRAVENOUS PRN
Status: DISCONTINUED | OUTPATIENT
Start: 2023-08-28 | End: 2023-08-29 | Stop reason: HOSPADM

## 2023-08-28 RX ORDER — DIPHENHYDRAMINE HYDROCHLORIDE 50 MG/ML
50 INJECTION INTRAMUSCULAR; INTRAVENOUS
OUTPATIENT
Start: 2023-08-29

## 2023-08-28 RX ORDER — FAMOTIDINE 10 MG/ML
20 INJECTION, SOLUTION INTRAVENOUS
OUTPATIENT
Start: 2023-08-29

## 2023-08-28 RX ORDER — SODIUM CHLORIDE 0.9 % (FLUSH) 0.9 %
5-40 SYRINGE (ML) INJECTION PRN
OUTPATIENT
Start: 2023-08-29

## 2023-08-28 RX ORDER — SODIUM CHLORIDE 9 MG/ML
INJECTION, SOLUTION INTRAVENOUS CONTINUOUS
OUTPATIENT
Start: 2023-08-29

## 2023-08-28 RX ORDER — EPINEPHRINE 1 MG/ML
0.3 INJECTION, SOLUTION, CONCENTRATE INTRAVENOUS PRN
OUTPATIENT
Start: 2023-08-29

## 2023-08-28 RX ORDER — SODIUM CHLORIDE 9 MG/ML
5-250 INJECTION, SOLUTION INTRAVENOUS PRN
OUTPATIENT
Start: 2023-08-29

## 2023-08-28 RX ORDER — SODIUM CHLORIDE 0.9 % (FLUSH) 0.9 %
5-40 SYRINGE (ML) INJECTION PRN
Status: DISCONTINUED | OUTPATIENT
Start: 2023-08-28 | End: 2023-08-29 | Stop reason: HOSPADM

## 2023-08-28 RX ORDER — HEPARIN 100 UNIT/ML
500 SYRINGE INTRAVENOUS PRN
OUTPATIENT
Start: 2023-08-29

## 2023-08-28 RX ORDER — SODIUM CHLORIDE 9 MG/ML
5-250 INJECTION, SOLUTION INTRAVENOUS PRN
Status: CANCELLED | OUTPATIENT
Start: 2023-08-29

## 2023-08-28 RX ADMIN — SODIUM CHLORIDE, PRESERVATIVE FREE 10 ML: 5 INJECTION INTRAVENOUS at 11:25

## 2023-08-28 RX ADMIN — SODIUM CHLORIDE 100 ML/HR: 9 INJECTION, SOLUTION INTRAVENOUS at 11:34

## 2023-08-28 RX ADMIN — FERUMOXYTOL 510 MG: 510 INJECTION INTRAVENOUS at 11:34

## 2023-08-28 NOTE — TELEPHONE ENCOUNTER
I called Corinne Ervin and left a voicemail that Dr. Damaso Young would like to know if she would like to proceed with video capsule endoscopy. It would require a referral to [facility]. I asked that she please call us back with any questions and to let us know how she would like to proceed.

## 2023-08-28 NOTE — TELEPHONE ENCOUNTER
Can you please call this patient and ask her if she would like us to proceed with a capsule study? This will be a referral to [facility]. Erlin's   Thanks   Dr. Xochilt Pierre

## 2023-08-29 NOTE — TELEPHONE ENCOUNTER
Return call received from patient. She states that she has had a previous discussion with Dr. Dong Lucero regarding the capsule study and she  is scheduled with Dr. Dong Lucero in November and does have orders for upcoming lab work to be completed. She would like to get the results of her blood work and will then discuss with Dr. Dong Lucero at her November appointment regarding the capsule study if her iron values  have not changed.

## 2023-10-05 ENCOUNTER — OFFICE VISIT (OUTPATIENT)
Dept: PULMONOLOGY | Age: 78
End: 2023-10-05
Payer: MEDICARE

## 2023-10-05 VITALS
WEIGHT: 206.5 LBS | RESPIRATION RATE: 16 BRPM | BODY MASS INDEX: 35.26 KG/M2 | SYSTOLIC BLOOD PRESSURE: 125 MMHG | DIASTOLIC BLOOD PRESSURE: 65 MMHG | OXYGEN SATURATION: 96 % | HEART RATE: 58 BPM | TEMPERATURE: 97.2 F | HEIGHT: 64 IN

## 2023-10-05 DIAGNOSIS — Z86.718 HISTORY OF DVT OF LOWER EXTREMITY: ICD-10-CM

## 2023-10-05 DIAGNOSIS — I26.99 OTHER ACUTE PULMONARY EMBOLISM WITHOUT ACUTE COR PULMONALE (HCC): Primary | ICD-10-CM

## 2023-10-05 DIAGNOSIS — E66.9 OBESITY (BMI 35.0-39.9 WITHOUT COMORBIDITY): ICD-10-CM

## 2023-10-05 DIAGNOSIS — G47.33 OSA TREATED WITH BIPAP: ICD-10-CM

## 2023-10-05 PROCEDURE — 99214 OFFICE O/P EST MOD 30 MIN: CPT | Performed by: INTERNAL MEDICINE

## 2023-10-05 PROCEDURE — 3074F SYST BP LT 130 MM HG: CPT | Performed by: INTERNAL MEDICINE

## 2023-10-05 PROCEDURE — 1123F ACP DISCUSS/DSCN MKR DOCD: CPT | Performed by: INTERNAL MEDICINE

## 2023-10-05 PROCEDURE — 3078F DIAST BP <80 MM HG: CPT | Performed by: INTERNAL MEDICINE

## 2023-10-05 NOTE — PROGRESS NOTES
by Roxana Llanes MD at [address]: Allergies   Allergen Reactions    Sulfamethoxazole-Trimethoprim Rash    Aspirin Other (See Comments)     H/O brain bleed in 1983; told to never take aspirin products      Iron Nausea Only     OTC    Sulfa Antibiotics Rash         Home Meds:   Outpatient Encounter Medications as of 10/5/2023   Medication Sig Dispense Refill    apixaban (ELIQUIS) 2.5 MG TABS tablet Take 1 tablet by mouth 2 times daily 180 tablet 1    albuterol sulfate (PROAIR RESPICLICK) 923 (90 Base) MCG/ACT aerosol powder inhalation Inhale into the lungs every 4 hours as needed for Wheezing or Shortness of Breath      vitamin C (ASCORBIC ACID) 500 MG tablet Take 1 tablet by mouth daily      Glycerin-Polysorbate 80 (REFRESH DRY EYE THERAPY OP) Apply 1 drop to eye as needed      oxybutynin (DITROPAN XL) 15 MG extended release tablet Take 1 tablet by mouth daily 30 tablet 11    Multiple Vitamins-Minerals (DRY EYE FORMULA PO)       omeprazole (PRILOSEC) 20 MG capsule Take 1 capsule by mouth in the morning and at bedtime      atenolol-chlorthalidone (TENORETIC) 100-25 MG per tablet Take 1 tablet by mouth daily      sertraline (ZOLOFT) 50 MG tablet Take 1 tablet by mouth daily      Omega-3 Fatty Acids (FISH OIL) 1200 MG CAPS Take 1 tablet by mouth.      calcium carbonate 600 MG TABS tablet Take 1 tablet by mouth in the morning and 1 tablet in the evening. No facility-administered encounter medications on file as of 10/5/2023. Social History:   TOBACCO:   reports that she has never smoked. She has never used smokeless tobacco.  ETOH:   reports no history of alcohol use.   OCCUPATION:      Family History:       Problem Relation Age of Onset    Deep Vein Thrombosis Mother     Colon Cancer Nephew        Immunizations:    Immunization History   Administered Date(s) Administered    COVID-19, MODERNA BLUE border, Primary or Immunocompromised, (age 12y+), IM, 100 mcg/0.5mL 01/26/2021, 02/23/2021

## 2023-10-05 NOTE — PATIENT INSTRUCTIONS
SURVEY:    You may be receiving a survey from [employer] regarding your visit today. Please complete the survey to enable us to provide the highest quality of care to you and your family. If you cannot score us a very good on any question, please call the office to discuss how we could have made your experience a very good one. Thank you.

## 2023-11-07 DIAGNOSIS — D50.0 IRON DEFICIENCY ANEMIA DUE TO CHRONIC BLOOD LOSS: ICD-10-CM

## 2023-11-13 ENCOUNTER — OFFICE VISIT (OUTPATIENT)
Dept: ONCOLOGY | Age: 78
End: 2023-11-13
Payer: MEDICARE

## 2023-11-13 VITALS
SYSTOLIC BLOOD PRESSURE: 129 MMHG | HEART RATE: 55 BPM | TEMPERATURE: 98.1 F | WEIGHT: 209 LBS | HEIGHT: 64 IN | DIASTOLIC BLOOD PRESSURE: 74 MMHG | RESPIRATION RATE: 18 BRPM | BODY MASS INDEX: 35.68 KG/M2

## 2023-11-13 DIAGNOSIS — K64.8 INTERNAL AND EXTERNAL HEMORRHOIDS WITHOUT COMPLICATION: ICD-10-CM

## 2023-11-13 DIAGNOSIS — K64.4 INTERNAL AND EXTERNAL HEMORRHOIDS WITHOUT COMPLICATION: ICD-10-CM

## 2023-11-13 DIAGNOSIS — I27.82 CHRONIC PULMONARY EMBOLISM, UNSPECIFIED PULMONARY EMBOLISM TYPE, UNSPECIFIED WHETHER ACUTE COR PULMONALE PRESENT (HCC): ICD-10-CM

## 2023-11-13 DIAGNOSIS — I82.519 CHRONIC DEEP VEIN THROMBOSIS (DVT) OF FEMORAL VEIN, UNSPECIFIED LATERALITY (HCC): ICD-10-CM

## 2023-11-13 DIAGNOSIS — D50.0 IRON DEFICIENCY ANEMIA DUE TO CHRONIC BLOOD LOSS: Primary | ICD-10-CM

## 2023-11-13 PROCEDURE — 99214 OFFICE O/P EST MOD 30 MIN: CPT | Performed by: INTERNAL MEDICINE

## 2023-11-13 PROCEDURE — 1123F ACP DISCUSS/DSCN MKR DOCD: CPT | Performed by: INTERNAL MEDICINE

## 2023-11-13 PROCEDURE — 3078F DIAST BP <80 MM HG: CPT | Performed by: INTERNAL MEDICINE

## 2023-11-13 PROCEDURE — 3074F SYST BP LT 130 MM HG: CPT | Performed by: INTERNAL MEDICINE

## 2023-11-13 RX ORDER — FERROUS SULFATE 325(65) MG
325 TABLET ORAL
Qty: 90 TABLET | Refills: 1 | Status: SHIPPED | OUTPATIENT
Start: 2023-11-13

## 2023-11-13 NOTE — PROGRESS NOTES
Patient ID: Fredy Mcgovern, 1945, [id number], 78 y.o. Referred by :  No ref. provider found   Reason for consultation: Liver lesion/recurrent thromboembolism/iron deficient anemia      HISTORY OF PRESENT ILLNESS:    Oncologic History:    Fredy Mcgovern is a very pleasant 78 y.o. female. Referred for recurrent pulmonary embolism liver lesion and iron deficiency anemia rule out malignancy  Navi Gibson was diagnosed with right upper extremity DVT on April 2022 which was found when she had a mammogram done and it was apparently an incidental finding she was referred to vascular surgery and she was seen by Dr. Dimas Peraza at that time she had received anticoagulation with Xarelto for about 2 to 3 months according to patient it was discontinue. On 12/17/2022 she presented to emergency room at that time with right lower extremity swelling pain and redness at that time she was found to have acute DVT which involved right femoral-popliteal tibial and peroneal veins. Patient also had a CTA chest done at that time and CT chest was significant for bilateral pulm embolism left upper/lower lobe segmental/subsegmental and right pulmonary artery segmental branches pulm embolism. She was also found to have a right hepatic about 3.1 cm exophytic posterior inferior right lower lobe of liver hypodensity/mass and MRI of the liver was recommended at that time. Patient at that time was admitted to hospital for 3 days started on anticoagulation and currently she is on Xarelto.   CBC compatible with microcytic anemia and patient does have hemorrhoids but no active bleed  Anemia work-up compatible of iron deficiency anemia  CEA in the normal limit  Hesitant to have endoscopy and colonoscopy however scheduled for June  Scan of the abdomen and MRI of the liver> hemorrhagic cyst in the kidney and cyst in the liver    Hematology treatment  IV iron  Xarelto which changed to Eliquis due to untreated GI bleed at a dose of 2.5 twice daily  GI

## 2024-01-11 RX ORDER — APIXABAN 2.5 MG/1
2.5 TABLET, FILM COATED ORAL 2 TIMES DAILY
Qty: 180 TABLET | Refills: 3 | Status: SHIPPED | OUTPATIENT
Start: 2024-01-11

## 2024-02-10 LAB
ABSOLUTE BASO #: 0.04 K/UL (ref 0–0.2)
ABSOLUTE EOS #: 0.23 K/UL (ref 0–0.5)
ABSOLUTE LYMPH #: 0.88 K/UL (ref 1–4)
ABSOLUTE MONO #: 0.69 K/UL (ref 0.2–1)
ABSOLUTE NEUT #: 5.03 K/UL (ref 1.5–7.5)
ALBUMIN SERPL-MCNC: 4.2 G/DL (ref 3.5–5.2)
ALK PHOSPHATASE: 63 U/L (ref 40–142)
ALT SERPL-CCNC: 13 U/L (ref 5–40)
ANION GAP SERPL CALCULATED.3IONS-SCNC: 11 MEQ/L (ref 7–16)
AST SERPL-CCNC: 19 U/L (ref 9–40)
BASOPHILS RELATIVE PERCENT: 0.6 %
BILIRUB SERPL-MCNC: 0.2 MG/DL
BUN BLDV-MCNC: 25 MG/DL (ref 8–23)
CALCIUM SERPL-MCNC: 10.7 MG/DL (ref 8.5–10.5)
CHLORIDE BLD-SCNC: 100 MEQ/L (ref 95–107)
CO2: 30 MEQ/L (ref 19–31)
CREAT SERPL-MCNC: 1.08 MG/DL (ref 0.6–1.3)
EGFR IF NONAFRICAN AMERICAN: 52 ML/MIN/1.73
EOSINOPHILS RELATIVE PERCENT: 3.3 %
FERRITIN: 41 NG/ML (ref 13–200)
GLUCOSE: 120 MG/DL (ref 70–99)
HCT VFR BLD CALC: 41.5 % (ref 34–45)
HEMOGLOBIN: 13.6 G/DL (ref 11.5–15.5)
IRON % SATURATION: 15 % (ref 20–50)
IRON, SERUM: 50 UG/DL (ref 37–145)
LYMPHOCYTE %: 12.8 %
MCH RBC QN AUTO: 29.1 PG (ref 25–33)
MCHC RBC AUTO-ENTMCNC: 32.8 G/DL (ref 31–36)
MCV RBC AUTO: 88.9 FL (ref 80–99)
MONOCYTES # BLD: 10 %
NEUTROPHILS RELATIVE PERCENT: 73 %
PDW BLD-RTO: 14 % (ref 11.5–15)
PLATELETS: 242 K/UL (ref 130–400)
PMV BLD AUTO: 10.1 FL (ref 9.3–13)
POTASSIUM SERPL-SCNC: 3.4 MEQ/L (ref 3.5–5.4)
RBC: 4.67 M/UL (ref 3.8–5.4)
SODIUM BLD-SCNC: 141 MEQ/L (ref 133–146)
TOTAL IRON BINDING CAPACITY: 334 UG/DL (ref 250–450)
TOTAL PROTEIN: 6.6 G/DL (ref 6.1–8.3)
UNSATURATED IRON BINDING CAPACITY: 284 UG/DL (ref 112–347)
WBC: 6.9 K/UL (ref 3.5–11)

## 2024-02-12 ENCOUNTER — OFFICE VISIT (OUTPATIENT)
Dept: ONCOLOGY | Age: 79
End: 2024-02-12
Payer: MEDICARE

## 2024-02-12 VITALS
RESPIRATION RATE: 18 BRPM | DIASTOLIC BLOOD PRESSURE: 76 MMHG | TEMPERATURE: 96.8 F | WEIGHT: 214 LBS | SYSTOLIC BLOOD PRESSURE: 150 MMHG | BODY MASS INDEX: 36.73 KG/M2 | HEART RATE: 56 BPM

## 2024-02-12 DIAGNOSIS — D50.0 IRON DEFICIENCY ANEMIA DUE TO CHRONIC BLOOD LOSS: Primary | ICD-10-CM

## 2024-02-12 DIAGNOSIS — N18.2 CHRONIC KIDNEY DISEASE, STAGE 2 (MILD): ICD-10-CM

## 2024-02-12 DIAGNOSIS — I27.82 CHRONIC PULMONARY EMBOLISM, UNSPECIFIED PULMONARY EMBOLISM TYPE, UNSPECIFIED WHETHER ACUTE COR PULMONALE PRESENT (HCC): ICD-10-CM

## 2024-02-12 DIAGNOSIS — I82.519 CHRONIC DEEP VEIN THROMBOSIS (DVT) OF FEMORAL VEIN, UNSPECIFIED LATERALITY (HCC): ICD-10-CM

## 2024-02-12 PROCEDURE — 3077F SYST BP >= 140 MM HG: CPT | Performed by: INTERNAL MEDICINE

## 2024-02-12 PROCEDURE — 1123F ACP DISCUSS/DSCN MKR DOCD: CPT | Performed by: INTERNAL MEDICINE

## 2024-02-12 PROCEDURE — 99214 OFFICE O/P EST MOD 30 MIN: CPT | Performed by: INTERNAL MEDICINE

## 2024-02-12 PROCEDURE — 3078F DIAST BP <80 MM HG: CPT | Performed by: INTERNAL MEDICINE

## 2024-02-12 RX ORDER — PREDNISONE 5 MG/1
5 TABLET ORAL DAILY
COMMUNITY

## 2024-02-12 NOTE — PROGRESS NOTES
lymphadenopathy, no hepatosplenomegaly   Chest - clear to auscultation, no wheezes, rales or rhonchi, symmetric air entry   Heart - normal rate, regular rhythm, normal S1, S2, no murmurs, rubs, clicks or gallops   Abdomen - soft, nontender, nondistended, no masses or organomegaly   Neurological - alert, oriented, normal speech, no focal findings or movement disorder noted   Musculoskeletal - no joint tenderness, deformity or swelling   Extremities - peripheral pulses normal, no pedal edema, no clubbing or cyanosis   Skin - normal coloration and turgor, no rashes, no suspicious skin lesions noted ,      LABORATORY DATA:     Lab Results   Component Value Date    WBC 6.9 02/09/2024    HGB 13.6 02/09/2024    HCT 41.5 02/09/2024    MCV 88.9 02/09/2024     02/09/2024    LYMPHOPCT 12.8 02/09/2024    RBC 4.67 02/09/2024    MCH 29.1 02/09/2024    MCHC 32.8 02/09/2024    RDW 14.0 02/09/2024    NEUTOPHILPCT 73.0 02/09/2024    MONOPCT 10.0 02/09/2024    EOSPCT 3.3 02/09/2024    BASOPCT 0.6 02/09/2024    NEUTROABS 5.03 02/09/2024    LYMPHSABS 0.88 (L) 02/09/2024    MONOSABS 0.69 02/09/2024    EOSABS 0.23 02/09/2024    BASOSABS 0.04 02/09/2024         Chemistry        Component Value Date/Time     02/09/2024 1030    K 3.4 (L) 02/09/2024 1030     02/09/2024 1030    CO2 30 02/09/2024 1030    BUN 25 (H) 02/09/2024 1030    CREATININE 1.08 02/09/2024 1030        Component Value Date/Time    CALCIUM 10.7 (H) 02/09/2024 1030    ALKPHOS 63 02/09/2024 1030    ALKPHOS 53 02/17/2023 0535    AST 19 02/09/2024 1030    ALT 13 02/09/2024 1030    BILITOT 0.2 02/09/2024 1030            PATHOLOGY DATA:         IMAGING DATA:      EGD  No dictation          ASSESSMENT:       Diagnosis Orders   1. Iron deficiency anemia due to chronic blood loss  Iron and TIBC    Ferritin      2. Chronic pulmonary embolism, unspecified pulmonary embolism type, unspecified whether acute cor pulmonale present (HCC)  CBC with Auto Differential      3.

## 2024-03-12 NOTE — PROGRESS NOTES
Subjective:      Patient ID: Kaye Joyce is a 78 y.o. female.     Patient must see physician at next appointment -Dr. Barnett    HPI  Patient here for follow-up for history of acute pulmonary embolism, history of DVT, TALAT on BiPAP, obesity. Last seen in office per October 2023 per Dr. Barnett    Compliance data reviewed reviewed from 3/10/2024 through 4/8/2024, patient is on BiPAP with an inhalation pressure of 16 cm H2O, exhalation pressure of 12 cm H2O, her residual AHI is 1.0.  Patient utilizes a fullface mask.  Her DME provider is [facility]    She continues on Eliquis, had a DVT and pulmonary embolism.  Patient states that she had her COVID-vaccine at 3 PM on the date of the onset of her lower extremity swelling and diagnosis of DVT approximately 7 hours later.  Discussed at length that it is unlikely that it is related to her COVID booster given the timeline, could have been related to her history of falls and potential for decreased activity.  Patient is doing well on Eliquis and has no complaints.  She denies any family history of hypercoagulability.  Denies any significant immobility although she does endorse that she had a couple of falls the month prior.  I do not see that there was any workup evaluation for hypercoagulable states and her blood work in December 2022.  Patient follows with hematology/oncology and with her history of bilateral pulmonary embolism and recurrent DVT unprovoked anticipate that she will be on long-term/lifelong anticoagulation, was adjusted from Xarelto to Eliquis at 2.5 mg twice daily.      Medications:   Eliquis 2.5 mg twice daily  Albuterol HFA      Smoking status:  Lifelong non-smoker      PRIOR WORKUP:  PFT:      CT Imaging:  Echocardiogram 2/17/2023: LVEF greater than 65%.  No signs definitive specific wall motion abnormalities.  Grade 2 diastolic dysfunction.    CT chest 12/17/2022: Scattered filling defects in the left lower lobe segmental branches, bifurcation of the

## 2024-04-11 ENCOUNTER — OFFICE VISIT (OUTPATIENT)
Dept: PULMONOLOGY | Age: 79
End: 2024-04-11
Payer: MEDICARE

## 2024-04-11 ENCOUNTER — TELEPHONE (OUTPATIENT)
Dept: NEUROLOGY | Age: 79
End: 2024-04-11

## 2024-04-11 VITALS
RESPIRATION RATE: 16 BRPM | SYSTOLIC BLOOD PRESSURE: 132 MMHG | HEIGHT: 65 IN | HEART RATE: 53 BPM | TEMPERATURE: 97 F | WEIGHT: 217.3 LBS | OXYGEN SATURATION: 98 % | BODY MASS INDEX: 36.21 KG/M2 | DIASTOLIC BLOOD PRESSURE: 72 MMHG

## 2024-04-11 DIAGNOSIS — Z79.01 CHRONIC ANTICOAGULATION: ICD-10-CM

## 2024-04-11 DIAGNOSIS — I26.99 BILATERAL PULMONARY EMBOLISM (HCC): ICD-10-CM

## 2024-04-11 DIAGNOSIS — G47.33 OSA TREATED WITH BIPAP: Primary | ICD-10-CM

## 2024-04-11 PROCEDURE — 3078F DIAST BP <80 MM HG: CPT | Performed by: NURSE PRACTITIONER

## 2024-04-11 PROCEDURE — 1123F ACP DISCUSS/DSCN MKR DOCD: CPT | Performed by: NURSE PRACTITIONER

## 2024-04-11 PROCEDURE — 3075F SYST BP GE 130 - 139MM HG: CPT | Performed by: NURSE PRACTITIONER

## 2024-04-11 PROCEDURE — 99214 OFFICE O/P EST MOD 30 MIN: CPT | Performed by: NURSE PRACTITIONER

## 2024-04-11 ASSESSMENT — ENCOUNTER SYMPTOMS
GASTROINTESTINAL NEGATIVE: 1
ALLERGIC/IMMUNOLOGIC NEGATIVE: 1
EYES NEGATIVE: 1

## 2024-04-11 NOTE — TELEPHONE ENCOUNTER
Kaye states that she forgot to ask Cindy a question concerning getting the shingles vaccination and wether or not that would interfere with any of the treatment/prescriptions she is currently taking.  She was here today.  Please advice.  Thank You. RR

## 2024-04-12 NOTE — TELEPHONE ENCOUNTER
I called Kaye and informed her that Koby recommends that she proceed with getting the Shingles vaccine as recommended by her PCP. She has no objections with her pulmonary medications.  Koby also noted that she might want to consider getting the RSV vaccine if she has not gotten it and if she is eligible.  She said that her PCP had mentioned that also. Voices understanding and very appreciative of recommendations.

## 2024-05-07 LAB
ALBUMIN: 4.5 G/DL (ref 3.5–5.2)
ALK PHOSPHATASE: 63 U/L (ref 40–142)
ALT SERPL-CCNC: 13 U/L (ref 5–40)
ANION GAP SERPL CALCULATED.3IONS-SCNC: 8 MEQ/L (ref 7–16)
AST SERPL-CCNC: 17 U/L (ref 9–40)
BASOPHILS ABSOLUTE: 0.01 K/UL (ref 0–0.2)
BASOPHILS RELATIVE PERCENT: 0.2 %
BILIRUB SERPL-MCNC: 0.3 MG/DL
BUN BLDV-MCNC: 25 MG/DL (ref 8–23)
CALCIUM SERPL-MCNC: 10.8 MG/DL (ref 8.5–10.5)
CHLORIDE BLD-SCNC: 103 MEQ/L (ref 95–107)
CO2: 33 MEQ/L (ref 19–31)
CREAT SERPL-MCNC: 1.01 MG/DL (ref 0.6–1.3)
EGFR IF NONAFRICAN AMERICAN: 56 ML/MIN/1.73
EOSINOPHILS ABSOLUTE: 0.11 K/UL (ref 0–0.5)
EOSINOPHILS RELATIVE PERCENT: 1.7 %
FERRITIN: 47 NG/ML (ref 13–200)
GLUCOSE: 102 MG/DL (ref 70–99)
HCT VFR BLD CALC: 43.7 % (ref 34–45)
HEMOGLOBIN: 14.8 G/DL (ref 11.5–15.5)
IMMATURE GRANS (ABS): 0.02
IMMATURE GRANULOCYTES %: 0.3 %
IRON % SATURATION: 22 % (ref 20–50)
IRON, SERUM: 75 UG/DL (ref 37–145)
LYMPHOCYTES ABSOLUTE: 1.03 K/UL (ref 1–4)
LYMPHOCYTES RELATIVE PERCENT: 15.7 %
MCH RBC QN AUTO: 30.5 PG (ref 25–33)
MCHC RBC AUTO-ENTMCNC: 33.9 G/DL (ref 31–36)
MCV RBC AUTO: 90.1 FL (ref 80–99)
MONOCYTES ABSOLUTE: 0.58 K/UL (ref 0.2–1)
MONOCYTES RELATIVE PERCENT: 8.8 %
NEUTROPHILS ABSOLUTE: 4.81 K/UL (ref 1.5–7.5)
NEUTROPHILS RELATIVE PERCENT: 73.3 %
PDW BLD-RTO: 13.7 % (ref 11.5–15)
PLATELET # BLD: 199 K/UL (ref 130–400)
PMV BLD AUTO: 9.7 FL (ref 9.3–13)
POTASSIUM SERPL-SCNC: 3.3 MEQ/L (ref 3.5–5.4)
RBC # BLD: 4.85 M/UL (ref 3.8–5.4)
SODIUM BLD-SCNC: 144 MEQ/L (ref 133–146)
TOTAL IRON BINDING CAPACITY: 337 UG/DL (ref 250–450)
TOTAL PROTEIN: 6.6 G/DL (ref 6.1–8.3)
UNSATURATED IRON BINDING CAPACITY: 262 UG/DL (ref 112–347)
WBC # BLD: 6.6 K/UL (ref 3.5–11)

## 2024-05-13 ENCOUNTER — OFFICE VISIT (OUTPATIENT)
Dept: ONCOLOGY | Age: 79
End: 2024-05-13
Payer: MEDICARE

## 2024-05-13 VITALS
DIASTOLIC BLOOD PRESSURE: 75 MMHG | HEART RATE: 55 BPM | TEMPERATURE: 97 F | RESPIRATION RATE: 18 BRPM | WEIGHT: 218 LBS | BODY MASS INDEX: 36.28 KG/M2 | SYSTOLIC BLOOD PRESSURE: 130 MMHG

## 2024-05-13 DIAGNOSIS — D50.0 IRON DEFICIENCY ANEMIA DUE TO CHRONIC BLOOD LOSS: Primary | ICD-10-CM

## 2024-05-13 DIAGNOSIS — I82.519 CHRONIC DEEP VEIN THROMBOSIS (DVT) OF FEMORAL VEIN, UNSPECIFIED LATERALITY (HCC): ICD-10-CM

## 2024-05-13 DIAGNOSIS — K64.4 INTERNAL AND EXTERNAL HEMORRHOIDS WITHOUT COMPLICATION: ICD-10-CM

## 2024-05-13 DIAGNOSIS — K31.811 GASTRIC HEMORRHAGE DUE TO GASTRIC ANTRAL VASCULAR ECTASIA (GAVE): ICD-10-CM

## 2024-05-13 DIAGNOSIS — I27.82 CHRONIC PULMONARY EMBOLISM, UNSPECIFIED PULMONARY EMBOLISM TYPE, UNSPECIFIED WHETHER ACUTE COR PULMONALE PRESENT (HCC): ICD-10-CM

## 2024-05-13 DIAGNOSIS — K64.8 INTERNAL AND EXTERNAL HEMORRHOIDS WITHOUT COMPLICATION: ICD-10-CM

## 2024-05-13 PROCEDURE — 3075F SYST BP GE 130 - 139MM HG: CPT | Performed by: INTERNAL MEDICINE

## 2024-05-13 PROCEDURE — 99214 OFFICE O/P EST MOD 30 MIN: CPT | Performed by: INTERNAL MEDICINE

## 2024-05-13 PROCEDURE — 1123F ACP DISCUSS/DSCN MKR DOCD: CPT | Performed by: INTERNAL MEDICINE

## 2024-05-13 PROCEDURE — 3078F DIAST BP <80 MM HG: CPT | Performed by: INTERNAL MEDICINE

## 2024-05-13 NOTE — PROGRESS NOTES
Patient ID: Kaye Joyce, 1945, [id number], 78 y.o.  Referred by :  No ref. provider found   Reason for consultation: Liver lesion/recurrent thromboembolism/iron deficient anemia      HISTORY OF PRESENT ILLNESS:    Oncologic History:    Kaye Joyce is a very pleasant 78 y.o. female.  Referred for recurrent pulmonary embolism liver lesion and iron deficiency anemia rule out malignancy  Dorie was diagnosed with right upper extremity DVT on April 2022 which was found when she had a mammogram done and it was apparently an incidental finding she was referred to vascular surgery and she was seen by Dr. Collazo at that time she had received anticoagulation with Xarelto for about 2 to 3 months according to patient it was discontinue.  On 12/17/2022 she presented to emergency room at that time with right lower extremity swelling pain and redness at that time she was found to have acute DVT which involved right femoral-popliteal tibial and peroneal veins.  Patient also had a CTA chest done at that time and CT chest was significant for bilateral pulm embolism left upper/lower lobe segmental/subsegmental and right pulmonary artery segmental branches pulm embolism.  She was also found to have a right hepatic about 3.1 cm exophytic posterior inferior right lower lobe of liver hypodensity/mass and MRI of the liver was recommended at that time.  Patient at that time was admitted to hospital for 3 days started on anticoagulation and currently she is on Xarelto.  CBC compatible with microcytic anemia and patient does have hemorrhoids but no active bleed  Anemia work-up compatible of iron deficiency anemia  CEA in the normal limit  Hesitant to have endoscopy and colonoscopy however scheduled for June  Scan of the abdomen and MRI of the liver> hemorrhagic cyst in the kidney and cyst in the liver    Hematology treatment  IV iron  Xarelto which changed to Eliquis due to untreated GI bleed at a dose of 2.5 twice daily  GI

## 2024-06-21 ENCOUNTER — HOSPITAL ENCOUNTER (OUTPATIENT)
Dept: GENERAL RADIOLOGY | Age: 79
End: 2024-06-21
Payer: MEDICARE

## 2024-06-21 ENCOUNTER — HOSPITAL ENCOUNTER (OUTPATIENT)
Age: 79
End: 2024-06-21
Payer: MEDICARE

## 2024-06-21 DIAGNOSIS — R05.1 ACUTE COUGH: ICD-10-CM

## 2024-06-21 DIAGNOSIS — J20.9 ACUTE BRONCHITIS, UNSPECIFIED ORGANISM: ICD-10-CM

## 2024-06-21 PROCEDURE — 71046 X-RAY EXAM CHEST 2 VIEWS: CPT

## 2024-06-27 ENCOUNTER — APPOINTMENT (OUTPATIENT)
Dept: CT IMAGING | Age: 79
DRG: 563 | End: 2024-06-27
Payer: MEDICARE

## 2024-06-27 ENCOUNTER — HOSPITAL ENCOUNTER (INPATIENT)
Age: 79
LOS: 10 days | Discharge: NURSING, REHAB OR LONG-TERM CARE | DRG: 563 | End: 2024-07-08
Attending: EMERGENCY MEDICINE | Admitting: INTERNAL MEDICINE
Payer: MEDICARE

## 2024-06-27 ENCOUNTER — APPOINTMENT (OUTPATIENT)
Dept: GENERAL RADIOLOGY | Age: 79
DRG: 563 | End: 2024-06-27
Payer: MEDICARE

## 2024-06-27 ENCOUNTER — APPOINTMENT (OUTPATIENT)
Dept: VASCULAR LAB | Age: 79
DRG: 563 | End: 2024-06-27
Attending: EMERGENCY MEDICINE
Payer: MEDICARE

## 2024-06-27 DIAGNOSIS — E83.42 HYPOMAGNESEMIA: ICD-10-CM

## 2024-06-27 DIAGNOSIS — S42.214D CLOSED NONDISPLACED FRACTURE OF SURGICAL NECK OF RIGHT HUMERUS WITH ROUTINE HEALING, UNSPECIFIED FRACTURE MORPHOLOGY, SUBSEQUENT ENCOUNTER: ICD-10-CM

## 2024-06-27 DIAGNOSIS — N39.0 URINARY TRACT INFECTION WITHOUT HEMATURIA, SITE UNSPECIFIED: Primary | ICD-10-CM
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
## 2024-06-27 PROBLEM — D68.69 SECONDARY HYPERCOAGULABLE STATE (HCC): Status: ACTIVE | Noted: 2024-06-27

## 2024-06-27 PROBLEM — G47.33 OSA (OBSTRUCTIVE SLEEP APNEA): Status: ACTIVE | Noted: 2024-06-27

## 2024-06-27 LAB
ALBUMIN SERPL-MCNC: 3.8 G/DL (ref 3.5–5.2)
ALBUMIN/GLOB SERPL: 1.5 {RATIO} (ref 1–2.5)
ALP SERPL-CCNC: 56 U/L (ref 35–104)
ALT SERPL-CCNC: 23 U/L (ref 5–33)
ANION GAP SERPL CALCULATED.3IONS-SCNC: 9 MMOL/L (ref 9–17)
AST SERPL-CCNC: 22 U/L
BACTERIA URNS QL MICRO: ABNORMAL
BASOPHILS # BLD: 0.04 K/UL (ref 0–0.2)
BASOPHILS NFR BLD: 0 % (ref 0–2)
BILIRUB SERPL-MCNC: 0.4 MG/DL (ref 0.3–1.2)
BILIRUB UR QL STRIP: NEGATIVE
BUN SERPL-MCNC: 19 MG/DL (ref 8–23)
BUN/CREAT SERPL: 21 (ref 9–20)
CALCIUM SERPL-MCNC: 10.2 MG/DL (ref 8.6–10.4)
CHLORIDE SERPL-SCNC: 101 MMOL/L (ref 98–107)
CLARITY UR: ABNORMAL
CO2 SERPL-SCNC: 31 MMOL/L (ref 20–31)
COLOR UR: YELLOW
CREAT SERPL-MCNC: 0.9 MG/DL (ref 0.5–0.9)
EKG ATRIAL RATE: 53 BPM
EKG P AXIS: 80 DEGREES
EKG P-R INTERVAL: 270 MS
EKG Q-T INTERVAL: 418 MS
EKG QRS DURATION: 86 MS
EKG QTC CALCULATION (BAZETT): 392 MS
EKG R AXIS: -31 DEGREES
EKG T AXIS: 55 DEGREES
EKG VENTRICULAR RATE: 53 BPM
EOSINOPHIL # BLD: 0.08 K/UL (ref 0–0.44)
EOSINOPHILS RELATIVE PERCENT: 1 % (ref 1–4)
EPI CELLS #/AREA URNS HPF: ABNORMAL /HPF (ref 0–25)
ERYTHROCYTE [DISTWIDTH] IN BLOOD BY AUTOMATED COUNT: 13.7 % (ref 11.8–14.4)
GFR, ESTIMATED: 65 ML/MIN/1.73M2
GLUCOSE SERPL-MCNC: 99 MG/DL (ref 70–99)
GLUCOSE UR STRIP-MCNC: NEGATIVE MG/DL
HCT VFR BLD AUTO: 42.9 % (ref 36.3–47.1)
HGB BLD-MCNC: 14.7 G/DL (ref 11.9–15.1)
HGB UR QL STRIP.AUTO: NEGATIVE
IMM GRANULOCYTES # BLD AUTO: 0.09 K/UL (ref 0–0.3)
IMM GRANULOCYTES NFR BLD: 1 %
INR PPP: 1.1
KETONES UR STRIP-MCNC: NEGATIVE MG/DL
LEUKOCYTE ESTERASE UR QL STRIP: ABNORMAL
LYMPHOCYTES NFR BLD: 0.83 K/UL (ref 1.1–3.7)
LYMPHOCYTES RELATIVE PERCENT: 6 % (ref 24–43)
MAGNESIUM SERPL-MCNC: 1.2 MG/DL (ref 1.6–2.6)
MCH RBC QN AUTO: 31.1 PG (ref 25.2–33.5)
MCHC RBC AUTO-ENTMCNC: 34.3 G/DL (ref 28.4–34.8)
MCV RBC AUTO: 90.7 FL (ref 82.6–102.9)
MONOCYTES NFR BLD: 0.9 K/UL (ref 0.1–1.2)
MONOCYTES NFR BLD: 7 % (ref 3–12)
NEUTROPHILS NFR BLD: 85 % (ref 36–65)
NEUTS SEG NFR BLD: 11.53 K/UL (ref 1.5–8.1)
NITRITE UR QL STRIP: POSITIVE
NRBC BLD-RTO: 0 PER 100 WBC
PARTIAL THROMBOPLASTIN TIME: 23.6 SEC (ref 26.8–34.8)
PH UR STRIP: 7 [PH] (ref 5–9)
PLATELET # BLD AUTO: 195 K/UL (ref 138–453)
PMV BLD AUTO: 9.3 FL (ref 8.1–13.5)
POTASSIUM SERPL-SCNC: 3.3 MMOL/L (ref 3.7–5.3)
PROT SERPL-MCNC: 6.4 G/DL (ref 6.4–8.3)
PROT UR STRIP-MCNC: NEGATIVE MG/DL
PROTHROMBIN TIME: 14.2 SEC (ref 11.7–14.1)
RBC # BLD AUTO: 4.73 M/UL (ref 3.95–5.11)
RBC #/AREA URNS HPF: ABNORMAL /HPF (ref 0–2)
RENAL EPITHELIAL, UA: ABNORMAL /HPF
SODIUM SERPL-SCNC: 141 MMOL/L (ref 135–144)
SP GR UR STRIP: 1.01 (ref 1.01–1.02)
TROPONIN I SERPL HS-MCNC: 25 NG/L (ref 0–14)
TROPONIN I SERPL HS-MCNC: 25 NG/L (ref 0–14)
UROBILINOGEN UR STRIP-ACNC: NORMAL EU/DL (ref 0–1)
WBC #/AREA URNS HPF: ABNORMAL /HPF (ref 0–5)
WBC OTHER # BLD: 13.5 K/UL (ref 3.5–11.3)

## 2024-06-27 PROCEDURE — 73200 CT UPPER EXTREMITY W/O DYE: CPT

## 2024-06-27 PROCEDURE — 96368 THER/DIAG CONCURRENT INF: CPT

## 2024-06-27 PROCEDURE — 6370000000 HC RX 637 (ALT 250 FOR IP)

## 2024-06-27 PROCEDURE — 6360000002 HC RX W HCPCS: Performed by: EMERGENCY MEDICINE

## 2024-06-27 PROCEDURE — 94664 DEMO&/EVAL PT USE INHALER: CPT

## 2024-06-27 PROCEDURE — 70450 CT HEAD/BRAIN W/O DYE: CPT

## 2024-06-27 PROCEDURE — 6370000000 HC RX 637 (ALT 250 FOR IP): Performed by: INTERNAL MEDICINE

## 2024-06-27 PROCEDURE — 2580000003 HC RX 258: Performed by: EMERGENCY MEDICINE

## 2024-06-27 PROCEDURE — 83735 ASSAY OF MAGNESIUM: CPT

## 2024-06-27 PROCEDURE — 84484 ASSAY OF TROPONIN QUANT: CPT

## 2024-06-27 PROCEDURE — 85025 COMPLETE CBC W/AUTO DIFF WBC: CPT

## 2024-06-27 PROCEDURE — 93971 EXTREMITY STUDY: CPT | Performed by: SURGERY

## 2024-06-27 PROCEDURE — 96366 THER/PROPH/DIAG IV INF ADDON: CPT

## 2024-06-27 PROCEDURE — 93005 ELECTROCARDIOGRAM TRACING: CPT | Performed by: EMERGENCY MEDICINE

## 2024-06-27 PROCEDURE — 93971 EXTREMITY STUDY: CPT

## 2024-06-27 PROCEDURE — 85730 THROMBOPLASTIN TIME PARTIAL: CPT

## 2024-06-27 PROCEDURE — 73030 X-RAY EXAM OF SHOULDER: CPT

## 2024-06-27 PROCEDURE — 96365 THER/PROPH/DIAG IV INF INIT: CPT

## 2024-06-27 PROCEDURE — 81001 URINALYSIS AUTO W/SCOPE: CPT

## 2024-06-27 PROCEDURE — 87086 URINE CULTURE/COLONY COUNT: CPT

## 2024-06-27 PROCEDURE — 85610 PROTHROMBIN TIME: CPT

## 2024-06-27 PROCEDURE — G0378 HOSPITAL OBSERVATION PER HR: HCPCS

## 2024-06-27 PROCEDURE — 99285 EMERGENCY DEPT VISIT HI MDM: CPT

## 2024-06-27 PROCEDURE — 72125 CT NECK SPINE W/O DYE: CPT

## 2024-06-27 PROCEDURE — 6370000000 HC RX 637 (ALT 250 FOR IP): Performed by: EMERGENCY MEDICINE

## 2024-06-27 PROCEDURE — 94761 N-INVAS EAR/PLS OXIMETRY MLT: CPT

## 2024-06-27 PROCEDURE — 93010 ELECTROCARDIOGRAM REPORT: CPT | Performed by: INTERNAL MEDICINE

## 2024-06-27 PROCEDURE — 71045 X-RAY EXAM CHEST 1 VIEW: CPT

## 2024-06-27 PROCEDURE — 80053 COMPREHEN METABOLIC PANEL: CPT

## 2024-06-27 PROCEDURE — 2580000003 HC RX 258: Performed by: INTERNAL MEDICINE

## 2024-06-27 RX ORDER — PANTOPRAZOLE SODIUM 40 MG/1
40 TABLET, DELAYED RELEASE ORAL
Status: DISCONTINUED | OUTPATIENT
Start: 2024-06-28 | End: 2024-07-08 | Stop reason: HOSPADM

## 2024-06-27 RX ORDER — CALCIUM CARBONATE 500 MG/1
1 TABLET, CHEWABLE ORAL 2 TIMES DAILY
Status: DISCONTINUED | OUTPATIENT
Start: 2024-06-27 | End: 2024-07-01

## 2024-06-27 RX ORDER — CHLORTHALIDONE 25 MG/1
25 TABLET ORAL DAILY
Status: DISCONTINUED | OUTPATIENT
Start: 2024-06-27 | End: 2024-07-08 | Stop reason: HOSPADM

## 2024-06-27 RX ORDER — MAGNESIUM SULFATE IN WATER 40 MG/ML
2000 INJECTION, SOLUTION INTRAVENOUS PRN
Status: DISCONTINUED | OUTPATIENT
Start: 2024-06-27 | End: 2024-07-08 | Stop reason: HOSPADM

## 2024-06-27 RX ORDER — SODIUM CHLORIDE 0.9 % (FLUSH) 0.9 %
5-40 SYRINGE (ML) INJECTION EVERY 12 HOURS SCHEDULED
Status: DISCONTINUED | OUTPATIENT
Start: 2024-06-27 | End: 2024-07-08 | Stop reason: HOSPADM

## 2024-06-27 RX ORDER — SODIUM CHLORIDE 9 MG/ML
INJECTION, SOLUTION INTRAVENOUS PRN
Status: DISCONTINUED | OUTPATIENT
Start: 2024-06-27 | End: 2024-07-08 | Stop reason: HOSPADM

## 2024-06-27 RX ORDER — LEVOFLOXACIN 500 MG/1
500 TABLET, FILM COATED ORAL DAILY
Status: DISCONTINUED | OUTPATIENT
Start: 2024-06-27 | End: 2024-06-29

## 2024-06-27 RX ORDER — ONDANSETRON 2 MG/ML
4 INJECTION INTRAMUSCULAR; INTRAVENOUS EVERY 6 HOURS PRN
Status: DISCONTINUED | OUTPATIENT
Start: 2024-06-27 | End: 2024-07-08 | Stop reason: HOSPADM

## 2024-06-27 RX ORDER — POLYETHYLENE GLYCOL 3350 17 G/17G
17 POWDER, FOR SOLUTION ORAL DAILY PRN
Status: DISCONTINUED | OUTPATIENT
Start: 2024-06-27 | End: 2024-07-08 | Stop reason: HOSPADM

## 2024-06-27 RX ORDER — ALBUTEROL SULFATE 90 UG/1
1 AEROSOL, METERED RESPIRATORY (INHALATION) EVERY 6 HOURS PRN
Status: DISCONTINUED | OUTPATIENT
Start: 2024-06-27 | End: 2024-07-08 | Stop reason: HOSPADM

## 2024-06-27 RX ORDER — OXYBUTYNIN CHLORIDE 5 MG/1
15 TABLET, EXTENDED RELEASE ORAL DAILY
Status: DISCONTINUED | OUTPATIENT
Start: 2024-06-27 | End: 2024-07-08 | Stop reason: HOSPADM

## 2024-06-27 RX ORDER — ONDANSETRON 4 MG/1
4 TABLET, ORALLY DISINTEGRATING ORAL EVERY 8 HOURS PRN
Status: DISCONTINUED | OUTPATIENT
Start: 2024-06-27 | End: 2024-07-08 | Stop reason: HOSPADM

## 2024-06-27 RX ORDER — POTASSIUM CHLORIDE 20 MEQ/1
40 TABLET, EXTENDED RELEASE ORAL PRN
Status: DISCONTINUED | OUTPATIENT
Start: 2024-06-27 | End: 2024-07-05

## 2024-06-27 RX ORDER — HYDROCODONE BITARTRATE AND ACETAMINOPHEN 5; 325 MG/1; MG/1
1 TABLET ORAL ONCE
Status: COMPLETED | OUTPATIENT
Start: 2024-06-27 | End: 2024-06-27

## 2024-06-27 RX ORDER — HYDROCODONE BITARTRATE AND ACETAMINOPHEN 5; 325 MG/1; MG/1
2 TABLET ORAL EVERY 4 HOURS PRN
Status: DISCONTINUED | OUTPATIENT
Start: 2024-06-27 | End: 2024-07-06

## 2024-06-27 RX ORDER — ATENOLOL 50 MG/1
100 TABLET ORAL DAILY
Status: DISCONTINUED | OUTPATIENT
Start: 2024-06-27 | End: 2024-07-08 | Stop reason: HOSPADM

## 2024-06-27 RX ORDER — SODIUM CHLORIDE 0.9 % (FLUSH) 0.9 %
10 SYRINGE (ML) INJECTION PRN
Status: DISCONTINUED | OUTPATIENT
Start: 2024-06-27 | End: 2024-07-08 | Stop reason: HOSPADM

## 2024-06-27 RX ORDER — ATENOLOL AND CHLORTHALIDONE TABLET 100; 25 MG/1; MG/1
1 TABLET ORAL DAILY
Status: DISCONTINUED | OUTPATIENT
Start: 2024-06-27 | End: 2024-06-27

## 2024-06-27 RX ORDER — MAGNESIUM SULFATE IN WATER 40 MG/ML
2000 INJECTION, SOLUTION INTRAVENOUS ONCE
Status: COMPLETED | OUTPATIENT
Start: 2024-06-27 | End: 2024-06-27

## 2024-06-27 RX ORDER — HYDROCODONE BITARTRATE AND ACETAMINOPHEN 5; 325 MG/1; MG/1
1 TABLET ORAL EVERY 4 HOURS PRN
Status: DISCONTINUED | OUTPATIENT
Start: 2024-06-27 | End: 2024-07-06

## 2024-06-27 RX ORDER — ACETAMINOPHEN 325 MG/1
650 TABLET ORAL EVERY 6 HOURS PRN
Status: DISCONTINUED | OUTPATIENT
Start: 2024-06-27 | End: 2024-07-08 | Stop reason: HOSPADM

## 2024-06-27 RX ORDER — POTASSIUM CHLORIDE 20 MEQ/1
20 TABLET, EXTENDED RELEASE ORAL ONCE
Status: COMPLETED | OUTPATIENT
Start: 2024-06-27 | End: 2024-06-27

## 2024-06-27 RX ORDER — AZITHROMYCIN 250 MG/1
250 TABLET, FILM COATED ORAL DAILY
Status: DISCONTINUED | OUTPATIENT
Start: 2024-06-28 | End: 2024-06-29

## 2024-06-27 RX ORDER — FERROUS SULFATE 325(65) MG
325 TABLET ORAL
Status: DISCONTINUED | OUTPATIENT
Start: 2024-06-28 | End: 2024-07-01

## 2024-06-27 RX ORDER — ASCORBIC ACID 500 MG
500 TABLET ORAL DAILY
Status: DISCONTINUED | OUTPATIENT
Start: 2024-06-27 | End: 2024-07-08 | Stop reason: HOSPADM

## 2024-06-27 RX ORDER — POTASSIUM CHLORIDE 7.45 MG/ML
10 INJECTION INTRAVENOUS PRN
Status: DISCONTINUED | OUTPATIENT
Start: 2024-06-27 | End: 2024-07-05

## 2024-06-27 RX ORDER — ACETAMINOPHEN 650 MG/1
650 SUPPOSITORY RECTAL EVERY 6 HOURS PRN
Status: DISCONTINUED | OUTPATIENT
Start: 2024-06-27 | End: 2024-07-08 | Stop reason: HOSPADM

## 2024-06-27 RX ADMIN — MAGNESIUM SULFATE IN WATER 2000 MG: 40 INJECTION, SOLUTION INTRAVENOUS at 17:14

## 2024-06-27 RX ADMIN — CEFTRIAXONE SODIUM 1000 MG: 1 INJECTION, POWDER, FOR SOLUTION INTRAMUSCULAR; INTRAVENOUS at 19:49

## 2024-06-27 RX ADMIN — HYDROCODONE BITARTRATE AND ACETAMINOPHEN 2 TABLET: 5; 325 TABLET ORAL at 23:06

## 2024-06-27 RX ADMIN — POTASSIUM CHLORIDE 20 MEQ: 1500 TABLET, EXTENDED RELEASE ORAL at 17:12

## 2024-06-27 RX ADMIN — SODIUM CHLORIDE, PRESERVATIVE FREE 10 ML: 5 INJECTION INTRAVENOUS at 20:13

## 2024-06-27 RX ADMIN — HYDROCODONE BITARTRATE AND ACETAMINOPHEN 1 TABLET: 5; 325 TABLET ORAL at 17:12

## 2024-06-27 RX ADMIN — ACETAMINOPHEN 650 MG: 325 TABLET ORAL at 20:13

## 2024-06-27 RX ADMIN — ANTACID TABLETS 500 MG: 500 TABLET, CHEWABLE ORAL at 20:13

## 2024-06-27 RX ADMIN — LEVOFLOXACIN 500 MG: 500 TABLET, FILM COATED ORAL at 20:13

## 2024-06-27 RX ADMIN — CHLORTHALIDONE 25 MG: 25 TABLET ORAL at 20:13

## 2024-06-27 ASSESSMENT — PAIN DESCRIPTION - FREQUENCY: FREQUENCY: CONTINUOUS

## 2024-06-27 ASSESSMENT — PAIN DESCRIPTION - LOCATION
LOCATION: SHOULDER
LOCATION: SHOULDER

## 2024-06-27 ASSESSMENT — PAIN DESCRIPTION - ONSET: ONSET: ON-GOING

## 2024-06-27 ASSESSMENT — PAIN DESCRIPTION - ORIENTATION
ORIENTATION: RIGHT
ORIENTATION: LEFT

## 2024-06-27 ASSESSMENT — LIFESTYLE VARIABLES
HOW MANY STANDARD DRINKS CONTAINING ALCOHOL DO YOU HAVE ON A TYPICAL DAY: PATIENT DOES NOT DRINK
HOW OFTEN DO YOU HAVE A DRINK CONTAINING ALCOHOL: NEVER

## 2024-06-27 ASSESSMENT — PAIN SCALES - GENERAL
PAINLEVEL_OUTOF10: 7
PAINLEVEL_OUTOF10: 10
PAINLEVEL_OUTOF10: 4
PAINLEVEL_OUTOF10: 4

## 2024-06-27 ASSESSMENT — PAIN DESCRIPTION - PAIN TYPE: TYPE: ACUTE PAIN

## 2024-06-27 ASSESSMENT — PAIN DESCRIPTION - DESCRIPTORS
DESCRIPTORS: ACHING
DESCRIPTORS: ACHING

## 2024-06-27 ASSESSMENT — PAIN - FUNCTIONAL ASSESSMENT: PAIN_FUNCTIONAL_ASSESSMENT: PREVENTS OR INTERFERES WITH MANY ACTIVE NOT PASSIVE ACTIVITIES

## 2024-06-27 NOTE — PROGRESS NOTES
Pt arrives to room 312. A/O x4 with limited movement to rue and lle. VS and assessment complete. Discussed poc. Bed alarm on and  call light in reach.

## 2024-06-27 NOTE — ED PROVIDER NOTES
[facility] ED  EMERGENCY DEPARTMENT ENCOUNTER      Pt Name: Kaye Joyce  MRN: [id number]  Birthdate 1945  Date of evaluation: 6/27/2024  Provider: Leslie Torres DO    CHIEF COMPLAINT       Chief Complaint   Patient presents with    Fall     Pt fell while trying to get up from chair d/t fatigue/weakness.  C/O right shoulder pain, hematoma to right side head, and left leg pain-ongoing for few days-unable to bear weight on left leg, denies LOC, on Eliquis.              HISTORY OF PRESENT ILLNESS   (Location/Symptom, Timing/Onset, Context/Setting, Quality, Duration, Modifying Factors, Severity)  Note limiting factors.   Kaye Joyce is a 78 y.o. female who presents to the emergency department      Kaye is an 78-year-old female who presents via EMS from home after a fall.  The patient states that she felt off balance and fell on her right side onto carpet.  She did not hit her head.  No loss of consciousness but the patient is on chronic Eliquis with a prior DVT in her right lower extremity.  Patient lives alone and ambulates with a cane which she had in her hand during this event.  She states prior to the event she had global fatigue and weakness.  She denies any dizziness or chest pain before the fall.  Is having right shoulder pain.  The patient also states that her left leg has been painful and not working correctly for the last few days.  She did have plans to go and get it evaluated before the fall today.          Nursing Notes were reviewed.    REVIEW OF SYSTEMS    (2-9 systems for level 4, 10 or more for level 5)     Review of Systems   Musculoskeletal:  Positive for arthralgias.       Except as noted above the remainder of the review of systems was reviewed and negative.       PAST MEDICAL HISTORY     Past Medical History:   Diagnosis Date    Arthritis     Blood clot in vein 04/2022    on Xarelto for two months per patient    Brain bleed (HCC) 1983    Cataract     DVT (deep venous thrombosis)

## 2024-06-28 PROBLEM — S42.214D CLOSED NONDISPLACED FRACTURE OF SURGICAL NECK OF RIGHT HUMERUS WITH ROUTINE HEALING: Status: ACTIVE | Noted: 2022-01-11

## 2024-06-28 LAB
ALBUMIN SERPL-MCNC: 3.4 G/DL (ref 3.5–5.2)
ALBUMIN/GLOB SERPL: 1.4 {RATIO} (ref 1–2.5)
ALP SERPL-CCNC: 52 U/L (ref 35–104)
ALT SERPL-CCNC: 18 U/L (ref 5–33)
ANION GAP SERPL CALCULATED.3IONS-SCNC: 6 MMOL/L (ref 9–17)
AST SERPL-CCNC: 20 U/L
BASOPHILS # BLD: <0.03 K/UL (ref 0–0.2)
BASOPHILS NFR BLD: 0 % (ref 0–2)
BILIRUB SERPL-MCNC: 0.4 MG/DL (ref 0.3–1.2)
BUN SERPL-MCNC: 20 MG/DL (ref 8–23)
BUN/CREAT SERPL: 20 (ref 9–20)
CALCIUM SERPL-MCNC: 9.9 MG/DL (ref 8.6–10.4)
CHLORIDE SERPL-SCNC: 102 MMOL/L (ref 98–107)
CO2 SERPL-SCNC: 30 MMOL/L (ref 20–31)
CREAT SERPL-MCNC: 1 MG/DL (ref 0.5–0.9)
EOSINOPHIL # BLD: 0.13 K/UL (ref 0–0.44)
EOSINOPHILS RELATIVE PERCENT: 2 % (ref 1–4)
ERYTHROCYTE [DISTWIDTH] IN BLOOD BY AUTOMATED COUNT: 14 % (ref 11.8–14.4)
GFR, ESTIMATED: 57 ML/MIN/1.73M2
GLUCOSE SERPL-MCNC: 97 MG/DL (ref 70–99)
HCT VFR BLD AUTO: 41.2 % (ref 36.3–47.1)
HGB BLD-MCNC: 13.7 G/DL (ref 11.9–15.1)
IMM GRANULOCYTES # BLD AUTO: 0.04 K/UL (ref 0–0.3)
IMM GRANULOCYTES NFR BLD: 1 %
LYMPHOCYTES NFR BLD: 1.02 K/UL (ref 1.1–3.7)
LYMPHOCYTES RELATIVE PERCENT: 12 % (ref 24–43)
MCH RBC QN AUTO: 30.4 PG (ref 25.2–33.5)
MCHC RBC AUTO-ENTMCNC: 33.3 G/DL (ref 28.4–34.8)
MCV RBC AUTO: 91.6 FL (ref 82.6–102.9)
MICROORGANISM SPEC CULT: NORMAL
MONOCYTES NFR BLD: 0.82 K/UL (ref 0.1–1.2)
MONOCYTES NFR BLD: 9 % (ref 3–12)
NEUTROPHILS NFR BLD: 77 % (ref 36–65)
NEUTS SEG NFR BLD: 6.69 K/UL (ref 1.5–8.1)
NRBC BLD-RTO: 0 PER 100 WBC
PLATELET # BLD AUTO: 177 K/UL (ref 138–453)
PMV BLD AUTO: 9.6 FL (ref 8.1–13.5)
POTASSIUM SERPL-SCNC: 3.6 MMOL/L (ref 3.7–5.3)
PROT SERPL-MCNC: 5.8 G/DL (ref 6.4–8.3)
RBC # BLD AUTO: 4.5 M/UL (ref 3.95–5.11)
SODIUM SERPL-SCNC: 138 MMOL/L (ref 135–144)
SPECIMEN DESCRIPTION: NORMAL
WBC OTHER # BLD: 8.7 K/UL (ref 3.5–11.3)

## 2024-06-28 PROCEDURE — 80053 COMPREHEN METABOLIC PANEL: CPT

## 2024-06-28 PROCEDURE — 6370000000 HC RX 637 (ALT 250 FOR IP)

## 2024-06-28 PROCEDURE — 2580000003 HC RX 258: Performed by: INTERNAL MEDICINE

## 2024-06-28 PROCEDURE — 36415 COLL VENOUS BLD VENIPUNCTURE: CPT

## 2024-06-28 PROCEDURE — 94761 N-INVAS EAR/PLS OXIMETRY MLT: CPT

## 2024-06-28 PROCEDURE — 97110 THERAPEUTIC EXERCISES: CPT

## 2024-06-28 PROCEDURE — 97116 GAIT TRAINING THERAPY: CPT

## 2024-06-28 PROCEDURE — 97162 PT EVAL MOD COMPLEX 30 MIN: CPT

## 2024-06-28 PROCEDURE — 6370000000 HC RX 637 (ALT 250 FOR IP): Performed by: INTERNAL MEDICINE

## 2024-06-28 PROCEDURE — 85025 COMPLETE CBC W/AUTO DIFF WBC: CPT

## 2024-06-28 PROCEDURE — 1200000000 HC SEMI PRIVATE

## 2024-06-28 RX ORDER — TIZANIDINE 4 MG/1
2 TABLET ORAL EVERY 6 HOURS PRN
Status: DISCONTINUED | OUTPATIENT
Start: 2024-06-28 | End: 2024-07-08 | Stop reason: HOSPADM

## 2024-06-28 RX ORDER — PREDNISONE 5 MG/1
5 TABLET ORAL 2 TIMES DAILY
Status: DISCONTINUED | OUTPATIENT
Start: 2024-06-28 | End: 2024-07-06

## 2024-06-28 RX ORDER — PREDNISONE 5 MG/1
5 TABLET ORAL 2 TIMES DAILY
COMMUNITY

## 2024-06-28 RX ADMIN — TIZANIDINE 2 MG: 4 TABLET ORAL at 02:14

## 2024-06-28 RX ADMIN — SODIUM CHLORIDE, PRESERVATIVE FREE 10 ML: 5 INJECTION INTRAVENOUS at 08:22

## 2024-06-28 RX ADMIN — AZITHROMYCIN DIHYDRATE 250 MG: 250 TABLET, FILM COATED ORAL at 08:17

## 2024-06-28 RX ADMIN — SODIUM CHLORIDE, PRESERVATIVE FREE 10 ML: 5 INJECTION INTRAVENOUS at 19:00

## 2024-06-28 RX ADMIN — SERTRALINE HYDROCHLORIDE 50 MG: 50 TABLET ORAL at 08:17

## 2024-06-28 RX ADMIN — PANTOPRAZOLE SODIUM 40 MG: 40 TABLET, DELAYED RELEASE ORAL at 08:17

## 2024-06-28 RX ADMIN — FERROUS SULFATE TAB 325 MG (65 MG ELEMENTAL FE) 325 MG: 325 (65 FE) TAB at 08:17

## 2024-06-28 RX ADMIN — HYDROCODONE BITARTRATE AND ACETAMINOPHEN 2 TABLET: 5; 325 TABLET ORAL at 09:30

## 2024-06-28 RX ADMIN — OXYBUTYNIN CHLORIDE 15 MG: 5 TABLET, EXTENDED RELEASE ORAL at 08:16

## 2024-06-28 RX ADMIN — HYDROCODONE BITARTRATE AND ACETAMINOPHEN 2 TABLET: 5; 325 TABLET ORAL at 14:16

## 2024-06-28 RX ADMIN — POLYETHYLENE GLYCOL 3350 17 G: 17 POWDER, FOR SOLUTION ORAL at 09:31

## 2024-06-28 RX ADMIN — CHLORTHALIDONE 25 MG: 25 TABLET ORAL at 08:16

## 2024-06-28 RX ADMIN — PREDNISONE 5 MG: 5 TABLET ORAL at 19:39

## 2024-06-28 RX ADMIN — ANTACID TABLETS 500 MG: 500 TABLET, CHEWABLE ORAL at 16:58

## 2024-06-28 RX ADMIN — LEVOFLOXACIN 500 MG: 500 TABLET, FILM COATED ORAL at 08:17

## 2024-06-28 RX ADMIN — HYDROCODONE BITARTRATE AND ACETAMINOPHEN 2 TABLET: 5; 325 TABLET ORAL at 18:54

## 2024-06-28 RX ADMIN — APIXABAN 2.5 MG: 2.5 TABLET, FILM COATED ORAL at 21:00

## 2024-06-28 RX ADMIN — HYDROCODONE BITARTRATE AND ACETAMINOPHEN 2 TABLET: 5; 325 TABLET ORAL at 04:29

## 2024-06-28 RX ADMIN — OXYCODONE HYDROCHLORIDE AND ACETAMINOPHEN 500 MG: 500 TABLET ORAL at 08:16

## 2024-06-28 RX ADMIN — ANTACID TABLETS 500 MG: 500 TABLET, CHEWABLE ORAL at 08:16

## 2024-06-28 RX ADMIN — ATENOLOL 100 MG: 50 TABLET ORAL at 08:16

## 2024-06-28 ASSESSMENT — PAIN SCALES - GENERAL
PAINLEVEL_OUTOF10: 7
PAINLEVEL_OUTOF10: 7
PAINLEVEL_OUTOF10: 8
PAINLEVEL_OUTOF10: 0
PAINLEVEL_OUTOF10: 10
PAINLEVEL_OUTOF10: 8
PAINLEVEL_OUTOF10: 8
PAINLEVEL_OUTOF10: 0
PAINLEVEL_OUTOF10: 3

## 2024-06-28 ASSESSMENT — PAIN DESCRIPTION - FREQUENCY
FREQUENCY: CONTINUOUS

## 2024-06-28 ASSESSMENT — PAIN DESCRIPTION - LOCATION
LOCATION: LEG
LOCATION: SHOULDER
LOCATION: SHOULDER;LEG
LOCATION: LEG

## 2024-06-28 ASSESSMENT — PAIN DESCRIPTION - DESCRIPTORS
DESCRIPTORS: ACHING
DESCRIPTORS: ACHING
DESCRIPTORS: THROBBING
DESCRIPTORS: ACHING

## 2024-06-28 ASSESSMENT — PAIN DESCRIPTION - ONSET
ONSET: ON-GOING

## 2024-06-28 ASSESSMENT — PAIN DESCRIPTION - ORIENTATION
ORIENTATION: RIGHT
ORIENTATION: LEFT
ORIENTATION: RIGHT;LEFT
ORIENTATION: LEFT

## 2024-06-28 ASSESSMENT — PAIN DESCRIPTION - PAIN TYPE
TYPE: ACUTE PAIN

## 2024-06-28 ASSESSMENT — PAIN - FUNCTIONAL ASSESSMENT
PAIN_FUNCTIONAL_ASSESSMENT: ACTIVITIES ARE NOT PREVENTED
PAIN_FUNCTIONAL_ASSESSMENT: ACTIVITIES ARE NOT PREVENTED
PAIN_FUNCTIONAL_ASSESSMENT: PREVENTS OR INTERFERES WITH MANY ACTIVE NOT PASSIVE ACTIVITIES
PAIN_FUNCTIONAL_ASSESSMENT: ACTIVITIES ARE NOT PREVENTED

## 2024-06-28 NOTE — PROGRESS NOTES
Comprehensive Nutrition Assessment    Type and Reason for Visit:  Initial    Nutrition Recommendations/Plan:   Continue current diet.      Malnutrition Assessment:  Malnutrition Status:  At risk for malnutrition (Comment) (06/28/24 1040)    Context:  Acute Illness     Findings of the 6 clinical characteristics of malnutrition:  Energy Intake:  Mild decrease in energy intake (Comment)  Weight Loss:  No significant weight loss     Body Fat Loss:  No significant body fat loss     Muscle Mass Loss:  No significant muscle mass loss    Fluid Accumulation:  No significant fluid accumulation     Strength:  Not Performed    Nutrition Assessment:    inadequate oral intakes r/t altered GI aeb taste alterations since having covid. She states she can taste salty/strong flavored foods. Makes herself eat, knows she has to. Magnesium is low at 1.2, GFR 57, K+ 3.4. Dx UTI, had fall due to fatigue and weakness since COVID Dx 6/6. Recommend addition of probiotic due to ATB therapy. Hx CKD abd GERD. d/c to [facility] planned.  Pt encouraged to consume yogurt due to ATB therapy.    Nutrition Related Findings:    no edema Wound Type: None       Current Nutrition Intake & Therapies:    Average Meal Intake: %  Average Supplements Intake: None Ordered  ADULT DIET; Regular    Anthropometric Measures:  Height: 165.1 cm (5' 5\")  Ideal Body Weight (IBW): 125 lbs (57 kg)    Admission Body Weight: 100.5 kg (221 lb 9 oz)  Current Body Weight: 100.5 kg (221 lb 9 oz), 177.2 % IBW. Weight Source: Bed Scale  Current BMI (kg/m2): 36.9  Usual Body Weight: 95.3 kg (210 lb)  % Weight Change (Calculated): 5.5  Weight Adjustment For: No Adjustment                 BMI Categories: Obese Class 2 (BMI 35.0 -39.9)    Estimated Daily Nutrient Needs:  Energy Requirements Based On: Kcal/kg  Weight Used for Energy Requirements: Current  Energy (kcal/day): 3042-8467 (15-18/kg)  Weight Used for Protein Requirements: Ideal  Protein (g/day): 74-86g

## 2024-06-28 NOTE — PROGRESS NOTES
plan: Continue current treatment  Imaging: no further imaging studies ordered today  Medications:   Continue Eliquis     Nutrition status:   at risk for malnutrition  Dietician consult initiated     Hospital Prophylaxis:   DVT: Eliquis   Stress Ulcer: PPI      Disposition:  Shared decision making: All test results, treatment options and disposition options were discussed with the patient today  Social determinants of health that may impact management: Pt lives alone and is unable to care for self  Code status: Full Code   Disposition: Discharge plan is Skilled Nursing Facility    [facility] Advanced Care Planning documentation:  [x] I have confirmed that the patient's Advance Care Plan is present, Code Status is documented, or surrogate decision maker is listed in the patient's medical record  [If \"yes\", STOP HERE]     [] The patient's Advance Care Plan is NOT present because:    []  I confirmed today that the patient does not wish or was not able to name a   surrogate decision maker or provide and advance care plan.    [] Hospice care is currently being provided or has been provided within the   calendar year.    []  I did NOT confirm today the presence of an Advance Care Plan or surrogate   decision maker documented within the patient's medical record.   [DOES NOT SATISFY [facility] PERFORMANCE]    Nicki Batista, GRIS - CNP , APRN, NP-C  Hospitalist Medicine        6/28/2024, 7:23 AM

## 2024-06-28 NOTE — PROGRESS NOTES
Physical Therapy  Facility/Department: [facility] MED SURG  Physical Therapy Initial Assessment    Name: Kaye Joyce  : 1945  MRN: [id number]  Date of Service: 2024    Discharge Recommendations:  Continue to assess pending progress, Subacute/Skilled Nursing Facility     Patient Diagnosis(es): The primary encounter diagnosis was Urinary tract infection without hematuria, site unspecified. Diagnoses of Hypomagnesemia and Fall, initial encounter were also pertinent to this visit.  Past Medical History:  has a past medical history of Arthritis, Blood clot in vein, Brain bleed ([facility]), Cataract, Closed nondisplaced fracture of surgical neck of right humerus with routine healing, DVT (deep venous thrombosis) ([facility]), History of hip replacement, History of total bilateral knee replacement, Hypertension, S/P cardiac cath, and Sleep apnea.  Past Surgical History:  has a past surgical history that includes joint replacement (Bilateral); Colonoscopy (2019); Colonoscopy (N/A, 2023); Upper gastrointestinal endoscopy (N/A, 2023); Esophagogastroduodenoscopy (2023); Colonoscopy (2023); Upper gastrointestinal endoscopy (N/A, 2023); and Esophagogastroduodenoscopy (2023).    Assessment   Body Structures, Functions, Activity Limitations Requiring Skilled Therapeutic Intervention: Decreased functional mobility ;Decreased ADL status;Decreased ROM;Decreased strength;Decreased endurance;Decreased balance;Decreased coordination;Increased pain;Decreased posture  Assessment: Per lizbeth (Nicki) patient ok to get up to chair. Patient with right upper extremity in sling throughout evaluation. Patient performed supine to sit bed mobility rolling towards the left to sit up with minAx1 and use of 1 handrail and 1 bedrail. Patient was able to perform sit to stand with minAx1 and verbal cues for correct hand placement of transfers. Patient ambulated with quad cane 5 feet to chair with minAx1 and cues

## 2024-06-28 NOTE — H&P
eval  Imaging: no further imaging studies ordered today  Medications:   Levaquin  Received one-time dose of Rocephin in ED  Medication Monitoring / High Risk Medications: none      Right shoulder pain  Condition is stable  Treatment plan:   Orthopedic surgery consult  PT eval  Shoulder immobilizer  Imaging: CT right shoulder ordered  Medications:   Norco as needed for pain  Tizanidine as needed for muscle spasm      Secondary hypercoagulable state/history DVT  Condition is a chronic stable condition  Treatment plan: Continue current treatment  Imaging: no further imaging studies ordered today  Medications: Continue Eliquis    Nutrition status:   at risk for malnutrition  Dietician consult initiated    Hospital Prophylaxis:   DVT: Eliquis   Stress Ulcer: PPI     MDM Data:   Test interpretation:  My independent EKG interpretation: sinus bradycardia, 1st degree AV block  My independent X-ray interpretation: CT head and C-spine negative  Management and/or test interpretation discussed with ER MD at time of admission  Consults and Nursing notes were personally reviewed, all current labs and imaging were personally reviewed, tests ordered: CBC, BMP, and history obtained by independent historian       Disposition:  Shared decision making: All test results, treatment options and disposition options were discussed with the patient today  Social determinants of health that may impact management: none  Code status: Full Code   Disposition: Discharge plan is pending        Critical Care Time:  Total critical care time caring for this patient with life threatening, unstable organ failure, including direct patient contact, management of life support systems, review of data including imaging and labs, discussions with other team members and physicians at least 0 minutes so far today, excluding separately billable procedures.      MIPS Medication Reconciliation documentation:    [x] I have utilized all available immediate resources  to obtain, update, or review the patient's current medications (including all prescriptions, over-the-counter products, herbals, cannabinoid products and bitamin/mineral/dietary/nutritional supplements.  [If 'yes\", STOP HERE]     []  The patient is not eligible for medication reconciliation; the patient is in an emergent medical situation where delaying treatment would jeopardize the patient's health    []  I did NOT confirm, update or review the patient's current list of medications today.  [DOES NOT SATISFY [facility] PERFORMANCE]        [facility] Advanced Care Planning documentation:  [x] I have confirmed that the patient's Advance Care Plan is present, Code Status is documented, or surrogate decision maker is listed in the patient's medical record  [If \"yes\", STOP HERE]     [] The patient's Advance Care Plan is NOT present because:    []  I confirmed today that the patient does not wish or was not able to name a   surrogate decision maker or provide and advance care plan.    [] Hospice care is currently being provided or has been provided within the   calendar year.    []  I did NOT confirm today the presence of an Advance Care Plan or surrogate   decision maker documented within the patient's medical record.   [DOES NOT SATISFY MIPS PERFORMANCE]    CORE MEASURES  DVT prophylaxis: already on chronic anticoagulation  Decubitus ulcer present on admission: No  CODE STATUS: FULL CODE  Nutrition Status: good   Physical therapy: Yes   Old Charts reviewed: Yes  EKG Reviewed: Yes  Advance Directive Addressed: Yes    GRIS Marcos - CNP, GRIS, NP-C  6/28/2024, 1:17 AM

## 2024-06-28 NOTE — PLAN OF CARE
Problem: Discharge Planning  Goal: Discharge to home or other facility with appropriate resources  Outcome: Progressing  Flowsheets (Taken 6/27/2024 1946)  Discharge to home or other facility with appropriate resources:   Identify barriers to discharge with patient and caregiver   Identify discharge learning needs (meds, wound care, etc)   Refer to discharge planning if patient needs post-hospital services based on physician order or complex needs related to functional status, cognitive ability or social support system   Arrange for needed discharge resources and transportation as appropriate   Arrange for interpreters to assist at discharge as needed     Problem: Pain  Goal: Verbalizes/displays adequate comfort level or baseline comfort level  Outcome: Progressing  Flowsheets (Taken 6/28/2024 0128)  Verbalizes/displays adequate comfort level or baseline comfort level:   Administer analgesics based on type and severity of pain and evaluate response   Encourage patient to monitor pain and request assistance   Assess pain using appropriate pain scale   Implement non-pharmacological measures as appropriate and evaluate response     Problem: Safety - Adult  Goal: Free from fall injury  Outcome: Progressing  Flowsheets (Taken 6/28/2024 0128)  Free From Fall Injury: Instruct family/caregiver on patient safety     Problem: Chronic Conditions and Co-morbidities  Goal: Patient's chronic conditions and co-morbidity symptoms are monitored and maintained or improved  Outcome: Progressing  Flowsheets (Taken 6/28/2024 0128)  Care Plan - Patient's Chronic Conditions and Co-Morbidity Symptoms are Monitored and Maintained or Improved:   Monitor and assess patient's chronic conditions and comorbid symptoms for stability, deterioration, or improvement   Collaborate with multidisciplinary team to address chronic and comorbid conditions and prevent exacerbation or deterioration     Problem: Skin/Tissue Integrity  Goal: Absence of new

## 2024-06-28 NOTE — PROGRESS NOTES
Spiritual Services Interventions  0312/0312-01   6/28/2024  Julian Ash    Kaye Joyce  78 y.o. year old female    Encounter Summary  Encounter Overview/Reason: (P) Initial Encounter  Service Provided For: (P) Patient and family together  Referral/Consult From: (P) Rounding  Last Encounter : (P) 06/28/24  Complexity of Encounter: (P) Moderate  Begin Time: (P) 0900  End Time : (P) 0915  Total Time Calculated: (P) 15 min     Spiritual/Emotional needs  Type: (P) Spiritual Support                    Assessment/Intervention/Outcome  Assessment: (P) Calm  Intervention: (P) Discussed belief system/Latter day practices/david, Discussed illness injury and it’s impact  Outcome: (P) Encouraged, Engaged in conversation

## 2024-06-28 NOTE — PROGRESS NOTES
Patient has decided to go to the [facility] for short skilled care.  Referral made and paper work fax.  Will need insurance approval before she can go to SNF.    MEJIA Snyder

## 2024-06-28 NOTE — CONSULTS
Department of Orthopedic Surgery  Attending Consult Note        Reason for Consult:  Right shoulder pain, Left leg pain    CHIEF COMPLAINT:  fall, right shoulder pain, weakness    History Obtained From:  patient    HISTORY OF PRESENT ILLNESS:                The patient is a 78 y.o. female who presented to the emergency department yesterday after she sustained a fall at her home.  Patient states that she was trying to sit down and became weak, falling down.  Patient noted right shoulder pain.  Patient brought to the emergency department and subsequently admitted.    Patient had recent history of COVID, which is why she feels she has been weak.  She does have previous history of right shoulder pain secondary to adhesive capsulitis and severe arthritis.    Of note, patient also complains of a left lower extremity pain that has been ongoing for the past week or so.  She denies any known cause.  She denies numbness and tingling.  She reports pain over the lateral lower leg.    Past Medical History:        Diagnosis Date    Arthritis     Blood clot in vein 04/2022    on Xarelto for two months per patient    Brain bleed ([facility]) 1983    Cataract     Closed nondisplaced fracture of surgical neck of right humerus with routine healing 01/11/2022    DVT (deep venous thrombosis) ([facility])     History of hip replacement     History of total bilateral knee replacement     Hypertension     S/P cardiac cath 02/17/2023    [facility]/Dr. Montiel/Right Radial    Sleep apnea      Past Surgical History:        Procedure Laterality Date    COLONOSCOPY  08/13/2019    COLONOSCOPY N/A 03/28/2023    COLORECTAL CANCER SCREENING, NOT HIGH RISK performed by Chasity Perez MD at [facility] OR    COLONOSCOPY  03/28/2023    -diverticulosis,hemorrhoids    ESOPHAGOGASTRODUODENOSCOPY  03/28/2023    -gastric antral vascular ectasia-clip placed    ESOPHAGOGASTRODUODENOSCOPY  08/23/2023    [id number]    JOINT REPLACEMENT  shoulder.  There is no obvious soft tissue trauma.  She reports generalized pain to palpation about the shoulder.  She denies any pain at the elbow, wrist, and hand.  Sensation and motor function intact distally.  She is currently wearing a bucket sling.    Left lower extremity: Patient reports generalized pain along the lateral aspect of her lower leg.  There is no pain along the IT band proximally.  No specific pain within the calf region to indicate DVT.  She has intact dorsiflexion/plantarflexion at the ankle with no significant pain.  Sensation and motor function intact distally.  Compartments of the lower leg are soft and compressible.  She denies any significant pain at the knee.      DATA:    CT scan right shoulder  IMPRESSION:  1. Acute nondisplaced nonangulated right proximal humeral metadiaphyseal  fracture with involvement of the surgical neck.  2. Normal glenohumeral alignment with severe glenohumeral arthropathy with  effusion and axillary pouch ossified intra-articular bodies.  3. Chronic rotator cuff tendinopathy with moderate fat atrophy of the  supraspinatus, infraspinatus, and subscapularis muscle bellies.  4. Moderate subacromial bursal fluid which may relate to bursitis versus  full-thickness rotator cuff tear.        IMPRESSION/RECOMMENDATIONS:    Right nondisplaced proximal humerus fracture  Severe right glenohumeral joint osteoarthritis  Nonspecific left lower leg pain    In regards to the right shoulder, recommendation is sling immobilization.  Recommend nonsurgical treatment.  Avoid active motion of the shoulder at this time.  Continue with bucket sling when mobilizing.    In regards to the left lower leg, pain is nonspecific.  Recommend physical therapy.  Pain could be radicular in nature.  No further testing at this time.      Recommend patient follow-up on an outpatient basis for the right shoulder.    Please call with any questions or concerns

## 2024-06-28 NOTE — DISCHARGE INSTR - COC
Continuity of Care Form    Patient Name: Kaye Joyce   :  1945  MRN:  [id number]    Admit date:  2024  Discharge date:  2024    Code Status Order: Full Code   Advance Directives:     Admitting Physician:  Javon Hughes MD  PCP: Robert Gonsales MD    Discharging Nurse: Ofe Noe RN  Discharging Hospital Unit/Room#: 0312/0312-01  Discharging Unit Phone Number: [phone]    Emergency Contact:   Extended Emergency Contact Information  Primary Emergency Contact: Bryanna Aly   [facility]  Home Phone: [phone]  Relation: Brother/Sister  Hearing or visual needs: None  Other needs: None  Preferred language: English   needed? No  Secondary Emergency Contact: Joe Joyce  Home Phone: [phone]  Relation: Child   needed? No    Past Surgical History:  Past Surgical History:   Procedure Laterality Date    COLONOSCOPY  2019    COLONOSCOPY N/A 2023    COLORECTAL CANCER SCREENING, NOT HIGH RISK performed by Chasity Perez MD at [facility] OR    COLONOSCOPY  2023    -diverticulosis,hemorrhoids    ESOPHAGOGASTRODUODENOSCOPY  2023    -gastric antral vascular ectasia-clip placed    ESOPHAGOGASTRODUODENOSCOPY  2023    [id number]    JOINT REPLACEMENT Bilateral     knees and hips    UPPER GASTROINTESTINAL ENDOSCOPY N/A 2023    EGD ESOPHAGOGASTRODUODENOSCOPY performed by Chasity Perez MD at [facility] OR    UPPER GASTROINTESTINAL ENDOSCOPY N/A 2023    EGD ESOPHAGOGASTRODUODENOSCOPY performed by Chasity Perez MD at [facility] OR       Immunization History:   Immunization History   Administered Date(s) Administered    COVID-19, MODERNA BLUE border, Primary or Immunocompromised, (age 12y+), IM, 100 mcg/0.5mL 2021, 2021    COVID-19, MODERNA Bivalent, (age 12y+), IM, 50 mcg/0.5 mL 2022    COVID-19, MODERNA Booster BLUE border, (age 18y+), IM, 50mcg/0.25mL 2021    Influenza, FLUAD, (age  None    Nursing Mobility/ADLs:  Walking   Assisted  Transfer  Assisted  Bathing  Assisted  Dressing  Assisted  Toileting  Assisted  Feeding  Independent  Med Admin  Assisted  Med Delivery   whole    Wound Care Documentation and Therapy:        Elimination:  Continence:   Bowel: Yes  Bladder: No, uses pad for incontinence  Urinary Catheter: None   Colostomy/Ileostomy/Ileal Conduit: No       Date of Last BM: 07/08/2024    Intake/Output Summary (Last 24 hours) at 7/8/2024 1046  Last data filed at 7/8/2024 0523  Gross per 24 hour   Intake 500 ml   Output --   Net 500 ml     I/O last 3 completed shifts:  In: 1300 [P.O.:1300]  Out: -     Safety Concerns:     History of Falls (last 30 days) and At Risk for Falls    Impairments/Disabilities:      Vision    Nutrition Therapy:  Current Nutrition Therapy:   - Oral Diet:  General    Routes of Feeding: Oral  Liquids: Thin Liquids  Daily Fluid Restriction: no  Last Modified Barium Swallow with Video (Video Swallowing Test): not done    Treatments at the Time of Hospital Discharge:   Respiratory Treatments: n/a  Oxygen Therapy:  is not on home oxygen therapy.  Ventilator:    - CPAP    ,   only when sleeping and device from home    Rehab Therapies: Physical Therapy and Occupational Therapy  Weight Bearing Status/Restrictions: No weight bearing restrictions  Other Medical Equipment (for information only, NOT a DME order):  cane  Other Treatments: Sling and swathe    Patient's personal belongings (please select all that are sent with patient):  Glasses    RN SIGNATURE:  Electronically signed by MARCELINO GONZALES RN on 7/8/24 at 3:11 PM EDT    CASE MANAGEMENT/SOCIAL WORK SECTION    Inpatient Status Date: 6/28/24    Readmission Risk Assessment Score:  Readmission Risk              Risk of Unplanned Readmission:  15           Discharging to Facility/ Agency   Name: [facility]  Address:[address] [place] [address]  Phone:[phone]  Fax:[phone]    Dialysis Facility (if applicable)

## 2024-06-28 NOTE — PROGRESS NOTES
Physical Therapy  Facility/Department: [facility] MED SURG  Daily Treatment Note  NAME: Kaye Joyce  : 1945  MRN: [id number]    Date of Service: 2024    Discharge Recommendations:  Continue to assess pending progress, Subacute/Skilled Nursing Facility     Patient Diagnosis(es): The primary encounter diagnosis was Urinary tract infection without hematuria, site unspecified. Diagnoses of Hypomagnesemia and Fall, initial encounter were also pertinent to this visit.    Assessment   Assessment: Transfers: MinAx1. Pt. ambulated 10ftx1 with SC and CGA/MinAx1 for safety. Pt. demoed slow cadance and decreased L step length. No LOB noted. BSC use and transfer. Completed reclined seated BLE therex x15 in all available planes of motion. RB needed throughout d/t fatigue.  Activity Tolerance: Patient tolerated evaluation without incident     Plan    Physical Therapy Plan  General Plan: 2 times a day 7 days a week  Specific Instructions for Next Treatment: 1x/ daily on weekends  Current Treatment Recommendations: Strengthening;ROM;Balance training;Functional mobility training;Transfer training;Gait training;Stair training;Manual;Home exercise program;Pain management;Safety education & training;Patient/Caregiver education & training;Equipment evaluation, education, & procurement;Positioning;Therapeutic activities     Restrictions  Restrictions/Precautions  Restrictions/Precautions: Fall Risk, Weight Bearing, General Precautions, Contact Precautions  Required Braces or Orthoses?: Yes  Upper Extremity Weight Bearing Restrictions  Right Upper Extremity Weight Bearing: Non Weight Bearing  Required Braces or Orthoses  Right Upper Extremity Brace/Splint: Right upper extremity in sling- nursing asked about more of an immobilizer brace, therapist informed nursing to check with central supply     Subjective    Subjective  Subjective: Pt. in chair upon arrival, agreeable to therapy at this time.  Pain: Denies pain without  movement, 8/10 L LE pain when ambulating  Orientation  Overall Orientation Status: Within Functional Limits     Objective   Bed Mobility Training  Bed Mobility Training: No  Overall Level of Assistance: Minimum assistance  Rolling: Contact-guard assistance  Supine to Sit: Minimum assistance  Balance  Sitting: Intact  Standing: Impaired  Standing - Static: Fair  Standing - Dynamic: Poor;Constant support  Transfer Training  Transfer Training: Yes  Overall Level of Assistance: Minimum assistance;Assist X1  Interventions: Verbal cues  Sit to Stand: Minimum assistance;Assist X1  Stand to Sit: Minimum assistance;Assist X1  Toilet Transfer: Minimum assistance;Assist X1 (BSC)  Gait Training  Right Side Weight Bearing: Full  Left Side Weight Bearing: Full  Gait  Gait Training: Yes  Left Side Weight Bearing: Full  Right Side Weight Bearing: Full  Overall Level of Assistance: Minimum assistance;Assist X1  Distance (ft): 10 Feet  Assistive Device: Cane, straight  Interventions: Safety awareness training  Speed/Tatiana: Slow  Step Length: Left shortened  Gait Abnormalities: Antalgic  PT Exercises  Exercise Treatment: Reclined seated BLE therex x15 in all available planes of motion  Safety Devices  Type of Devices: Left in chair;Call light within reach;Nurse notified       Goals  Short Term Goals  Time Frame for Short Term Goals: 10 visits  Short Term Goal 1: Patient will complete bed mobility and transfers with SBAx1 in order to ease functional mobility  Short Term Goal 2: Patient will ambulate 50 feetx1 with SC and CGAx1 in order to ease functional mobility  Short Term Goal 3: Patient will ascend/descend three steps with handrail and CGAX1 in order to safely enter/exit the home  Short Term Goal 4: Patient will tolerate 20-30' ther-ex in order to increase enduarance and ease ADLs    Education  Patient Education  Education Given To: Patient  Education Provided: Role of Therapy;Plan of Care;Equipment  Education Method:

## 2024-06-28 NOTE — PROGRESS NOTES
LLE wrapped with ace. Pt tolerated well. Provided with pillows and blanket per request. Bed alarm on and call light in reach.

## 2024-06-28 NOTE — RT PROTOCOL NOTE
RESPIRATORY ASSESSMENT PROTOCOL                                                                                              Patient Name: Kaye Joyce Room#: 0312/0312-01 : 1945     Admitting diagnosis: Hypomagnesemia [E83.42]  UTI (urinary tract infection) [N39.0]  Fall, initial encounter [W19.XXXA]  Urinary tract infection without hematuria, site unspecified [N39.0]       Medical History:   Past Medical History:   Diagnosis Date    Arthritis     Blood clot in vein 2022    on Xarelto for two months per patient    Brain bleed (HCC)     Cataract     DVT (deep venous thrombosis) (HCC)     History of hip replacement     History of total bilateral knee replacement     Hypertension     S/P cardiac cath 2023    [facility]/Dr. Montiel/Right Radial    Sleep apnea        PATIENT ASSESSMENT    LABORATORY DATA  Hematology:   Lab Results   Component Value Date/Time    WBC 13.5 2024 04:10 PM    RBC 4.73 2024 04:10 PM    RBC 4.67 2024 10:30 AM    HGB 14.7 2024 04:10 PM    HCT 42.9 2024 04:10 PM     2024 04:10 PM     Chemistry:  No results found for: \"PHART\", \"SYS4NJK\", \"PO2ART\", \"L2PEXHUC\", \"EJJ0USB\", \"PBEA\", \"NBEA\"    VITALS  Pulse: 53   Respirations: 16  BP: (!) 143/63  SpO2: 93 % O2 Device: None (Room air)  Temp: 97.8 °F (36.6 °C)    SKIN COLOR  [x] Normal  [] Pale  [] Dusky  [] Cyanotic    RESPIRATORY PATTERN  [x] Normal  [] Dyspnea  [] Cheyne-Melendez  [] Kussmaul  [] Biots    AMBULATORY  [x] Yes  [] No  [] With Assistance    PEAK FLOW  Predicted:     Personal Best:            Patient Acuity 0 1 2 3 4 Score   Level of Consciousness (LOC) [x]  Alert & Oriented or Pt normal LOC []  Confused;follows directions []  Confused & uncooper-ative []  Obtunded []  Comatose 0   Respiratory Rate  (RR) [x]  Reg. rate & pattern. 12 - 20 bpm  []  Increased RR. Greater than 20 bpm   []  SOB w/ exertion or RR greater than 24 bpm []  Access- ory muscle use at rest. Abn.   No     __x__ NA    Smoking Cessation Booklet given:  ____Yes  ____No ____Patient Refused

## 2024-06-28 NOTE — CARE COORDINATION
Case Management Assessment  Initial Evaluation    Date/Time of Evaluation: 6/28/2024 9:08 AM  Assessment Completed by: MEJIA Snyder    If patient is discharged prior to next notation, then this note serves as note for discharge by case management.    Patient Name: Kaye Joyce                   YOB: 1945  Diagnosis: Hypomagnesemia [E83.42]  UTI (urinary tract infection) [N39.0]  Fall, initial encounter [W19.XXXA]  Urinary tract infection without hematuria, site unspecified [N39.0]                   Date / Time: 6/27/2024  3:17 PM    Patient Admission Status: Inpatient   Readmission Risk (Low < 19, Mod (19-27), High > 27): Readmission Risk Score: 11.1    Current PCP: Robert Gonsales MD  PCP verified by CM? Yes    Chart Reviewed: Yes      History Provided by: Patient  Patient Orientation: Alert and Oriented, Person, Place, Situation, Self    Patient Cognition: Alert    Hospitalization in the last 30 days (Readmission):  No    If yes, Readmission Assessment in  Navigator will be completed.    Advance Directives:      Code Status: Full Code   Patient's Primary Decision Maker is: Legal Next of Kin    Primary Decision Maker: Joe Joyce - Child - [phone]    Discharge Planning:    Patient lives with: Alone Type of Home: House  Primary Care Giver: Self  Patient Support Systems include: Children, Family Members   Current Financial resources: Medicare  Current community resources: None  Current services prior to admission: C-pap, Durable Medical Equipment            Current DME: Cpap, Cane            Type of Home Care services:  OT, PT    ADLS  Prior functional level: Independent in ADLs/IADLs  Current functional level: Assistance with the following:    PT AM-PAC:   /24  OT AM-PAC:   /24    Family can provide assistance at DC: Yes  Would you like Case Management to discuss the discharge plan with any other family members/significant others, and if so, who? Yes  Plans to Return to Present

## 2024-06-28 NOTE — PROGRESS NOTES
Vitals and assessment complete at this time. See flowsheets for details. Patient denies pain. Patient is sitting up in the chair. Patient is A&O x4. Breathing is regular and unlabored. Lung sounds are clear throughout. Hematoma noted to right side of head. RUE is in sling, skin is warm, pink, cap refill is WNL, and right radial pulse is +2. Patient denies numbness or tingling. Patient denies further needs at this time. Call light is within reach. Care ongoing.

## 2024-06-29 ENCOUNTER — APPOINTMENT (OUTPATIENT)
Dept: CT IMAGING | Age: 79
DRG: 563 | End: 2024-06-29
Attending: STUDENT IN AN ORGANIZED HEALTH CARE EDUCATION/TRAINING PROGRAM
Payer: MEDICARE

## 2024-06-29 ENCOUNTER — APPOINTMENT (OUTPATIENT)
Dept: CT IMAGING | Age: 79
DRG: 563 | End: 2024-06-29
Payer: MEDICARE

## 2024-06-29 ENCOUNTER — APPOINTMENT (OUTPATIENT)
Dept: GENERAL RADIOLOGY | Age: 79
DRG: 563 | End: 2024-06-29
Payer: MEDICARE

## 2024-06-29 LAB
25(OH)D3 SERPL-MCNC: 44.3 NG/ML (ref 30–100)
ALBUMIN SERPL-MCNC: 3.5 G/DL (ref 3.5–5.2)
ALBUMIN/GLOB SERPL: 1.3 {RATIO} (ref 1–2.5)
ALP SERPL-CCNC: 52 U/L (ref 35–104)
ALT SERPL-CCNC: 20 U/L (ref 5–33)
ANION GAP SERPL CALCULATED.3IONS-SCNC: 7 MMOL/L (ref 9–17)
AST SERPL-CCNC: 22 U/L
BASOPHILS # BLD: <0.03 K/UL (ref 0–0.2)
BASOPHILS NFR BLD: 0 % (ref 0–2)
BILIRUB SERPL-MCNC: 0.4 MG/DL (ref 0.3–1.2)
BUN SERPL-MCNC: 20 MG/DL (ref 8–23)
BUN/CREAT SERPL: 17 (ref 9–20)
CALCIUM SERPL-MCNC: 10 MG/DL (ref 8.6–10.4)
CHLORIDE SERPL-SCNC: 101 MMOL/L (ref 98–107)
CO2 SERPL-SCNC: 31 MMOL/L (ref 20–31)
CREAT SERPL-MCNC: 1.2 MG/DL (ref 0.5–0.9)
EOSINOPHIL # BLD: 0.12 K/UL (ref 0–0.44)
EOSINOPHILS RELATIVE PERCENT: 1 % (ref 1–4)
ERYTHROCYTE [DISTWIDTH] IN BLOOD BY AUTOMATED COUNT: 13.9 % (ref 11.8–14.4)
GFR, ESTIMATED: 46 ML/MIN/1.73M2
GLUCOSE SERPL-MCNC: 122 MG/DL (ref 70–99)
HCT VFR BLD AUTO: 41.2 % (ref 36.3–47.1)
HGB BLD-MCNC: 13.9 G/DL (ref 11.9–15.1)
IMM GRANULOCYTES # BLD AUTO: 0.05 K/UL (ref 0–0.3)
IMM GRANULOCYTES NFR BLD: 1 %
LYMPHOCYTES NFR BLD: 0.99 K/UL (ref 1.1–3.7)
LYMPHOCYTES RELATIVE PERCENT: 11 % (ref 24–43)
MCH RBC QN AUTO: 31 PG (ref 25.2–33.5)
MCHC RBC AUTO-ENTMCNC: 33.7 G/DL (ref 28.4–34.8)
MCV RBC AUTO: 91.8 FL (ref 82.6–102.9)
MONOCYTES NFR BLD: 0.76 K/UL (ref 0.1–1.2)
MONOCYTES NFR BLD: 8 % (ref 3–12)
NEUTROPHILS NFR BLD: 79 % (ref 36–65)
NEUTS SEG NFR BLD: 7.17 K/UL (ref 1.5–8.1)
NRBC BLD-RTO: 0 PER 100 WBC
PLATELET # BLD AUTO: 175 K/UL (ref 138–453)
PMV BLD AUTO: 9.7 FL (ref 8.1–13.5)
POTASSIUM SERPL-SCNC: 4 MMOL/L (ref 3.7–5.3)
PROT SERPL-MCNC: 6.2 G/DL (ref 6.4–8.3)
RBC # BLD AUTO: 4.49 M/UL (ref 3.95–5.11)
SODIUM SERPL-SCNC: 139 MMOL/L (ref 135–144)
WBC OTHER # BLD: 9.1 K/UL (ref 3.5–11.3)

## 2024-06-29 PROCEDURE — 94761 N-INVAS EAR/PLS OXIMETRY MLT: CPT

## 2024-06-29 PROCEDURE — 97116 GAIT TRAINING THERAPY: CPT

## 2024-06-29 PROCEDURE — 94664 DEMO&/EVAL PT USE INHALER: CPT

## 2024-06-29 PROCEDURE — 80053 COMPREHEN METABOLIC PANEL: CPT

## 2024-06-29 PROCEDURE — 2580000003 HC RX 258: Performed by: INTERNAL MEDICINE

## 2024-06-29 PROCEDURE — 82306 VITAMIN D 25 HYDROXY: CPT

## 2024-06-29 PROCEDURE — 73700 CT LOWER EXTREMITY W/O DYE: CPT

## 2024-06-29 PROCEDURE — 1200000000 HC SEMI PRIVATE

## 2024-06-29 PROCEDURE — 6370000000 HC RX 637 (ALT 250 FOR IP)

## 2024-06-29 PROCEDURE — 85025 COMPLETE CBC W/AUTO DIFF WBC: CPT

## 2024-06-29 PROCEDURE — 36415 COLL VENOUS BLD VENIPUNCTURE: CPT

## 2024-06-29 PROCEDURE — 6370000000 HC RX 637 (ALT 250 FOR IP): Performed by: INTERNAL MEDICINE

## 2024-06-29 PROCEDURE — 6370000000 HC RX 637 (ALT 250 FOR IP): Performed by: STUDENT IN AN ORGANIZED HEALTH CARE EDUCATION/TRAINING PROGRAM

## 2024-06-29 PROCEDURE — 97110 THERAPEUTIC EXERCISES: CPT

## 2024-06-29 PROCEDURE — 73590 X-RAY EXAM OF LOWER LEG: CPT

## 2024-06-29 RX ORDER — BISACODYL 5 MG/1
5 TABLET, DELAYED RELEASE ORAL ONCE
Status: COMPLETED | OUTPATIENT
Start: 2024-06-29 | End: 2024-06-29

## 2024-06-29 RX ORDER — DOCUSATE SODIUM 100 MG/1
100 CAPSULE, LIQUID FILLED ORAL 2 TIMES DAILY
Status: DISCONTINUED | OUTPATIENT
Start: 2024-06-29 | End: 2024-07-08 | Stop reason: HOSPADM

## 2024-06-29 RX ADMIN — CHLORTHALIDONE 25 MG: 25 TABLET ORAL at 09:07

## 2024-06-29 RX ADMIN — HYDROCODONE BITARTRATE AND ACETAMINOPHEN 2 TABLET: 5; 325 TABLET ORAL at 20:48

## 2024-06-29 RX ADMIN — ANTACID TABLETS 500 MG: 500 TABLET, CHEWABLE ORAL at 17:30

## 2024-06-29 RX ADMIN — SODIUM CHLORIDE, PRESERVATIVE FREE 10 ML: 5 INJECTION INTRAVENOUS at 20:33

## 2024-06-29 RX ADMIN — DOCUSATE SODIUM 100 MG: 100 CAPSULE, LIQUID FILLED ORAL at 20:34

## 2024-06-29 RX ADMIN — DOCUSATE SODIUM 100 MG: 100 CAPSULE, LIQUID FILLED ORAL at 09:08

## 2024-06-29 RX ADMIN — ATENOLOL 100 MG: 50 TABLET ORAL at 09:07

## 2024-06-29 RX ADMIN — HYDROCODONE BITARTRATE AND ACETAMINOPHEN 2 TABLET: 5; 325 TABLET ORAL at 09:17

## 2024-06-29 RX ADMIN — PREDNISONE 5 MG: 5 TABLET ORAL at 20:33

## 2024-06-29 RX ADMIN — HYDROCODONE BITARTRATE AND ACETAMINOPHEN 2 TABLET: 5; 325 TABLET ORAL at 15:56

## 2024-06-29 RX ADMIN — BISACODYL 5 MG: 5 TABLET, COATED ORAL at 09:08

## 2024-06-29 RX ADMIN — PREDNISONE 5 MG: 5 TABLET ORAL at 09:08

## 2024-06-29 RX ADMIN — SODIUM CHLORIDE, PRESERVATIVE FREE 10 ML: 5 INJECTION INTRAVENOUS at 09:08

## 2024-06-29 RX ADMIN — ANTACID TABLETS 500 MG: 500 TABLET, CHEWABLE ORAL at 09:08

## 2024-06-29 RX ADMIN — APIXABAN 2.5 MG: 2.5 TABLET, FILM COATED ORAL at 20:33

## 2024-06-29 RX ADMIN — OXYCODONE HYDROCHLORIDE AND ACETAMINOPHEN 500 MG: 500 TABLET ORAL at 09:08

## 2024-06-29 RX ADMIN — APIXABAN 2.5 MG: 2.5 TABLET, FILM COATED ORAL at 09:08

## 2024-06-29 RX ADMIN — OXYBUTYNIN CHLORIDE 15 MG: 5 TABLET, EXTENDED RELEASE ORAL at 09:07

## 2024-06-29 RX ADMIN — HYDROCODONE BITARTRATE AND ACETAMINOPHEN 2 TABLET: 5; 325 TABLET ORAL at 04:52

## 2024-06-29 RX ADMIN — PANTOPRAZOLE SODIUM 40 MG: 40 TABLET, DELAYED RELEASE ORAL at 09:08

## 2024-06-29 RX ADMIN — SERTRALINE HYDROCHLORIDE 50 MG: 50 TABLET ORAL at 09:08

## 2024-06-29 ASSESSMENT — PAIN SCALES - GENERAL
PAINLEVEL_OUTOF10: 7
PAINLEVEL_OUTOF10: 8
PAINLEVEL_OUTOF10: 4
PAINLEVEL_OUTOF10: 0
PAINLEVEL_OUTOF10: 0
PAINLEVEL_OUTOF10: 9
PAINLEVEL_OUTOF10: 4
PAINLEVEL_OUTOF10: 4
PAINLEVEL_OUTOF10: 7

## 2024-06-29 ASSESSMENT — PAIN DESCRIPTION - ORIENTATION
ORIENTATION: LEFT
ORIENTATION: RIGHT;LEFT

## 2024-06-29 ASSESSMENT — PAIN DESCRIPTION - PAIN TYPE
TYPE: ACUTE PAIN

## 2024-06-29 ASSESSMENT — PAIN DESCRIPTION - DESCRIPTORS
DESCRIPTORS: ACHING
DESCRIPTORS: ACHING;SHARP
DESCRIPTORS: ACHING;SHARP

## 2024-06-29 ASSESSMENT — PAIN DESCRIPTION - LOCATION
LOCATION: LEG
LOCATION: SHOULDER;LEG
LOCATION: LEG

## 2024-06-29 ASSESSMENT — PAIN - FUNCTIONAL ASSESSMENT
PAIN_FUNCTIONAL_ASSESSMENT: ACTIVITIES ARE NOT PREVENTED
PAIN_FUNCTIONAL_ASSESSMENT: ACTIVITIES ARE NOT PREVENTED
PAIN_FUNCTIONAL_ASSESSMENT: PREVENTS OR INTERFERES SOME ACTIVE ACTIVITIES AND ADLS
PAIN_FUNCTIONAL_ASSESSMENT: ACTIVITIES ARE NOT PREVENTED
PAIN_FUNCTIONAL_ASSESSMENT: ACTIVITIES ARE NOT PREVENTED

## 2024-06-29 ASSESSMENT — PAIN DESCRIPTION - ONSET: ONSET: GRADUAL

## 2024-06-29 ASSESSMENT — PAIN DESCRIPTION - FREQUENCY: FREQUENCY: CONTINUOUS

## 2024-06-29 NOTE — PLAN OF CARE
Problem: Pain  Goal: Verbalizes/displays adequate comfort level or baseline comfort level  6/29/2024 1941 by Flakita Jordan, RN  Outcome: Progressing  Flowsheets  Taken 6/29/2024 1941  Verbalizes/displays adequate comfort level or baseline comfort level:   Encourage patient to monitor pain and request assistance   Assess pain using appropriate pain scale   Administer analgesics based on type and severity of pain and evaluate response   Implement non-pharmacological measures as appropriate and evaluate response  Taken 6/29/2024 1908  Verbalizes/displays adequate comfort level or baseline comfort level: Encourage patient to monitor pain and request assistance  Note: Patient able to communicate when in pain. Will continue to monitor this shift.     Problem: Safety - Adult  Goal: Free from fall injury  6/29/2024 1941 by Flakita Jordan RN  Outcome: Progressing  Note: Bed alarm on, bed locked, side rails up, gripper socks on, call light within reach and able to use appropriately. Will continue to monitor this shift.     Problem: Chronic Conditions and Co-morbidities  Goal: Patient's chronic conditions and co-morbidity symptoms are monitored and maintained or improved  Outcome: Progressing  Flowsheets  Taken 6/29/2024 1941  Care Plan - Patient's Chronic Conditions and Co-Morbidity Symptoms are Monitored and Maintained or Improved: Monitor and assess patient's chronic conditions and comorbid symptoms for stability, deterioration, or improvement  Taken 6/29/2024 1908  Care Plan - Patient's Chronic Conditions and Co-Morbidity Symptoms are Monitored and Maintained or Improved: Monitor and assess patient's chronic conditions and comorbid symptoms for stability, deterioration, or improvement     Problem: Skin/Tissue Integrity  Goal: Absence of new skin breakdown  Description: 1.  Monitor for areas of redness and/or skin breakdown  2.  Assess vascular access sites hourly  3.  Every 4-6 hours minimum:  Change oxygen saturation

## 2024-06-29 NOTE — PLAN OF CARE
Problem: Discharge Planning  Goal: Discharge to home or other facility with appropriate resources  Outcome: Progressing  Flowsheets (Taken 6/28/2024 1815 by Laura Bridges, RN)  Discharge to home or other facility with appropriate resources:   Identify barriers to discharge with patient and caregiver   Arrange for needed discharge resources and transportation as appropriate   Identify discharge learning needs (meds, wound care, etc)     Problem: Pain  Goal: Verbalizes/displays adequate comfort level or baseline comfort level  Outcome: Progressing     Problem: Safety - Adult  Goal: Free from fall injury  Outcome: Progressing  Flowsheets (Taken 6/29/2024 0009)  Free From Fall Injury: Instruct family/caregiver on patient safety     Problem: Chronic Conditions and Co-morbidities  Goal: Patient's chronic conditions and co-morbidity symptoms are monitored and maintained or improved  Outcome: Progressing  Flowsheets (Taken 6/28/2024 1815 by Laura Bridges, RN)  Care Plan - Patient's Chronic Conditions and Co-Morbidity Symptoms are Monitored and Maintained or Improved:   Monitor and assess patient's chronic conditions and comorbid symptoms for stability, deterioration, or improvement   Collaborate with multidisciplinary team to address chronic and comorbid conditions and prevent exacerbation or deterioration     Problem: Skin/Tissue Integrity  Goal: Absence of new skin breakdown  Description: 1.  Monitor for areas of redness and/or skin breakdown  2.  Assess vascular access sites hourly  3.  Every 4-6 hours minimum:  Change oxygen saturation probe site  4.  Every 4-6 hours:  If on nasal continuous positive airway pressure, respiratory therapy assess nares and determine need for appliance change or resting period.  Outcome: Progressing     Problem: Nutrition Deficit:  Goal: Optimize nutritional status  6/29/2024 0010 by Lisa Gurrola, RN  Outcome: Progressing  6/28/2024 1452 by Rukhsana Jackson, RD, LD  Note: Nutrition  Problem #1: Inadequate oral intake  Intervention: Food and/or Nutrient Delivery: Continue Current Diet    6/28/2024 1450 by Rukhsana Jackson, TAVARES, LD  Flowsheets (Taken 6/28/2024 1450)  Nutrient intake appropriate for improving, restoring, or maintaining nutritional needs:   Monitor oral intake, labs, and treatment plans   Recommend appropriate diets, oral nutritional supplements, and vitamin/mineral supplements  Note: Nutrition Problem #1: Overweight/Obese  Intervention: Food and/or Nutrient Delivery: Continue Current Diet

## 2024-06-29 NOTE — PROGRESS NOTES
Patient resting comfortably at this time. Patient denies any pain and denies any other needs at this time. Patient alert & oriented x4 and able to answer all questions appropriately and follow commands. Assessment and vitals as charted. Chair locked, gripper socks on, call light within reach and able to use appropriately. Will continue to monitor this shift.

## 2024-06-29 NOTE — PROGRESS NOTES
Report received from ADRIEN Burnett. Assessment completed at this time, see flowsheet for more details. Pt resting in bed at this time, pt denies any pain. Pt states she is comfortable. All needs met at this time, call light within reach. Care ongoing.

## 2024-06-29 NOTE — PLAN OF CARE
Problem: Discharge Planning  Goal: Discharge to home or other facility with appropriate resources  6/29/2024 1117 by Kacey Jiang  Outcome: Progressing     Problem: Pain  Goal: Verbalizes/displays adequate comfort level or baseline comfort level  6/29/2024 1117 by Kacey Jiang  Outcome: Progressing     Problem: Safety - Adult  Goal: Free from fall injury  6/29/2024 1117 by Kacey Jiang  Outcome: Progressing

## 2024-06-29 NOTE — RT PROTOCOL NOTE
RESPIRATORY ASSESSMENT PROTOCOL                                                                                              Patient Name: Kaye Joyce Room#: 0312/0312-01 : 1945     Admitting diagnosis: Hypomagnesemia [E83.42]  UTI (urinary tract infection) [N39.0]  Fall, initial encounter [W19.XXXA]  Urinary tract infection without hematuria, site unspecified [N39.0]       Medical History:   Past Medical History:   Diagnosis Date    Arthritis     Blood clot in vein 2022    on Xarelto for two months per patient    Brain bleed (HCC)     Cataract     Closed nondisplaced fracture of surgical neck of right humerus with routine healing 2022    DVT (deep venous thrombosis) ([facility])     History of hip replacement     History of total bilateral knee replacement     Hypertension     S/P cardiac cath 2023    [facility]/Dr. Montiel/Right Radial    Sleep apnea        PATIENT ASSESSMENT    LABORATORY DATA  Hematology:   Lab Results   Component Value Date/Time    WBC 9.1 2024 05:15 AM    RBC 4.49 2024 05:15 AM    RBC 4.67 2024 10:30 AM    HGB 13.9 2024 05:15 AM    HCT 41.2 2024 05:15 AM     2024 05:15 AM     Chemistry:  No results found for: \"PHART\", \"QBJ8BTQ\", \"PO2ART\", \"C1HBITWL\", \"AOK0QEF\", \"PBEA\", \"NBEA\"    VITALS  Pulse: 67   Respirations: 16  BP: 116/60  SpO2: 92 % O2 Device: None (Room air)  Temp: (!) 96.4 °F (35.8 °C)    SKIN COLOR  [x] Normal  [] Pale  [] Dusky  [] Cyanotic    RESPIRATORY PATTERN  [x] Normal  [] Dyspnea  [] Cheyne-Melendez  [] Kussmaul  [] Biots    AMBULATORY  [] Yes  [] No  [x] With Assistance    PEAK FLOW  Predicted:     Personal Best:            Patient Acuity 0 1 2 3 4 Score   Level of Consciousness (LOC) [x]  Alert & Oriented or Pt normal LOC []  Confused;follows directions []  Confused & uncooper-ative []  Obtunded []  Comatose 0   Respiratory Rate  (RR) [x]  Reg. rate & pattern. 12 - 20 bpm  []  Increased RR. Greater than  20 bpm   []  SOB w/ exertion or RR greater than 24 bpm []  Access- ory muscle use at rest. Abn.  resp. []  SOB at rest.   0   Bilateral Breath Sounds (BBS) [x]  Clear []  Diminish-ed bases  []  Diminish-ed t/o, or rales   []  Sporadic, scattered wheezes or rhonchi []  Persistentwheezes and, or absent BBS 0   Cough [x]  Strong, effective, & non-prod. []  Effective & prod. Less than 25 ml (2 TBSP) over past 24 hrs []  Ineffective & non-prod to less than 25 ML over past 24 hrs []  Ineffective and, or greater than 25 ml sputum prod. past 24 hrs. []  Nonspon- taneous; Requires suctioning 0   Pulmonary History  (PULM HX) [x]  No smoking and no chronic pulmonary history []  Former smoker. Quit over 12 mos. ago []  Current smoker or quit w/ in 12 mos []  Pulm. History and, or 20 pk/yr smoking hx []  Admitted w/ acute pulm. dx and, or has been admitted w/ pulm. dx 2 or more times over past 12 mos 0   Surgical History this Admit  (SURG HX) [x]  No surgery []  General surgery []  Lower abdominal []  Thoracic or upper abdominal   []  Thoracic w/ pulm. disease 0   Chest X-Ray (CXR)/CT Scan [x]  Clear or not applicable []  Not available []  Atelectasis or pleural effusions []  Localized infiltrate or pulm. edema []  Con-solidated Infiltrates, bilateral, or in more than 1 lobe 0   TOTAL ACUITY: 0       CARE PLAN    If Acuity Level is 2, 3, or 4 in any of the following:    [] BILATERAL BREATH SOUNDS (BBS)     [] PULMONARY HISTORY (PULM HX)  [] Respiratory Rate  (RR)    Goal: Improve respiratory functions in patients with airway disease and decrease WOB    [x] AEROSOL PROTOCOL    Total Acuity:   14-28  []  Secondary Assessment in 24 hrs Total Acuity:  9-13  []  Secondary Assessment in 24 hrs Total Acuity:  4-8  []  Secondary Assessment in 24 hrs Total Acuity:  0-3  [x]  Secondary Assessment in 48 hrs   HHN AEROSOL THERAPY with  [physician-ordered bronchodilator(s)] q 4 & Albuterol PRN q2 hrs.   Breath-Actuated Neb if BBS Acuity = 4,

## 2024-06-29 NOTE — PROGRESS NOTES
[address] , [place], [place], [address]    Progress Note    Date:   6/29/2024  Patient name:  Kaye Joyce  Date of admission:  6/27/2024  3:17 PM  MRN:   [id number]  YOB: 1945    SUBJECTIVE/Last 24 hours update:     Patient seen and examined at the bed side , no new acute events overnight and, no new complains. VSS, afebrile. Pain is manageable. Notes from nursing staff and Consults had been reviewed, and the overnight progress had been checked with the nursing staff as well.    REVIEW OF SYSTEMS:      CONSTITUTIONAL:  no fevers, no headcahes  EYES: negative for blury vision  HEENT: No headaches, No nasal congestion, no difficulty swallowing  RESPIRATORY:negative for dyspnea, no wheezing, no Cough  CARDIOVASCULAR: negative for chest pain, no palpitations  GASTROINTESTINAL: no nausea, no vomiting, no change in bowel habits, no abdominal pain   GENITOURINARY: negative for dysuria, no hematuria   MUSCULOSKELETAL: no joint pains, no muscle aches, no swelling of joints or extremities, foot and shoulder pain  NEUROLOGICAL: No  Weakness or numbness      PAST MEDICAL HISTORY:      has a past medical history of Arthritis, Blood clot in vein, Brain bleed ([facility]), Cataract, Closed nondisplaced fracture of surgical neck of right humerus with routine healing, DVT (deep venous thrombosis) ([facility]), History of hip replacement, History of total bilateral knee replacement, Hypertension, S/P cardiac cath, and Sleep apnea.    PAST SURGICAL HISTORY:      has a past surgical history that includes joint replacement (Bilateral); Colonoscopy (08/13/2019); Colonoscopy (N/A, 03/28/2023); Upper gastrointestinal endoscopy (N/A, 03/28/2023); Esophagogastroduodenoscopy (03/28/2023); Colonoscopy (03/28/2023); Upper gastrointestinal endoscopy (N/A, 08/23/2023); and Esophagogastroduodenoscopy (08/23/2023).       SOCIAL HISTORY:      reports that she has never smoked. She has never used smokeless tobacco.

## 2024-06-29 NOTE — PROGRESS NOTES
Pt resting in chair, denies needs at this time, vitals and assessment completed. Call light in reach, chair alarm on

## 2024-06-29 NOTE — PROGRESS NOTES
Physical Therapy  Facility/Department: [facility] MED SURG  Daily Treatment Note  NAME: Kaye Joyce  : 1945  MRN: [id number]    Date of Service: 2024    Discharge Recommendations:  Continue to assess pending progress, Subacute/Skilled Nursing Facility     Patient Diagnosis(es): The primary encounter diagnosis was Urinary tract infection without hematuria, site unspecified. Diagnoses of Hypomagnesemia and Fall, initial encounter were also pertinent to this visit.    Assessment   Assessment: Bed mobility:MinAx1 with LE. Transfers:MinAx1. Pt. ambulated 20ftx1 with SC and CGA/MinAx1 for safety. Demoed slow cadance and decreased BLE step length. Pt. reported some \"weakness\" when standing and initiating ambulation. Pt. completed reclined seated BLE therex x15 in all available planes of motion.  Activity Tolerance: Patient tolerated evaluation without incident     Plan    Physical Therapy Plan  General Plan: 2 times a day 7 days a week  Specific Instructions for Next Treatment: 1x/ daily on weekends  Current Treatment Recommendations: Strengthening;ROM;Balance training;Functional mobility training;Transfer training;Gait training;Stair training;Manual;Home exercise program;Pain management;Safety education & training;Patient/Caregiver education & training;Equipment evaluation, education, & procurement;Positioning;Therapeutic activities     Restrictions  Restrictions/Precautions  Restrictions/Precautions: Fall Risk, Weight Bearing, General Precautions, Contact Precautions  Required Braces or Orthoses?: Yes  Upper Extremity Weight Bearing Restrictions  Right Upper Extremity Weight Bearing: Non Weight Bearing  Required Braces or Orthoses  Right Upper Extremity Brace/Splint: Right upper extremity in sling- nursing asked about more of an immobilizer brace, therapist informed nursing to check with central supply     Subjective    Subjective  Subjective: Pt. in bed upon arrival, agreeable to therapy at this time  Pain:

## 2024-06-30 LAB
ALBUMIN SERPL-MCNC: 3.4 G/DL (ref 3.5–5.2)
ALBUMIN/GLOB SERPL: 1.4 {RATIO} (ref 1–2.5)
ALP SERPL-CCNC: 50 U/L (ref 35–104)
ALT SERPL-CCNC: 16 U/L (ref 5–33)
ANION GAP SERPL CALCULATED.3IONS-SCNC: 6 MMOL/L (ref 9–17)
AST SERPL-CCNC: 15 U/L
BASOPHILS # BLD: <0.03 K/UL (ref 0–0.2)
BASOPHILS NFR BLD: 0 % (ref 0–2)
BILIRUB SERPL-MCNC: 0.3 MG/DL (ref 0.3–1.2)
BUN SERPL-MCNC: 19 MG/DL (ref 8–23)
BUN/CREAT SERPL: 19 (ref 9–20)
CALCIUM SERPL-MCNC: 9.9 MG/DL (ref 8.6–10.4)
CHLORIDE SERPL-SCNC: 100 MMOL/L (ref 98–107)
CO2 SERPL-SCNC: 33 MMOL/L (ref 20–31)
CREAT SERPL-MCNC: 1 MG/DL (ref 0.5–0.9)
EOSINOPHIL # BLD: 0.13 K/UL (ref 0–0.44)
EOSINOPHILS RELATIVE PERCENT: 2 % (ref 1–4)
ERYTHROCYTE [DISTWIDTH] IN BLOOD BY AUTOMATED COUNT: 13.9 % (ref 11.8–14.4)
GFR, ESTIMATED: 57 ML/MIN/1.73M2
GLUCOSE SERPL-MCNC: 126 MG/DL (ref 70–99)
HCT VFR BLD AUTO: 39.6 % (ref 36.3–47.1)
HGB BLD-MCNC: 13.3 G/DL (ref 11.9–15.1)
IMM GRANULOCYTES # BLD AUTO: 0.04 K/UL (ref 0–0.3)
IMM GRANULOCYTES NFR BLD: 1 %
LYMPHOCYTES NFR BLD: 0.81 K/UL (ref 1.1–3.7)
LYMPHOCYTES RELATIVE PERCENT: 11 % (ref 24–43)
MAGNESIUM SERPL-MCNC: 1.5 MG/DL (ref 1.6–2.6)
MAGNESIUM SERPL-MCNC: 1.8 MG/DL (ref 1.6–2.6)
MCH RBC QN AUTO: 31.1 PG (ref 25.2–33.5)
MCHC RBC AUTO-ENTMCNC: 33.6 G/DL (ref 28.4–34.8)
MCV RBC AUTO: 92.5 FL (ref 82.6–102.9)
MONOCYTES NFR BLD: 0.64 K/UL (ref 0.1–1.2)
MONOCYTES NFR BLD: 9 % (ref 3–12)
NEUTROPHILS NFR BLD: 77 % (ref 36–65)
NEUTS SEG NFR BLD: 5.74 K/UL (ref 1.5–8.1)
NRBC BLD-RTO: 0 PER 100 WBC
PLATELET # BLD AUTO: 162 K/UL (ref 138–453)
PMV BLD AUTO: 9.6 FL (ref 8.1–13.5)
POTASSIUM SERPL-SCNC: 3.3 MMOL/L (ref 3.7–5.3)
PROT SERPL-MCNC: 5.8 G/DL (ref 6.4–8.3)
RBC # BLD AUTO: 4.28 M/UL (ref 3.95–5.11)
SODIUM SERPL-SCNC: 139 MMOL/L (ref 135–144)
WBC OTHER # BLD: 7.4 K/UL (ref 3.5–11.3)

## 2024-06-30 PROCEDURE — 80053 COMPREHEN METABOLIC PANEL: CPT

## 2024-06-30 PROCEDURE — 6370000000 HC RX 637 (ALT 250 FOR IP): Performed by: INTERNAL MEDICINE

## 2024-06-30 PROCEDURE — 97110 THERAPEUTIC EXERCISES: CPT

## 2024-06-30 PROCEDURE — 6370000000 HC RX 637 (ALT 250 FOR IP)

## 2024-06-30 PROCEDURE — 6360000002 HC RX W HCPCS: Performed by: STUDENT IN AN ORGANIZED HEALTH CARE EDUCATION/TRAINING PROGRAM

## 2024-06-30 PROCEDURE — 97116 GAIT TRAINING THERAPY: CPT

## 2024-06-30 PROCEDURE — 85025 COMPLETE CBC W/AUTO DIFF WBC: CPT

## 2024-06-30 PROCEDURE — 6370000000 HC RX 637 (ALT 250 FOR IP): Performed by: STUDENT IN AN ORGANIZED HEALTH CARE EDUCATION/TRAINING PROGRAM

## 2024-06-30 PROCEDURE — 2580000003 HC RX 258: Performed by: INTERNAL MEDICINE

## 2024-06-30 PROCEDURE — 83735 ASSAY OF MAGNESIUM: CPT

## 2024-06-30 PROCEDURE — 1200000000 HC SEMI PRIVATE

## 2024-06-30 PROCEDURE — 36415 COLL VENOUS BLD VENIPUNCTURE: CPT

## 2024-06-30 PROCEDURE — 94761 N-INVAS EAR/PLS OXIMETRY MLT: CPT

## 2024-06-30 RX ORDER — MAGNESIUM SULFATE IN WATER 40 MG/ML
2000 INJECTION, SOLUTION INTRAVENOUS ONCE
Status: COMPLETED | OUTPATIENT
Start: 2024-06-30 | End: 2024-06-30

## 2024-06-30 RX ADMIN — SODIUM CHLORIDE, PRESERVATIVE FREE 10 ML: 5 INJECTION INTRAVENOUS at 08:17

## 2024-06-30 RX ADMIN — OXYBUTYNIN CHLORIDE 15 MG: 5 TABLET, EXTENDED RELEASE ORAL at 08:10

## 2024-06-30 RX ADMIN — APIXABAN 2.5 MG: 2.5 TABLET, FILM COATED ORAL at 08:10

## 2024-06-30 RX ADMIN — ATENOLOL 100 MG: 50 TABLET ORAL at 08:10

## 2024-06-30 RX ADMIN — PREDNISONE 5 MG: 5 TABLET ORAL at 08:10

## 2024-06-30 RX ADMIN — FERROUS SULFATE TAB 325 MG (65 MG ELEMENTAL FE) 325 MG: 325 (65 FE) TAB at 08:10

## 2024-06-30 RX ADMIN — PANTOPRAZOLE SODIUM 40 MG: 40 TABLET, DELAYED RELEASE ORAL at 06:46

## 2024-06-30 RX ADMIN — SERTRALINE HYDROCHLORIDE 50 MG: 50 TABLET ORAL at 08:10

## 2024-06-30 RX ADMIN — SODIUM CHLORIDE, PRESERVATIVE FREE 10 ML: 5 INJECTION INTRAVENOUS at 20:36

## 2024-06-30 RX ADMIN — OXYCODONE HYDROCHLORIDE AND ACETAMINOPHEN 500 MG: 500 TABLET ORAL at 08:11

## 2024-06-30 RX ADMIN — HYDROCODONE BITARTRATE AND ACETAMINOPHEN 2 TABLET: 5; 325 TABLET ORAL at 20:36

## 2024-06-30 RX ADMIN — APIXABAN 2.5 MG: 2.5 TABLET, FILM COATED ORAL at 20:36

## 2024-06-30 RX ADMIN — PREDNISONE 5 MG: 5 TABLET ORAL at 20:36

## 2024-06-30 RX ADMIN — POTASSIUM CHLORIDE 40 MEQ: 1500 TABLET, EXTENDED RELEASE ORAL at 08:19

## 2024-06-30 RX ADMIN — MAGNESIUM SULFATE HEPTAHYDRATE 2000 MG: 40 INJECTION, SOLUTION INTRAVENOUS at 13:18

## 2024-06-30 RX ADMIN — DOCUSATE SODIUM 100 MG: 100 CAPSULE, LIQUID FILLED ORAL at 20:36

## 2024-06-30 RX ADMIN — DOCUSATE SODIUM 100 MG: 100 CAPSULE, LIQUID FILLED ORAL at 08:11

## 2024-06-30 RX ADMIN — HYDROCODONE BITARTRATE AND ACETAMINOPHEN 2 TABLET: 5; 325 TABLET ORAL at 08:12

## 2024-06-30 RX ADMIN — HYDROCODONE BITARTRATE AND ACETAMINOPHEN 2 TABLET: 5; 325 TABLET ORAL at 16:34

## 2024-06-30 RX ADMIN — CHLORTHALIDONE 25 MG: 25 TABLET ORAL at 08:10

## 2024-06-30 ASSESSMENT — PAIN DESCRIPTION - DESCRIPTORS
DESCRIPTORS: ACHING;DISCOMFORT
DESCRIPTORS: ACHING;DISCOMFORT

## 2024-06-30 ASSESSMENT — PAIN DESCRIPTION - FREQUENCY
FREQUENCY: CONTINUOUS
FREQUENCY: CONTINUOUS

## 2024-06-30 ASSESSMENT — PAIN SCALES - GENERAL
PAINLEVEL_OUTOF10: 7
PAINLEVEL_OUTOF10: 7
PAINLEVEL_OUTOF10: 6
PAINLEVEL_OUTOF10: 3

## 2024-06-30 ASSESSMENT — PAIN DESCRIPTION - ORIENTATION
ORIENTATION: RIGHT
ORIENTATION: RIGHT

## 2024-06-30 ASSESSMENT — PAIN DESCRIPTION - PAIN TYPE
TYPE: ACUTE PAIN
TYPE: ACUTE PAIN

## 2024-06-30 ASSESSMENT — PAIN DESCRIPTION - ONSET: ONSET: ON-GOING

## 2024-06-30 ASSESSMENT — PAIN DESCRIPTION - LOCATION
LOCATION: SHOULDER
LOCATION: SHOULDER

## 2024-06-30 ASSESSMENT — PAIN - FUNCTIONAL ASSESSMENT: PAIN_FUNCTIONAL_ASSESSMENT: PREVENTS OR INTERFERES SOME ACTIVE ACTIVITIES AND ADLS

## 2024-06-30 NOTE — PROGRESS NOTES
Pt in bed watching television and finishing up phone call. VS and assessment as charted. Pt is A&Ox4. Denies any current pain. Sling remains in place. Pt denies any other needs and plan of care reviewed. Pt has call light and bedside table within reach, will continue to monitor.

## 2024-06-30 NOTE — FLOWSHEET NOTE
Resting in bed with C Pap on. Vitals checked and assessment completed. Denies right shoulder in at rest. Sling intact. Good circulation checks to wrist and antecubital. No needs at present time. Call light within reach. Continue to monitor

## 2024-06-30 NOTE — PROGRESS NOTES
[address] , [place], [place], [address]    Progress Note    Date:   6/30/2024  Patient name:  Kaye Joyce  Date of admission:  6/27/2024  3:17 PM  MRN:   [id number]  YOB: 1945    SUBJECTIVE/Last 24 hours update:     Patient seen and examined at the bed side , no new acute events overnight and no new complains noted. VSS, afebrile. She continues to have some arm and LE discomfort. Notes from nursing staff and Consults had been reviewed, and the overnight progress had been checked with the nursing staff as well. Discussed imaging findings with the patient as well as the management plan.    REVIEW OF SYSTEMS:      CONSTITUTIONAL:  no fevers, no headcahes  EYES: negative for blury vision  HEENT: No headaches, No nasal congestion, no difficulty swallowing  RESPIRATORY:negative for dyspnea, no wheezing, no Cough  CARDIOVASCULAR: negative for chest pain, no palpitations  GASTROINTESTINAL: no nausea, no vomiting, no change in bowel habits, no abdominal pain   GENITOURINARY: negative for dysuria, no hematuria   MUSCULOSKELETAL: no joint pains, no muscle aches, no swelling of joints or extremities, foot and shoulder pain (unchanged).  NEUROLOGICAL: No weakness or numbness    PAST MEDICAL HISTORY:      has a past medical history of Arthritis, Blood clot in vein, Brain bleed ([facility]), Cataract, Closed nondisplaced fracture of surgical neck of right humerus with routine healing, DVT (deep venous thrombosis) ([facility]), History of hip replacement, History of total bilateral knee replacement, Hypertension, S/P cardiac cath, and Sleep apnea.    PAST SURGICAL HISTORY:      has a past surgical history that includes joint replacement (Bilateral); Colonoscopy (08/13/2019); Colonoscopy (N/A, 03/28/2023); Upper gastrointestinal endoscopy (N/A, 03/28/2023); Esophagogastroduodenoscopy (03/28/2023); Colonoscopy (03/28/2023); Upper gastrointestinal endoscopy (N/A, 08/23/2023); and

## 2024-06-30 NOTE — FLOWSHEET NOTE
Resting in bed. Vitals rechecked. Denies right shoulder pain currently. Arm in a sling. Good circulation checks. No needs at present time. Call light within reach. Continue to monitor

## 2024-07-01 LAB
ALBUMIN SERPL-MCNC: 3.2 G/DL (ref 3.5–5.2)
ALBUMIN/GLOB SERPL: 1.3 {RATIO} (ref 1–2.5)
ALP SERPL-CCNC: 46 U/L (ref 35–104)
ALT SERPL-CCNC: 15 U/L (ref 5–33)
ANION GAP SERPL CALCULATED.3IONS-SCNC: 6 MMOL/L (ref 9–17)
AST SERPL-CCNC: 18 U/L
BASOPHILS # BLD: 0.03 K/UL (ref 0–0.2)
BASOPHILS NFR BLD: 0 % (ref 0–2)
BILIRUB SERPL-MCNC: 0.3 MG/DL (ref 0.3–1.2)
BUN SERPL-MCNC: 22 MG/DL (ref 8–23)
BUN/CREAT SERPL: 24 (ref 9–20)
CALCIUM SERPL-MCNC: 9.6 MG/DL (ref 8.6–10.4)
CHLORIDE SERPL-SCNC: 103 MMOL/L (ref 98–107)
CO2 SERPL-SCNC: 31 MMOL/L (ref 20–31)
CREAT SERPL-MCNC: 0.9 MG/DL (ref 0.5–0.9)
EOSINOPHIL # BLD: 0.12 K/UL (ref 0–0.44)
EOSINOPHILS RELATIVE PERCENT: 2 % (ref 1–4)
ERYTHROCYTE [DISTWIDTH] IN BLOOD BY AUTOMATED COUNT: 13.8 % (ref 11.8–14.4)
GFR, ESTIMATED: 65 ML/MIN/1.73M2
GLUCOSE SERPL-MCNC: 110 MG/DL (ref 70–99)
HCT VFR BLD AUTO: 38.7 % (ref 36.3–47.1)
HGB BLD-MCNC: 12.9 G/DL (ref 11.9–15.1)
IMM GRANULOCYTES # BLD AUTO: 0.04 K/UL (ref 0–0.3)
IMM GRANULOCYTES NFR BLD: 1 %
LYMPHOCYTES NFR BLD: 0.94 K/UL (ref 1.1–3.7)
LYMPHOCYTES RELATIVE PERCENT: 12 % (ref 24–43)
MCH RBC QN AUTO: 31.2 PG (ref 25.2–33.5)
MCHC RBC AUTO-ENTMCNC: 33.3 G/DL (ref 28.4–34.8)
MCV RBC AUTO: 93.5 FL (ref 82.6–102.9)
MONOCYTES NFR BLD: 0.67 K/UL (ref 0.1–1.2)
MONOCYTES NFR BLD: 8 % (ref 3–12)
NEUTROPHILS NFR BLD: 77 % (ref 36–65)
NEUTS SEG NFR BLD: 6.15 K/UL (ref 1.5–8.1)
NRBC BLD-RTO: 0 PER 100 WBC
PLATELET # BLD AUTO: 157 K/UL (ref 138–453)
PMV BLD AUTO: 9.8 FL (ref 8.1–13.5)
POTASSIUM SERPL-SCNC: 3.9 MMOL/L (ref 3.7–5.3)
PROT SERPL-MCNC: 5.6 G/DL (ref 6.4–8.3)
RBC # BLD AUTO: 4.14 M/UL (ref 3.95–5.11)
SODIUM SERPL-SCNC: 140 MMOL/L (ref 135–144)
WBC OTHER # BLD: 8 K/UL (ref 3.5–11.3)

## 2024-07-01 PROCEDURE — 97110 THERAPEUTIC EXERCISES: CPT

## 2024-07-01 PROCEDURE — 36415 COLL VENOUS BLD VENIPUNCTURE: CPT

## 2024-07-01 PROCEDURE — 94761 N-INVAS EAR/PLS OXIMETRY MLT: CPT

## 2024-07-01 PROCEDURE — 6370000000 HC RX 637 (ALT 250 FOR IP): Performed by: STUDENT IN AN ORGANIZED HEALTH CARE EDUCATION/TRAINING PROGRAM

## 2024-07-01 PROCEDURE — 6370000000 HC RX 637 (ALT 250 FOR IP): Performed by: INTERNAL MEDICINE

## 2024-07-01 PROCEDURE — 2580000003 HC RX 258: Performed by: INTERNAL MEDICINE

## 2024-07-01 PROCEDURE — 85025 COMPLETE CBC W/AUTO DIFF WBC: CPT

## 2024-07-01 PROCEDURE — 97116 GAIT TRAINING THERAPY: CPT

## 2024-07-01 PROCEDURE — 1200000000 HC SEMI PRIVATE

## 2024-07-01 PROCEDURE — 94664 DEMO&/EVAL PT USE INHALER: CPT

## 2024-07-01 PROCEDURE — 80053 COMPREHEN METABOLIC PANEL: CPT

## 2024-07-01 PROCEDURE — 6370000000 HC RX 637 (ALT 250 FOR IP)

## 2024-07-01 RX ORDER — CALCIUM CARBONATE 500 MG/1
1 TABLET, CHEWABLE ORAL 2 TIMES DAILY PRN
Status: DISCONTINUED | OUTPATIENT
Start: 2024-07-01 | End: 2024-07-08 | Stop reason: HOSPADM

## 2024-07-01 RX ORDER — FERROUS SULFATE 325(65) MG
325 TABLET ORAL EVERY OTHER DAY
Status: DISCONTINUED | OUTPATIENT
Start: 2024-07-03 | End: 2024-07-08 | Stop reason: HOSPADM

## 2024-07-01 RX ADMIN — OXYCODONE HYDROCHLORIDE AND ACETAMINOPHEN 500 MG: 500 TABLET ORAL at 09:33

## 2024-07-01 RX ADMIN — CHLORTHALIDONE 25 MG: 25 TABLET ORAL at 09:33

## 2024-07-01 RX ADMIN — PREDNISONE 5 MG: 5 TABLET ORAL at 20:31

## 2024-07-01 RX ADMIN — FERROUS SULFATE TAB 325 MG (65 MG ELEMENTAL FE) 325 MG: 325 (65 FE) TAB at 09:33

## 2024-07-01 RX ADMIN — APIXABAN 2.5 MG: 2.5 TABLET, FILM COATED ORAL at 20:31

## 2024-07-01 RX ADMIN — ATENOLOL 100 MG: 50 TABLET ORAL at 09:33

## 2024-07-01 RX ADMIN — HYDROCODONE BITARTRATE AND ACETAMINOPHEN 2 TABLET: 5; 325 TABLET ORAL at 12:55

## 2024-07-01 RX ADMIN — SERTRALINE HYDROCHLORIDE 50 MG: 50 TABLET ORAL at 09:33

## 2024-07-01 RX ADMIN — PREDNISONE 5 MG: 5 TABLET ORAL at 09:33

## 2024-07-01 RX ADMIN — OXYBUTYNIN CHLORIDE 15 MG: 5 TABLET, EXTENDED RELEASE ORAL at 09:33

## 2024-07-01 RX ADMIN — HYDROCODONE BITARTRATE AND ACETAMINOPHEN 2 TABLET: 5; 325 TABLET ORAL at 02:47

## 2024-07-01 RX ADMIN — DOCUSATE SODIUM 100 MG: 100 CAPSULE, LIQUID FILLED ORAL at 20:31

## 2024-07-01 RX ADMIN — APIXABAN 2.5 MG: 2.5 TABLET, FILM COATED ORAL at 09:34

## 2024-07-01 RX ADMIN — HYDROCODONE BITARTRATE AND ACETAMINOPHEN 2 TABLET: 5; 325 TABLET ORAL at 18:40

## 2024-07-01 RX ADMIN — DOCUSATE SODIUM 100 MG: 100 CAPSULE, LIQUID FILLED ORAL at 09:33

## 2024-07-01 RX ADMIN — SODIUM CHLORIDE, PRESERVATIVE FREE 10 ML: 5 INJECTION INTRAVENOUS at 09:36

## 2024-07-01 RX ADMIN — PANTOPRAZOLE SODIUM 40 MG: 40 TABLET, DELAYED RELEASE ORAL at 07:55

## 2024-07-01 RX ADMIN — HYDROCODONE BITARTRATE AND ACETAMINOPHEN 2 TABLET: 5; 325 TABLET ORAL at 07:55

## 2024-07-01 RX ADMIN — SODIUM CHLORIDE, PRESERVATIVE FREE 10 ML: 5 INJECTION INTRAVENOUS at 20:31

## 2024-07-01 ASSESSMENT — PAIN DESCRIPTION - DESCRIPTORS
DESCRIPTORS: ACHING
DESCRIPTORS: ACHING;THROBBING
DESCRIPTORS: ACHING
DESCRIPTORS: ACHING

## 2024-07-01 ASSESSMENT — PAIN - FUNCTIONAL ASSESSMENT
PAIN_FUNCTIONAL_ASSESSMENT: ACTIVITIES ARE NOT PREVENTED

## 2024-07-01 ASSESSMENT — PAIN DESCRIPTION - LOCATION
LOCATION: LEG

## 2024-07-01 ASSESSMENT — PAIN DESCRIPTION - ORIENTATION
ORIENTATION: LEFT
ORIENTATION: LEFT;LOWER
ORIENTATION: LEFT;LOWER
ORIENTATION: LEFT
ORIENTATION: LEFT
ORIENTATION: LEFT;LOWER

## 2024-07-01 ASSESSMENT — PAIN DESCRIPTION - ONSET
ONSET: ON-GOING

## 2024-07-01 ASSESSMENT — PAIN SCALES - GENERAL
PAINLEVEL_OUTOF10: 9
PAINLEVEL_OUTOF10: 8
PAINLEVEL_OUTOF10: 5
PAINLEVEL_OUTOF10: 0
PAINLEVEL_OUTOF10: 3
PAINLEVEL_OUTOF10: 9
PAINLEVEL_OUTOF10: 3

## 2024-07-01 ASSESSMENT — PAIN DESCRIPTION - PAIN TYPE
TYPE: ACUTE PAIN

## 2024-07-01 ASSESSMENT — PAIN DESCRIPTION - FREQUENCY
FREQUENCY: CONTINUOUS

## 2024-07-01 NOTE — PROGRESS NOTES
Progress Note    SUBJECTIVE:    Patient seen for f/u of Closed nondisplaced fracture of surgical neck of right humerus with routine healing.  She resting in bed no distress.  Continues to work with PT.     ROS:   Constitutional: negative  for fevers, and negative for chills.  Respiratory: negative for shortness of breath, negative for cough, and negative for wheezing  Cardiovascular: negative for chest pain, and negative for palpitations  Gastrointestinal: negative for abdominal pain, negative for nausea,negative for vomiting, negative for diarrhea, and negative for constipation     All other systems were reviewed with the patient and are negative unless otherwise stated in HPI      OBJECTIVE:      Vitals:   Vitals:    07/01/24 0330   BP:    Pulse:    Resp: 18   Temp:    SpO2:      Weight - Scale: 101.4 kg (223 lb 8.7 oz)   Height: 165.1 cm (5' 5\")     Weight  Wt Readings from Last 3 Encounters:   07/01/24 101.4 kg (223 lb 8.7 oz)   05/13/24 98.9 kg (218 lb)   04/11/24 98.6 kg (217 lb 4.8 oz)     Body mass index is 37.2 kg/m².    24HR INTAKE/OUTPUT:      Intake/Output Summary (Last 24 hours) at 7/1/2024 0713  Last data filed at 7/1/2024 0459  Gross per 24 hour   Intake 1200 ml   Output 250 ml   Net 950 ml     -----------------------------------------------------------------  Exam:    GEN:    Awake, alert and oriented x3.   EYES:  EOMI, pupils equal   NECK: Supple. No lymphadenopathy.  No carotid bruit  CVS:    regular rate and rhythm, no audible murmur  PULM:  CTA, no wheezes, rales or rhonchi, no acute respiratory distress  ABD:    Bowels sounds normal.  Abdomen is soft.  No distention.  no tenderness to palpation.   EXT:   no edema bilaterally .  No calf tenderness. Right arm in a sling.  Pulse palpable.  Circ Checks WNL  NEURO: Moves all extremities.  Motor and sensory are grossly intact  SKIN:  No rashes.  No skin lesions.    -----------------------------------------------------------------    Diagnostic  AM

## 2024-07-01 NOTE — PROGRESS NOTES
Waiting on insurance approval to go to the [place].    Alejandra Solorio, [facility]

## 2024-07-01 NOTE — PROGRESS NOTES
Physical Therapy  Facility/Department: [facility] MED SURG  Daily Treatment Note  NAME: Kaye Joyce  : 1945  MRN: [id number]    Date of Service: 2024    Discharge Recommendations:  Continue to assess pending progress, Subacute/Skilled Nursing Facility       Patient Diagnosis(es): The primary encounter diagnosis was Urinary tract infection without hematuria, site unspecified. Diagnoses of Hypomagnesemia and Fall, initial encounter were also pertinent to this visit.    Assessment   Assessment: Transfers:CGA. Bed mobility:CGA/MinAx1. Additional time needed during transfers. Commode use and transfer. Pt. ambulated 35ftx1 with SC and CGA for safety. Demoed slow cadance and decreased BLE step length. No LOB noted. Completed reclined seated BLE therex x15 in all available planes of motion.  Activity Tolerance: Patient tolerated evaluation without incident     Plan    Physical Therapy Plan  General Plan: 2 times a day 7 days a week  Specific Instructions for Next Treatment: 1x/ daily on weekends  Current Treatment Recommendations: Strengthening;ROM;Balance training;Functional mobility training;Transfer training;Gait training;Stair training;Manual;Home exercise program;Pain management;Safety education & training;Patient/Caregiver education & training;Equipment evaluation, education, & procurement;Positioning;Therapeutic activities     Restrictions  Restrictions/Precautions  Restrictions/Precautions: Fall Risk, Weight Bearing, General Precautions, Contact Precautions  Required Braces or Orthoses?: Yes  Upper Extremity Weight Bearing Restrictions  Right Upper Extremity Weight Bearing: Non Weight Bearing  Required Braces or Orthoses  Right Upper Extremity Brace/Splint: Right upper extremity in sling- nursing asked about more of an immobilizer brace, therapist informed nursing to check with central supply     Subjective    Subjective  Subjective: Pt. in bed upon arrival, agreeable to therapy at this time  Pain: Pt.

## 2024-07-01 NOTE — PROGRESS NOTES
RESPIRATORY ASSESSMENT PROTOCOL                                                                                              Patient Name: Kaye Joyce Room#: 0312/0312-01 : 1945     Admitting diagnosis: Hypomagnesemia [E83.42]  UTI (urinary tract infection) [N39.0]  Fall, initial encounter [W19.XXXA]  Urinary tract infection without hematuria, site unspecified [N39.0]       Medical History:   Past Medical History:   Diagnosis Date    Arthritis     Blood clot in vein 2022    on Xarelto for two months per patient    Brain bleed (HCC)     Cataract     Closed nondisplaced fracture of surgical neck of right humerus with routine healing 2022    DVT (deep venous thrombosis) ([facility])     History of hip replacement     History of total bilateral knee replacement     Hypertension     S/P cardiac cath 2023    [facility]/Dr. Montiel/Right Radial    Sleep apnea        PATIENT ASSESSMENT    LABORATORY DATA  Hematology:   Lab Results   Component Value Date/Time    WBC 8.0 2024 05:40 AM    RBC 4.14 2024 05:40 AM    RBC 4.67 2024 10:30 AM    HGB 12.9 2024 05:40 AM    HCT 38.7 2024 05:40 AM     2024 05:40 AM     Chemistry:  No results found for: \"PHART\", \"VMB6JOX\", \"PO2ART\", \"J6FPLRRR\", \"ZLX0TXZ\", \"PBEA\", \"NBEA\"    VITALS  Pulse: 56   Respirations: 17  BP: 124/69  SpO2: 94 % O2 Device: None (Room air)  Temp: 97.8 °F (36.6 °C)    SKIN COLOR  [x] Normal  [] Pale  [] Dusky  [] Cyanotic    RESPIRATORY PATTERN  [x] Normal  [] Dyspnea  [] Cheyne-Melendez  [] Kussmaul  [] Biots    AMBULATORY  [x] Yes  [] No  [] With Assistance    Patient Acuity 0 1 2 3 4 Score   Level of Consciousness (LOC) [x]  Alert & Oriented or Pt normal LOC []  Confused;follows directions []  Confused & uncooper-ative []  Obtunded []  Comatose 0   Respiratory Rate  (RR) [x]  Reg. rate & pattern. 12 - 20 bpm  []  Increased RR. Greater than 20 bpm   []  SOB w/ exertion or RR greater than 24 bpm  []  Access- ory muscle use at rest. Abn.  resp. []  SOB at rest.   0   Bilateral Breath Sounds (BBS) [x]  Clear []  Diminish-ed bases  []  Diminish-ed t/o, or rales   []  Sporadic, scattered wheezes or rhonchi []  Persistentwheezes and, or absent BBS 0   Cough [x]  Strong, effective, & non-prod. []  Effective & prod. Less than 25 ml (2 TBSP) over past 24 hrs []  Ineffective & non-prod to less than 25 ML over past 24 hrs []  Ineffective and, or greater than 25 ml sputum prod. past 24 hrs. []  Nonspon- taneous; Requires suctioning 0   Pulmonary History  (PULM HX) [x]  No smoking and no chronic pulmonary history []  Former smoker. Quit over 12 mos. ago []  Current smoker or quit w/ in 12 mos []  Pulm. History and, or 20 pk/yr smoking hx []  Admitted w/ acute pulm. dx and, or has been admitted w/ pulm. dx 2 or more times over past 12 mos 0   Surgical History this Admit  (SURG HX) [x]  No surgery []  General surgery []  Lower abdominal []  Thoracic or upper abdominal   []  Thoracic w/ pulm. disease 0   Chest X-Ray (CXR)/CT Scan [x]  Clear or not applicable []  Not available []  Atelectasis or pleural effusions []  Localized infiltrate or pulm. edema []  Con-solidated Infiltrates, bilateral, or in more than 1 lobe 0   TOTAL ACUITY: 0       CARE PLAN    If Acuity Level is 2, 3, or 4 in any of the following:    [] BILATERAL BREATH SOUNDS (BBS)     [] PULMONARY HISTORY (PULM HX)  [] Respiratory Rate  (RR)    Goal: Improve respiratory functions in patients with airway disease and decrease WOB    [] AEROSOL PROTOCOL    Total Acuity:   14-28  []  Secondary Assessment in 24 hrs Total Acuity:  9-13  []  Secondary Assessment in 24 hrs Total Acuity:  4-8  []  Secondary Assessment in 24 hrs Total Acuity:  0-3  []  Secondary Assessment in 48 hrs   [facility] AEROSOL THERAPY with  [physician-ordered bronchodilator(s)] q 4 & Albuterol PRN q2 hrs.   Breath-Actuated Neb if BBS Acuity = 4, and pt. can use MP. Notify physician if condition

## 2024-07-01 NOTE — CARE COORDINATION
07/01/24 1203   IMM Letter   IMM Letter given to Patient/Family/Significant other/Guardian/POA/by: second-patient/ ESTELLA BA   IMM Letter date given: 07/01/24   IMM Letter time given: 1157     MEJIA Snyder

## 2024-07-01 NOTE — PLAN OF CARE
Problem: Discharge Planning  Goal: Discharge to home or other facility with appropriate resources  Outcome: Progressing  Flowsheets (Taken 7/1/2024 1558)  Discharge to home or other facility with appropriate resources:   Identify barriers to discharge with patient and caregiver   Arrange for needed discharge resources and transportation as appropriate     Problem: Pain  Goal: Verbalizes/displays adequate comfort level or baseline comfort level  Outcome: Progressing  Flowsheets (Taken 7/1/2024 1558)  Verbalizes/displays adequate comfort level or baseline comfort level:   Encourage patient to monitor pain and request assistance   Assess pain using appropriate pain scale   Administer analgesics based on type and severity of pain and evaluate response   Implement non-pharmacological measures as appropriate and evaluate response  Note: Pain assessed every four hours and as needed using 0-10 pain scale. Pt educated on scale and uses scale appropriately. Encourage pt to notify staff of pain before pain becomes uncontrollable. Correlate periods of heavy activity after pain medication administration. Use pharmacological and non pharmacological methods for pain relief such as: warm blankets, ice, television, reading, or rest.        Problem: Safety - Adult  Goal: Free from fall injury  Outcome: Progressing  Flowsheets (Taken 7/1/2024 1558)  Free From Fall Injury: Instruct family/caregiver on patient safety  Note: Call light in reach at all times, frequent checks, bed in lowest position, wheels of bed and chair locked, non skid footwear on, appropriate transfer techniques, personal items within reach, walkways free of obstructions, fall risk armband and sign displayed, Coy fall risk score per protocol. No falls this shift, care ongoing.       Problem: Chronic Conditions and Co-morbidities  Goal: Patient's chronic conditions and co-morbidity symptoms are monitored and maintained or improved  Outcome: Progressing  Flowsheets  (Taken 7/1/2024 7933)  Care Plan - Patient's Chronic Conditions and Co-Morbidity Symptoms are Monitored and Maintained or Improved:   Monitor and assess patient's chronic conditions and comorbid symptoms for stability, deterioration, or improvement   Collaborate with multidisciplinary team to address chronic and comorbid conditions and prevent exacerbation or deterioration     Problem: Skin/Tissue Integrity  Goal: Absence of new skin breakdown  Description: 1.  Monitor for areas of redness and/or skin breakdown  2.  Assess vascular access sites hourly  3.  Every 4-6 hours minimum:  Change oxygen saturation probe site  4.  Every 4-6 hours:  If on nasal continuous positive airway pressure, respiratory therapy assess nares and determine need for appliance change or resting period.  Outcome: Progressing  Note: Agustín scale monitoring per protocol. Inspect skin for breakdown frequently. Encourage pt to make frequent large adjustments in position or assist patient with turning. Document all areas of breakdown.

## 2024-07-01 NOTE — PROGRESS NOTES
Writer to bedside to complete morning assessment. Upon entry to room, pt in bed awake, respirations even and unlabored while on room air. Vitals obtained and assessment completed, see flow sheet for details. Pt is A&Ox4. Pt complaining of pain but declines any prn pain medications at this time. Lung sounds are clear throughout. Bilateral lower extremities have trace, non-pitting edema. Right arm is in sling with good radial pulses. Pt updated on plan of care and whiteboard updated. Pt denies further needs from writer at this time. Call light in reach. Care ongoing.

## 2024-07-01 NOTE — PROGRESS NOTES
Physical Therapy  Facility/Department: [facility] MED SURG  Daily Treatment Note  NAME: Kaye Joyce  : 1945  MRN: [id number]    Date of Service: 2024    Discharge Recommendations:  Continue to assess pending progress, Subacute/Skilled Nursing Facility     Patient Diagnosis(es): The primary encounter diagnosis was Urinary tract infection without hematuria, site unspecified. Diagnoses of Hypomagnesemia and Fall, initial encounter were also pertinent to this visit.    Assessment   Assessment: Therapist and SARAH repositioned pt. sling as she felt she wasn't getting much support and had discomfort. Bed mobility:Daniel/CGA. Pt. agreeable to sit in the chair for lunch and reported that she had ambulated to and from bathroom before treatment. Transfers:CGA. Pt. ambulated 6ftx1 with SC and CGA for safety. Completed reclined and seated BLE therex x15 in all available planes of motion. SLR x10.  Activity Tolerance: Patient tolerated evaluation without incident     Plan    Physical Therapy Plan  General Plan: 2 times a day 7 days a week  Specific Instructions for Next Treatment: 1x/ daily on weekends  Current Treatment Recommendations: Strengthening;ROM;Balance training;Functional mobility training;Transfer training;Gait training;Stair training;Manual;Home exercise program;Pain management;Safety education & training;Patient/Caregiver education & training;Equipment evaluation, education, & procurement;Positioning;Therapeutic activities     Restrictions  Restrictions/Precautions  Restrictions/Precautions: Fall Risk, Weight Bearing, General Precautions, Contact Precautions  Required Braces or Orthoses?: Yes  Upper Extremity Weight Bearing Restrictions  Right Upper Extremity Weight Bearing: Non Weight Bearing  Required Braces or Orthoses  Right Upper Extremity Brace/Splint: Right upper extremity in sling- nursing asked about more of an immobilizer brace, therapist informed nursing to check with central supply     Subjective     Subjective  Subjective: Pt. in bed upon arrival, agreeable to therapy at this time  Pain: didn't report  Orientation  Overall Orientation Status: Within Functional Limits     Objective   Bed Mobility Training  Bed Mobility Training: Yes  Overall Level of Assistance: Minimum assistance;Contact-guard assistance;Assist X1  Rolling: Contact-guard assistance  Supine to Sit: Contact-guard assistance;Assist X1;Minimum assistance  Scooting: Stand-by assistance  Transfer Training  Transfer Training: Yes  Overall Level of Assistance: Contact-guard assistance;Assist X1  Interventions: Verbal cues  Sit to Stand: Contact-guard assistance;Assist X1  Stand to Sit: Contact-guard assistance;Assist X1  Bed to Chair: Contact-guard assistance;Assist X1  Toilet Transfer: Contact-guard assistance;Assist X1  Gait  Gait Training: Yes  Overall Level of Assistance: Contact-guard assistance;Assist X1  Distance (ft): 6 Feet  Assistive Device: Cane, straight  Interventions: Safety awareness training  Speed/Tatiana: Slow  Step Length: Left shortened  Gait Abnormalities: Antalgic  PT Exercises  Exercise Treatment: Reclined and seated BLE therex x15 in all available planes of motion. SLR x10.  Safety Devices  Type of Devices: Left in chair;Call light within reach;Nurse notified;Chair alarm in place       Goals  Short Term Goals  Time Frame for Short Term Goals: 10 visits  Short Term Goal 1: Patient will complete bed mobility and transfers with SBAx1 in order to ease functional mobility  Short Term Goal 2: Patient will ambulate 50 feetx1 with SC and CGAx1 in order to ease functional mobility  Short Term Goal 3: Patient will ascend/descend three steps with handrail and CGAX1 in order to safely enter/exit the home  Short Term Goal 4: Patient will tolerate 20-30' ther-ex in order to increase enduarance and ease ADLs    Education  Patient Education  Education Given To: Patient  Education Provided: Role of Therapy;Plan of Care;Equipment  Education

## 2024-07-02 LAB
ALBUMIN SERPL-MCNC: 3.4 G/DL (ref 3.5–5.2)
ALBUMIN/GLOB SERPL: 1.4 {RATIO} (ref 1–2.5)
ALP SERPL-CCNC: 53 U/L (ref 35–104)
ALT SERPL-CCNC: 14 U/L (ref 5–33)
ANION GAP SERPL CALCULATED.3IONS-SCNC: 7 MMOL/L (ref 9–17)
AST SERPL-CCNC: 14 U/L
BASOPHILS # BLD: 0.03 K/UL (ref 0–0.2)
BASOPHILS NFR BLD: 0 % (ref 0–2)
BILIRUB SERPL-MCNC: 0.3 MG/DL (ref 0.3–1.2)
BUN SERPL-MCNC: 24 MG/DL (ref 8–23)
BUN/CREAT SERPL: 24 (ref 9–20)
CALCIUM SERPL-MCNC: 9.8 MG/DL (ref 8.6–10.4)
CHLORIDE SERPL-SCNC: 101 MMOL/L (ref 98–107)
CO2 SERPL-SCNC: 32 MMOL/L (ref 20–31)
CREAT SERPL-MCNC: 1 MG/DL (ref 0.5–0.9)
EOSINOPHIL # BLD: 0.12 K/UL (ref 0–0.44)
EOSINOPHILS RELATIVE PERCENT: 2 % (ref 1–4)
ERYTHROCYTE [DISTWIDTH] IN BLOOD BY AUTOMATED COUNT: 14.1 % (ref 11.8–14.4)
GFR, ESTIMATED: 57 ML/MIN/1.73M2
GLUCOSE SERPL-MCNC: 110 MG/DL (ref 70–99)
HCT VFR BLD AUTO: 41 % (ref 36.3–47.1)
HGB BLD-MCNC: 13.3 G/DL (ref 11.9–15.1)
IMM GRANULOCYTES # BLD AUTO: 0.04 K/UL (ref 0–0.3)
IMM GRANULOCYTES NFR BLD: 1 %
LYMPHOCYTES NFR BLD: 1.01 K/UL (ref 1.1–3.7)
LYMPHOCYTES RELATIVE PERCENT: 13 % (ref 24–43)
MCH RBC QN AUTO: 30.4 PG (ref 25.2–33.5)
MCHC RBC AUTO-ENTMCNC: 32.4 G/DL (ref 28.4–34.8)
MCV RBC AUTO: 93.8 FL (ref 82.6–102.9)
MONOCYTES NFR BLD: 0.69 K/UL (ref 0.1–1.2)
MONOCYTES NFR BLD: 9 % (ref 3–12)
NEUTROPHILS NFR BLD: 75 % (ref 36–65)
NEUTS SEG NFR BLD: 5.79 K/UL (ref 1.5–8.1)
NRBC BLD-RTO: 0 PER 100 WBC
PLATELET # BLD AUTO: 171 K/UL (ref 138–453)
PMV BLD AUTO: 9.4 FL (ref 8.1–13.5)
POTASSIUM SERPL-SCNC: 3.8 MMOL/L (ref 3.7–5.3)
PROT SERPL-MCNC: 5.9 G/DL (ref 6.4–8.3)
RBC # BLD AUTO: 4.37 M/UL (ref 3.95–5.11)
SODIUM SERPL-SCNC: 140 MMOL/L (ref 135–144)
WBC OTHER # BLD: 7.7 K/UL (ref 3.5–11.3)

## 2024-07-02 PROCEDURE — 97116 GAIT TRAINING THERAPY: CPT

## 2024-07-02 PROCEDURE — 36415 COLL VENOUS BLD VENIPUNCTURE: CPT

## 2024-07-02 PROCEDURE — 80053 COMPREHEN METABOLIC PANEL: CPT

## 2024-07-02 PROCEDURE — 6370000000 HC RX 637 (ALT 250 FOR IP): Performed by: STUDENT IN AN ORGANIZED HEALTH CARE EDUCATION/TRAINING PROGRAM

## 2024-07-02 PROCEDURE — 94761 N-INVAS EAR/PLS OXIMETRY MLT: CPT

## 2024-07-02 PROCEDURE — 1200000000 HC SEMI PRIVATE

## 2024-07-02 PROCEDURE — 2580000003 HC RX 258: Performed by: INTERNAL MEDICINE

## 2024-07-02 PROCEDURE — 97110 THERAPEUTIC EXERCISES: CPT

## 2024-07-02 PROCEDURE — 6370000000 HC RX 637 (ALT 250 FOR IP): Performed by: INTERNAL MEDICINE

## 2024-07-02 PROCEDURE — 6370000000 HC RX 637 (ALT 250 FOR IP)

## 2024-07-02 PROCEDURE — 85025 COMPLETE CBC W/AUTO DIFF WBC: CPT

## 2024-07-02 RX ORDER — TIZANIDINE 2 MG/1
2 TABLET ORAL EVERY 6 HOURS PRN
DISCHARGE
Start: 2024-07-02

## 2024-07-02 RX ORDER — HYDROCODONE BITARTRATE AND ACETAMINOPHEN 5; 325 MG/1; MG/1
2 TABLET ORAL EVERY 4 HOURS PRN
Qty: 36 TABLET | Refills: 0 | Status: SHIPPED | OUTPATIENT
Start: 2024-07-02 | End: 2024-07-08 | Stop reason: HOSPADM

## 2024-07-02 RX ORDER — POLYETHYLENE GLYCOL 3350 17 G/17G
17 POWDER, FOR SOLUTION ORAL DAILY PRN
Qty: 527 G | Refills: 1 | DISCHARGE
Start: 2024-07-02 | End: 2024-08-01

## 2024-07-02 RX ORDER — FERROUS SULFATE 325(65) MG
325 TABLET ORAL EVERY OTHER DAY
Qty: 30 TABLET | Refills: 3 | DISCHARGE
Start: 2024-07-03

## 2024-07-02 RX ORDER — PSEUDOEPHEDRINE HCL 30 MG
100 TABLET ORAL 2 TIMES DAILY
DISCHARGE
Start: 2024-07-02

## 2024-07-02 RX ADMIN — HYDROCODONE BITARTRATE AND ACETAMINOPHEN 2 TABLET: 5; 325 TABLET ORAL at 19:41

## 2024-07-02 RX ADMIN — SERTRALINE HYDROCHLORIDE 50 MG: 50 TABLET ORAL at 08:55

## 2024-07-02 RX ADMIN — HYDROCODONE BITARTRATE AND ACETAMINOPHEN 2 TABLET: 5; 325 TABLET ORAL at 15:38

## 2024-07-02 RX ADMIN — HYDROCODONE BITARTRATE AND ACETAMINOPHEN 2 TABLET: 5; 325 TABLET ORAL at 07:29

## 2024-07-02 RX ADMIN — PANTOPRAZOLE SODIUM 40 MG: 40 TABLET, DELAYED RELEASE ORAL at 08:58

## 2024-07-02 RX ADMIN — HYDROCODONE BITARTRATE AND ACETAMINOPHEN 2 TABLET: 5; 325 TABLET ORAL at 11:48

## 2024-07-02 RX ADMIN — CHLORTHALIDONE 25 MG: 25 TABLET ORAL at 08:57

## 2024-07-02 RX ADMIN — APIXABAN 2.5 MG: 2.5 TABLET, FILM COATED ORAL at 08:56

## 2024-07-02 RX ADMIN — HYDROCODONE BITARTRATE AND ACETAMINOPHEN 2 TABLET: 5; 325 TABLET ORAL at 03:11

## 2024-07-02 RX ADMIN — SODIUM CHLORIDE, PRESERVATIVE FREE 10 ML: 5 INJECTION INTRAVENOUS at 20:36

## 2024-07-02 RX ADMIN — SODIUM CHLORIDE, PRESERVATIVE FREE 10 ML: 5 INJECTION INTRAVENOUS at 09:12

## 2024-07-02 RX ADMIN — HYDROCODONE BITARTRATE AND ACETAMINOPHEN 2 TABLET: 5; 325 TABLET ORAL at 23:48

## 2024-07-02 RX ADMIN — DOCUSATE SODIUM 100 MG: 100 CAPSULE, LIQUID FILLED ORAL at 20:35

## 2024-07-02 RX ADMIN — OXYCODONE HYDROCHLORIDE AND ACETAMINOPHEN 500 MG: 500 TABLET ORAL at 08:55

## 2024-07-02 RX ADMIN — ATENOLOL 100 MG: 50 TABLET ORAL at 08:55

## 2024-07-02 RX ADMIN — OXYBUTYNIN CHLORIDE 15 MG: 5 TABLET, EXTENDED RELEASE ORAL at 08:57

## 2024-07-02 RX ADMIN — PREDNISONE 5 MG: 5 TABLET ORAL at 08:57

## 2024-07-02 RX ADMIN — TIZANIDINE 2 MG: 4 TABLET ORAL at 22:22

## 2024-07-02 RX ADMIN — APIXABAN 2.5 MG: 2.5 TABLET, FILM COATED ORAL at 20:35

## 2024-07-02 RX ADMIN — TIZANIDINE 2 MG: 4 TABLET ORAL at 08:58

## 2024-07-02 RX ADMIN — DOCUSATE SODIUM 100 MG: 100 CAPSULE, LIQUID FILLED ORAL at 08:57

## 2024-07-02 RX ADMIN — PREDNISONE 5 MG: 5 TABLET ORAL at 20:35

## 2024-07-02 ASSESSMENT — PAIN SCALES - GENERAL
PAINLEVEL_OUTOF10: 8
PAINLEVEL_OUTOF10: 9
PAINLEVEL_OUTOF10: 2
PAINLEVEL_OUTOF10: 3
PAINLEVEL_OUTOF10: 7
PAINLEVEL_OUTOF10: 6
PAINLEVEL_OUTOF10: 9
PAINLEVEL_OUTOF10: 8
PAINLEVEL_OUTOF10: 0
PAINLEVEL_OUTOF10: 10
PAINLEVEL_OUTOF10: 2
PAINLEVEL_OUTOF10: 0
PAINLEVEL_OUTOF10: 6
PAINLEVEL_OUTOF10: 8

## 2024-07-02 ASSESSMENT — PAIN DESCRIPTION - ONSET
ONSET: ON-GOING

## 2024-07-02 ASSESSMENT — PAIN DESCRIPTION - ORIENTATION
ORIENTATION: RIGHT
ORIENTATION: LEFT
ORIENTATION: RIGHT
ORIENTATION: LEFT
ORIENTATION: RIGHT
ORIENTATION: RIGHT
ORIENTATION: LEFT
ORIENTATION: RIGHT
ORIENTATION: RIGHT

## 2024-07-02 ASSESSMENT — PAIN DESCRIPTION - DESCRIPTORS
DESCRIPTORS: ACHING
DESCRIPTORS: SPASM;SHOOTING
DESCRIPTORS: SHARP
DESCRIPTORS: THROBBING
DESCRIPTORS: SHARP
DESCRIPTORS: SHARP
DESCRIPTORS: ACHING
DESCRIPTORS: ACHING
DESCRIPTORS: SHARP
DESCRIPTORS: ACHING
DESCRIPTORS: ACHING

## 2024-07-02 ASSESSMENT — PAIN DESCRIPTION - LOCATION
LOCATION: SHOULDER
LOCATION: LEG
LOCATION: LEG
LOCATION: SHOULDER
LOCATION: SHOULDER
LOCATION: LEG
LOCATION: SHOULDER

## 2024-07-02 ASSESSMENT — PAIN - FUNCTIONAL ASSESSMENT
PAIN_FUNCTIONAL_ASSESSMENT: PREVENTS OR INTERFERES SOME ACTIVE ACTIVITIES AND ADLS
PAIN_FUNCTIONAL_ASSESSMENT: PREVENTS OR INTERFERES WITH MANY ACTIVE NOT PASSIVE ACTIVITIES
PAIN_FUNCTIONAL_ASSESSMENT: PREVENTS OR INTERFERES SOME ACTIVE ACTIVITIES AND ADLS

## 2024-07-02 ASSESSMENT — PAIN DESCRIPTION - FREQUENCY
FREQUENCY: CONTINUOUS

## 2024-07-02 ASSESSMENT — PAIN DESCRIPTION - PAIN TYPE
TYPE: SURGICAL PAIN
TYPE: CHRONIC PAIN
TYPE: CHRONIC PAIN

## 2024-07-02 NOTE — PROGRESS NOTES
Physical Therapy  Facility/Department: [facility] MED SURG  Daily Treatment Note  NAME: Kaye Joyce  : 1945  MRN: [id number]    Date of Service: 2024    Discharge Recommendations:  Continue to assess pending progress, Subacute/Skilled Nursing Facility     Patient Diagnosis(es): The primary encounter diagnosis was Urinary tract infection without hematuria, site unspecified. Diagnoses of Hypomagnesemia, Fall, initial encounter, and Closed nondisplaced fracture of surgical neck of right humerus with routine healing, unspecified fracture morphology, subsequent encounter were also pertinent to this visit.    Assessment   Assessment: Transfers:CGA. Pt. ambulated 15ftx2, 20ftx1 in room with SC and CGA for safety. Demoed slow cadance and increased pain when ambulating in L LE. Commode use and transfer. Pt. completed reclined and seated BLE therex x15 in all available planes of motion. SLR x10. Bed mobility:Daniel/CGA for BLE.  Activity Tolerance: Patient tolerated evaluation without incident     Plan    Physical Therapy Plan  General Plan: 2 times a day 7 days a week  Specific Instructions for Next Treatment: 1x/ daily on weekends  Current Treatment Recommendations: Strengthening;ROM;Balance training;Functional mobility training;Transfer training;Gait training;Stair training;Manual;Home exercise program;Pain management;Safety education & training;Patient/Caregiver education & training;Equipment evaluation, education, & procurement;Positioning;Therapeutic activities     Restrictions  Restrictions/Precautions  Restrictions/Precautions: Fall Risk, Weight Bearing, General Precautions, Contact Precautions  Required Braces or Orthoses?: Yes  Upper Extremity Weight Bearing Restrictions  Right Upper Extremity Weight Bearing: Non Weight Bearing  Required Braces or Orthoses  Right Upper Extremity Brace/Splint: Right upper extremity in sling- nursing asked about more of an immobilizer brace, therapist informed nursing to check

## 2024-07-02 NOTE — PROGRESS NOTES
Entered room and patient is resting in the bed. Vitals and assessment completed at this time, see flowsheet for details. A&O x4. Patient rated pain 8/10 in left leg, patient requested PRN pain medication but it is not time to be given. Lung sounds clear throughout. Sling and swath readjusted at this time. Assisted patient with putting on CPAP. Patient denies any further needs at this time. Call light in reach and bed alarm on.

## 2024-07-02 NOTE — PROGRESS NOTES
Pt educated on the use of the sling and swath and the proper placement. Therapy came in and adjusted it. The swath is snug but pt states it gabriella it better than it did. Pt educated on the process of SNF placement and progression through rehab as well as therapy goals. Pt is from home alone. Pt stated she feels better after discussing the process so she knows what to expect and was thankful for our discussion. Pt remains in the chair with call light and bedside table in reach.

## 2024-07-02 NOTE — PLAN OF CARE
Problem: Discharge Planning  Goal: Discharge to home or other facility with appropriate resources  7/2/2024 0810 by Guanako Ernst RN  Outcome: Progressing  7/1/2024 2200 by Ivana Loyd RN  Outcome: Progressing  Flowsheets (Taken 7/1/2024 2200)  Discharge to home or other facility with appropriate resources:   Identify barriers to discharge with patient and caregiver   Arrange for needed discharge resources and transportation as appropriate   Identify discharge learning needs (meds, wound care, etc)   Refer to discharge planning if patient needs post-hospital services based on physician order or complex needs related to functional status, cognitive ability or social support system     Problem: Pain  Goal: Verbalizes/displays adequate comfort level or baseline comfort level  7/2/2024 0810 by Guanako Ernst RN  Outcome: Progressing  Flowsheets (Taken 7/2/2024 0717)  Verbalizes/displays adequate comfort level or baseline comfort level: Encourage patient to monitor pain and request assistance  7/1/2024 2200 by Ivana Loyd RN  Outcome: Progressing  Flowsheets (Taken 7/1/2024 2200)  Verbalizes/displays adequate comfort level or baseline comfort level:   Encourage patient to monitor pain and request assistance   Assess pain using appropriate pain scale   Administer analgesics based on type and severity of pain and evaluate response   Implement non-pharmacological measures as appropriate and evaluate response   Notify Licensed Independent Practitioner if interventions unsuccessful or patient reports new pain     Problem: Safety - Adult  Goal: Free from fall injury  7/2/2024 0810 by Guanako Ernst RN  Outcome: Progressing  Flowsheets (Taken 7/2/2024 0809)  Free From Fall Injury: Instruct family/caregiver on patient safety  7/1/2024 2200 by Ivana Loyd RN  Outcome: Progressing  Flowsheets (Taken 7/1/2024 2200)  Free From Fall Injury: Instruct family/caregiver on patient safety      Problem: Chronic Conditions and Co-morbidities  Goal: Patient's chronic conditions and co-morbidity symptoms are monitored and maintained or improved  7/2/2024 0810 by Guanako Ernst RN  Outcome: Progressing  7/1/2024 2200 by Ivana Loyd RN  Outcome: Progressing  Flowsheets (Taken 7/1/2024 2200)  Care Plan - Patient's Chronic Conditions and Co-Morbidity Symptoms are Monitored and Maintained or Improved:   Monitor and assess patient's chronic conditions and comorbid symptoms for stability, deterioration, or improvement   Collaborate with multidisciplinary team to address chronic and comorbid conditions and prevent exacerbation or deterioration   Update acute care plan with appropriate goals if chronic or comorbid symptoms are exacerbated and prevent overall improvement and discharge     Problem: Skin/Tissue Integrity  Goal: Absence of new skin breakdown  Description: 1.  Monitor for areas of redness and/or skin breakdown  2.  Assess vascular access sites hourly  3.  Every 4-6 hours minimum:  Change oxygen saturation probe site  4.  Every 4-6 hours:  If on nasal continuous positive airway pressure, respiratory therapy assess nares and determine need for appliance change or resting period.  7/2/2024 0810 by Guanako Ernst RN  Outcome: Progressing  7/1/2024 2200 by Ivana Loyd RN  Outcome: Progressing     Problem: Nutrition Deficit:  Goal: Optimize nutritional status  7/2/2024 0810 by Guanako Ernst RN  Outcome: Progressing  7/1/2024 2200 by Ivana Loyd RN  Outcome: Progressing  Flowsheets (Taken 7/1/2024 2200)  Nutrient intake appropriate for improving, restoring, or maintaining nutritional needs: Monitor oral intake, labs, and treatment plans

## 2024-07-02 NOTE — PROGRESS NOTES
Progress Note    SUBJECTIVE:    Patient seen for f/u of Closed nondisplaced fracture of surgical neck of right humerus with routine healing.  She resting in bed no distress.  Continues to work with PT. No complaints    ROS:   Constitutional: negative  for fevers, and negative for chills.  Respiratory: negative for shortness of breath, negative for cough, and negative for wheezing  Cardiovascular: negative for chest pain, and negative for palpitations  Gastrointestinal: negative for abdominal pain, negative for nausea,negative for vomiting, negative for diarrhea, and negative for constipation     All other systems were reviewed with the patient and are negative unless otherwise stated in HPI      OBJECTIVE:      Vitals:   Vitals:    07/02/24 0717   BP: (!) 144/67   Pulse: 51   Resp: 16   Temp:    SpO2: 95%     Weight - Scale: 102.1 kg (225 lb)   Height: 165.1 cm (5' 5\")     Weight  Wt Readings from Last 3 Encounters:   07/02/24 102.1 kg (225 lb)   05/13/24 98.9 kg (218 lb)   04/11/24 98.6 kg (217 lb 4.8 oz)     Body mass index is 37.44 kg/m².    24HR INTAKE/OUTPUT:      Intake/Output Summary (Last 24 hours) at 7/2/2024 0744  Last data filed at 7/2/2024 0734  Gross per 24 hour   Intake 1260 ml   Output 600 ml   Net 660 ml     -----------------------------------------------------------------  Exam:    GEN:    Awake, alert and oriented x3.   EYES:  EOMI, pupils equal   NECK: Supple. No lymphadenopathy.  No carotid bruit  CVS:    regular rate and rhythm, no audible murmur  PULM:  CTA, no wheezes, rales or rhonchi, no acute respiratory distress  ABD:    Bowels sounds normal.  Abdomen is soft.  No distention.  no tenderness to palpation.   EXT:   no edema bilaterally .  No calf tenderness. Right arm in a sling.  Pulse palpable.  Circ Checks WNL  NEURO: Moves all extremities.  Motor and sensory are grossly intact  SKIN:  No rashes.  No skin lesions.   hemiarthroplasty without hardware complication.   Mild osteoarthritic changes of the patellofemoral and lateral compartments.      Medial talar dome osteochondral lesion.      Additional findings, as above.         XR TIBIA FIBULA LEFT (2 VIEWS)   Final Result   No acute osseous abnormality identified.         CT SHOULDER RIGHT WO CONTRAST   Final Result   1. Acute nondisplaced nonangulated right proximal humeral metadiaphyseal   fracture with involvement of the surgical neck.   2. Normal glenohumeral alignment with severe glenohumeral arthropathy with   effusion and axillary pouch ossified intra-articular bodies.   3. Chronic rotator cuff tendinopathy with moderate fat atrophy of the   supraspinatus, infraspinatus, and subscapularis muscle bellies.   4. Moderate subacromial bursal fluid which may relate to bursitis versus   full-thickness rotator cuff tear.         XR SHOULDER RIGHT (MIN 2 VIEWS)   Final Result   1. Advanced osteoarthritis of the glenohumeral joint.   2. No evidence of any fracture or dislocation.         XR CHEST PORTABLE   Final Result   No acute process.         CT CSpine W/O Contrast   Final Result   No acute osseous abnormality of the cervical spine. Multilevel degenerative   changes.         CT Head W/O Contrast   Final Result   No acute intracranial abnormality.         Vascular duplex lower extremity venous left   Final Result            ASSESSMENT / PLAN:    MEDICAL DECISION MAKING:    Primary Problem(s): Closed nondisplaced fracture of surgical neck of right humerus with routine healing  Differential diagnoses: Viral infection, deconditioning  Condition is an undiagnosed new problem with uncertain prognosis  Condition is stable  Treatment plan:   Urine culture-no growth  Monitor labs and replace electrolytes  PT eval  Imaging: no further imaging studies ordered today  Medications:   Stop Levaquin  Received one-time dose of Rocephin in ED  Medication Monitoring / High Risk Medications:

## 2024-07-02 NOTE — PLAN OF CARE
Problem: Discharge Planning  Goal: Discharge to home or other facility with appropriate resources  7/1/2024 2200 by Ivana Loyd RN  Outcome: Progressing  Flowsheets (Taken 7/1/2024 2200)  Discharge to home or other facility with appropriate resources:   Identify barriers to discharge with patient and caregiver   Arrange for needed discharge resources and transportation as appropriate   Identify discharge learning needs (meds, wound care, etc)   Refer to discharge planning if patient needs post-hospital services based on physician order or complex needs related to functional status, cognitive ability or social support system  7/1/2024 1558 by Oma Barrera RN  Outcome: Progressing  Flowsheets (Taken 7/1/2024 1558)  Discharge to home or other facility with appropriate resources:   Identify barriers to discharge with patient and caregiver   Arrange for needed discharge resources and transportation as appropriate     Problem: Pain  Goal: Verbalizes/displays adequate comfort level or baseline comfort level  7/1/2024 2200 by Ivana Loyd RN  Outcome: Progressing  Flowsheets (Taken 7/1/2024 2200)  Verbalizes/displays adequate comfort level or baseline comfort level:   Encourage patient to monitor pain and request assistance   Assess pain using appropriate pain scale   Administer analgesics based on type and severity of pain and evaluate response   Implement non-pharmacological measures as appropriate and evaluate response   Notify Licensed Independent Practitioner if interventions unsuccessful or patient reports new pain  7/1/2024 1558 by Oma Barrera RN  Outcome: Progressing  Flowsheets (Taken 7/1/2024 1558)  Verbalizes/displays adequate comfort level or baseline comfort level:   Encourage patient to monitor pain and request assistance   Assess pain using appropriate pain scale   Administer analgesics based on type and severity of pain and evaluate response   Implement non-pharmacological measures as

## 2024-07-02 NOTE — PROGRESS NOTES
Physical Therapy  Facility/Department: [facility] MED SURG  Daily Treatment Note  NAME: Kaye Joyce  : 1945  MRN: [id number]    Date of Service: 2024    Discharge Recommendations:  Continue to assess pending progress, Subacute/Skilled Nursing Facility     Patient Diagnosis(es): The primary encounter diagnosis was Urinary tract infection without hematuria, site unspecified. Diagnoses of Hypomagnesemia, Fall, initial encounter, and Closed nondisplaced fracture of surgical neck of right humerus with routine healing, unspecified fracture morphology, subsequent encounter were also pertinent to this visit.    Assessment   Assessment: Therapist had asked OT to help in repositioning pt's. swathe sling as RN attempted to reposition for pt. comfort. OT able to reposition with therapist present. Pt. completed reclined and seated BLE therex x15 in all available planes of motion. SLR x10.  Activity Tolerance: Patient tolerated evaluation without incident     Plan    Physical Therapy Plan  General Plan: 2 times a day 7 days a week  Specific Instructions for Next Treatment: 1x/ daily on weekends  Current Treatment Recommendations: Strengthening;ROM;Balance training;Functional mobility training;Transfer training;Gait training;Stair training;Manual;Home exercise program;Pain management;Safety education & training;Patient/Caregiver education & training;Equipment evaluation, education, & procurement;Positioning;Therapeutic activities     Restrictions  Restrictions/Precautions  Restrictions/Precautions: Fall Risk, Weight Bearing, General Precautions, Contact Precautions  Required Braces or Orthoses?: Yes  Upper Extremity Weight Bearing Restrictions  Right Upper Extremity Weight Bearing: Non Weight Bearing  Required Braces or Orthoses  Right Upper Extremity Brace/Splint: Right upper extremity in sling- nursing asked about more of an immobilizer brace, therapist informed nursing to check with central supply     Subjective

## 2024-07-02 NOTE — PROGRESS NOTES
Vitals and assessment complete, see flowsheet. Patient is resting in bed. Patient complains of 9/10 left lower leg pain, see MAR. Patient also states the straps to her right shoulder sling are digging into her and is uncomfortable. Writer adjusted straps, patient is satisfied at this time. Patient denies numbness or tingling. Water refilled. Call light is within reach. Care ongoing.

## 2024-07-02 NOTE — PROGRESS NOTES
Assessment completed. Pt with sling and swath in place, was readjusted. Pt complains of pain in right shoulder but complains of worse pain in left foot. Bruising noted to right lateral upper arm area. Patient bradycardic when sleeping, has TALAT. Assisted pt to bathroom. Requires extensive assistance including help pulling underwear up and help with stabilization during ambulation. Uses a cane. Writer discussed getting a larger sling and swath for patient to properly immobilize the shoulder with PTA who stated she would discuss with OT to evaluate the fit. Pt states all needs are met at this time. Pt sitting in chair with alarm on. Call light in reach with bedside table in reach. Mail envelopes opened per pt request so she can read her mail.

## 2024-07-03 LAB
ALBUMIN SERPL-MCNC: 3.5 G/DL (ref 3.5–5.2)
ALBUMIN/GLOB SERPL: 1.4 {RATIO} (ref 1–2.5)
ALP SERPL-CCNC: 55 U/L (ref 35–104)
ALT SERPL-CCNC: 13 U/L (ref 5–33)
ANION GAP SERPL CALCULATED.3IONS-SCNC: 7 MMOL/L (ref 9–17)
AST SERPL-CCNC: 13 U/L
BASOPHILS # BLD: 0.03 K/UL (ref 0–0.2)
BASOPHILS NFR BLD: 0 % (ref 0–2)
BILIRUB SERPL-MCNC: 0.3 MG/DL (ref 0.3–1.2)
BUN SERPL-MCNC: 25 MG/DL (ref 8–23)
BUN/CREAT SERPL: 25 (ref 9–20)
CALCIUM SERPL-MCNC: 10.2 MG/DL (ref 8.6–10.4)
CHLORIDE SERPL-SCNC: 98 MMOL/L (ref 98–107)
CO2 SERPL-SCNC: 33 MMOL/L (ref 20–31)
CREAT SERPL-MCNC: 1 MG/DL (ref 0.5–0.9)
EOSINOPHIL # BLD: 0.09 K/UL (ref 0–0.44)
EOSINOPHILS RELATIVE PERCENT: 1 % (ref 1–4)
ERYTHROCYTE [DISTWIDTH] IN BLOOD BY AUTOMATED COUNT: 14.1 % (ref 11.8–14.4)
GFR, ESTIMATED: 57 ML/MIN/1.73M2
GLUCOSE SERPL-MCNC: 107 MG/DL (ref 70–99)
HCT VFR BLD AUTO: 40.1 % (ref 36.3–47.1)
HGB BLD-MCNC: 13.2 G/DL (ref 11.9–15.1)
IMM GRANULOCYTES # BLD AUTO: <0.03 K/UL (ref 0–0.3)
IMM GRANULOCYTES NFR BLD: 0 %
LYMPHOCYTES NFR BLD: 0.92 K/UL (ref 1.1–3.7)
LYMPHOCYTES RELATIVE PERCENT: 13 % (ref 24–43)
MCH RBC QN AUTO: 31 PG (ref 25.2–33.5)
MCHC RBC AUTO-ENTMCNC: 32.9 G/DL (ref 28.4–34.8)
MCV RBC AUTO: 94.1 FL (ref 82.6–102.9)
MONOCYTES NFR BLD: 0.65 K/UL (ref 0.1–1.2)
MONOCYTES NFR BLD: 9 % (ref 3–12)
NEUTROPHILS NFR BLD: 77 % (ref 36–65)
NEUTS SEG NFR BLD: 5.61 K/UL (ref 1.5–8.1)
NRBC BLD-RTO: 0 PER 100 WBC
PLATELET # BLD AUTO: 168 K/UL (ref 138–453)
PMV BLD AUTO: 9.4 FL (ref 8.1–13.5)
POTASSIUM SERPL-SCNC: 3.6 MMOL/L (ref 3.7–5.3)
PROT SERPL-MCNC: 6 G/DL (ref 6.4–8.3)
RBC # BLD AUTO: 4.26 M/UL (ref 3.95–5.11)
SODIUM SERPL-SCNC: 138 MMOL/L (ref 135–144)
WBC OTHER # BLD: 7.3 K/UL (ref 3.5–11.3)

## 2024-07-03 PROCEDURE — 80053 COMPREHEN METABOLIC PANEL: CPT

## 2024-07-03 PROCEDURE — 1200000000 HC SEMI PRIVATE

## 2024-07-03 PROCEDURE — 6370000000 HC RX 637 (ALT 250 FOR IP): Performed by: NURSE PRACTITIONER

## 2024-07-03 PROCEDURE — 97110 THERAPEUTIC EXERCISES: CPT

## 2024-07-03 PROCEDURE — 85025 COMPLETE CBC W/AUTO DIFF WBC: CPT

## 2024-07-03 PROCEDURE — 6370000000 HC RX 637 (ALT 250 FOR IP): Performed by: STUDENT IN AN ORGANIZED HEALTH CARE EDUCATION/TRAINING PROGRAM

## 2024-07-03 PROCEDURE — 2580000003 HC RX 258: Performed by: INTERNAL MEDICINE

## 2024-07-03 PROCEDURE — 6370000000 HC RX 637 (ALT 250 FOR IP): Performed by: INTERNAL MEDICINE

## 2024-07-03 PROCEDURE — 97116 GAIT TRAINING THERAPY: CPT

## 2024-07-03 PROCEDURE — 6370000000 HC RX 637 (ALT 250 FOR IP)

## 2024-07-03 PROCEDURE — 36415 COLL VENOUS BLD VENIPUNCTURE: CPT

## 2024-07-03 PROCEDURE — 94761 N-INVAS EAR/PLS OXIMETRY MLT: CPT

## 2024-07-03 RX ADMIN — FERROUS SULFATE TAB 325 MG (65 MG ELEMENTAL FE) 325 MG: 325 (65 FE) TAB at 09:57

## 2024-07-03 RX ADMIN — OXYBUTYNIN CHLORIDE 15 MG: 5 TABLET, EXTENDED RELEASE ORAL at 09:55

## 2024-07-03 RX ADMIN — SERTRALINE HYDROCHLORIDE 50 MG: 50 TABLET ORAL at 09:55

## 2024-07-03 RX ADMIN — SODIUM CHLORIDE, PRESERVATIVE FREE 10 ML: 5 INJECTION INTRAVENOUS at 09:57

## 2024-07-03 RX ADMIN — PREDNISONE 5 MG: 5 TABLET ORAL at 09:56

## 2024-07-03 RX ADMIN — HYDROCODONE BITARTRATE AND ACETAMINOPHEN 2 TABLET: 5; 325 TABLET ORAL at 18:47

## 2024-07-03 RX ADMIN — PANTOPRAZOLE SODIUM 40 MG: 40 TABLET, DELAYED RELEASE ORAL at 09:55

## 2024-07-03 RX ADMIN — ATENOLOL 100 MG: 50 TABLET ORAL at 09:56

## 2024-07-03 RX ADMIN — PREDNISONE 5 MG: 5 TABLET ORAL at 20:29

## 2024-07-03 RX ADMIN — HYDROCODONE BITARTRATE AND ACETAMINOPHEN 2 TABLET: 5; 325 TABLET ORAL at 14:12

## 2024-07-03 RX ADMIN — HYDROCODONE BITARTRATE AND ACETAMINOPHEN 2 TABLET: 5; 325 TABLET ORAL at 05:47

## 2024-07-03 RX ADMIN — DOCUSATE SODIUM 100 MG: 100 CAPSULE, LIQUID FILLED ORAL at 09:55

## 2024-07-03 RX ADMIN — HYDROCODONE BITARTRATE AND ACETAMINOPHEN 2 TABLET: 5; 325 TABLET ORAL at 09:55

## 2024-07-03 RX ADMIN — HYDROCODONE BITARTRATE AND ACETAMINOPHEN 2 TABLET: 5; 325 TABLET ORAL at 23:13

## 2024-07-03 RX ADMIN — CHLORTHALIDONE 25 MG: 25 TABLET ORAL at 09:56

## 2024-07-03 RX ADMIN — APIXABAN 2.5 MG: 2.5 TABLET, FILM COATED ORAL at 20:29

## 2024-07-03 RX ADMIN — DOCUSATE SODIUM 100 MG: 100 CAPSULE, LIQUID FILLED ORAL at 20:29

## 2024-07-03 RX ADMIN — APIXABAN 2.5 MG: 2.5 TABLET, FILM COATED ORAL at 09:55

## 2024-07-03 RX ADMIN — OXYCODONE HYDROCHLORIDE AND ACETAMINOPHEN 500 MG: 500 TABLET ORAL at 09:57

## 2024-07-03 RX ADMIN — SODIUM CHLORIDE, PRESERVATIVE FREE 10 ML: 5 INJECTION INTRAVENOUS at 20:30

## 2024-07-03 ASSESSMENT — PAIN DESCRIPTION - LOCATION
LOCATION: LEG

## 2024-07-03 ASSESSMENT — PAIN - FUNCTIONAL ASSESSMENT
PAIN_FUNCTIONAL_ASSESSMENT: ACTIVITIES ARE NOT PREVENTED
PAIN_FUNCTIONAL_ASSESSMENT: PREVENTS OR INTERFERES SOME ACTIVE ACTIVITIES AND ADLS

## 2024-07-03 ASSESSMENT — PAIN DESCRIPTION - DESCRIPTORS
DESCRIPTORS: ACHING
DESCRIPTORS: THROBBING
DESCRIPTORS: ACHING;THROBBING
DESCRIPTORS: ACHING
DESCRIPTORS: ACHING;SHARP
DESCRIPTORS: ACHING;DISCOMFORT
DESCRIPTORS: ACHING;DISCOMFORT

## 2024-07-03 ASSESSMENT — PAIN DESCRIPTION - FREQUENCY
FREQUENCY: CONTINUOUS

## 2024-07-03 ASSESSMENT — PAIN DESCRIPTION - ONSET
ONSET: ON-GOING

## 2024-07-03 ASSESSMENT — PAIN SCALES - GENERAL
PAINLEVEL_OUTOF10: 9
PAINLEVEL_OUTOF10: 4
PAINLEVEL_OUTOF10: 2
PAINLEVEL_OUTOF10: 9
PAINLEVEL_OUTOF10: 5
PAINLEVEL_OUTOF10: 0
PAINLEVEL_OUTOF10: 6
PAINLEVEL_OUTOF10: 8

## 2024-07-03 ASSESSMENT — PAIN DESCRIPTION - ORIENTATION
ORIENTATION: LEFT

## 2024-07-03 ASSESSMENT — PAIN DESCRIPTION - PAIN TYPE
TYPE: ACUTE PAIN
TYPE: CHRONIC PAIN
TYPE: ACUTE PAIN
TYPE: ACUTE PAIN

## 2024-07-03 NOTE — PROGRESS NOTES
Physician Progress Note      PATIENT:               FRANCISCO RAE  [facility] #:                  [id number]  :                       1945  ADMIT DATE:       2024 3:17 PM  DISCH DATE:  RESPONDING  PROVIDER #:        RAMA Goins CNP          QUERY TEXT:    Patient admitted s/p fall at home. Noted documentation of UTI. In order to   support the diagnosis of UTI, please include additional clinical indicators in   your documentation.  Or please document if the diagnosis of UTI has been   ruled out after further study.    The medical record reflects the following:  Risk Factors: HTN, OK, TALAT  Clinical Indicators: D/C Antibiotics  Urine culture - 2024 NO SIGNIFICANT GROWTH  Treatment: Urine culture, Levaquin, Rocephin  Options provided:  -- UTI present as evidenced by, Please document evidence.  -- UTI was ruled out  -- Other - I will add my own diagnosis  -- Disagree - Not applicable / Not valid  -- Disagree - Clinically unable to determine / Unknown  -- Refer to Clinical Documentation Reviewer    PROVIDER RESPONSE TEXT:    UTI was ruled out after study.    Query created by: Jennifer Juarez on 7/3/2024 12:33 PM      Electronically signed by:  RAMA Goins CNP 7/3/2024 12:36 PM

## 2024-07-03 NOTE — PROGRESS NOTES
Updated the patient of the reason for the delay of her discharge.  Explain we are still waiting on the insurance company to give approval.    MEJIA Snyder

## 2024-07-03 NOTE — PLAN OF CARE
Problem: Discharge Planning  Goal: Discharge to home or other facility with appropriate resources  Outcome: Progressing  Flowsheets (Taken 7/3/2024 0106)  Discharge to home or other facility with appropriate resources: Identify barriers to discharge with patient and caregiver     Problem: Pain  Goal: Verbalizes/displays adequate comfort level or baseline comfort level  Outcome: Progressing  Flowsheets (Taken 7/3/2024 0106)  Verbalizes/displays adequate comfort level or baseline comfort level:   Encourage patient to monitor pain and request assistance   Assess pain using appropriate pain scale   Administer analgesics based on type and severity of pain and evaluate response   Implement non-pharmacological measures as appropriate and evaluate response     Problem: Safety - Adult  Goal: Free from fall injury  Outcome: Progressing  Flowsheets (Taken 7/3/2024 0106)  Free From Fall Injury: Instruct family/caregiver on patient safety     Problem: Chronic Conditions and Co-morbidities  Goal: Patient's chronic conditions and co-morbidity symptoms are monitored and maintained or improved  Outcome: Progressing  Flowsheets (Taken 7/3/2024 0106)  Care Plan - Patient's Chronic Conditions and Co-Morbidity Symptoms are Monitored and Maintained or Improved: Monitor and assess patient's chronic conditions and comorbid symptoms for stability, deterioration, or improvement     Problem: Skin/Tissue Integrity  Goal: Absence of new skin breakdown  Description: 1.  Monitor for areas of redness and/or skin breakdown  2.  Assess vascular access sites hourly  3.  Every 4-6 hours minimum:  Change oxygen saturation probe site  4.  Every 4-6 hours:  If on nasal continuous positive airway pressure, respiratory therapy assess nares and determine need for appliance change or resting period.  Outcome: Progressing

## 2024-07-03 NOTE — PROGRESS NOTES
Physical Therapy  Facility/Department: [facility] MED SURG  Daily Treatment Note  NAME: Kaye Joyce  : 1945  MRN: [id number]    Date of Service: 7/3/2024    Discharge Recommendations:  Continue to assess pending progress, Subacute/Skilled Nursing Facility     Patient Diagnosis(es): The primary encounter diagnosis was Urinary tract infection without hematuria, site unspecified. Diagnoses of Hypomagnesemia, Fall, initial encounter, and Closed nondisplaced fracture of surgical neck of right humerus with routine healing, unspecified fracture morphology, subsequent encounter were also pertinent to this visit.    Assessment   Assessment: Transfers:CGA. Pt. ambulated 25ftx2 with SC and CGA for safety. Pt. required 1 seated RB during ambulation d/t pain. Pt completed reclined and seated BLE therex x15 in all available planes of motion. SLR x10. Included ankle dorsifelxion/plantarflexion with OTB x10 with 5 sec hold.  Activity Tolerance: Patient tolerated evaluation without incident     Plan    Physical Therapy Plan  General Plan: 2 times a day 7 days a week  Specific Instructions for Next Treatment: 1x/ daily on weekends  Current Treatment Recommendations: Strengthening;ROM;Balance training;Functional mobility training;Transfer training;Gait training;Stair training;Manual;Home exercise program;Pain management;Safety education & training;Patient/Caregiver education & training;Equipment evaluation, education, & procurement;Positioning;Therapeutic activities     Restrictions  Restrictions/Precautions  Restrictions/Precautions: Fall Risk, Weight Bearing, General Precautions, Contact Precautions  Required Braces or Orthoses?: Yes  Upper Extremity Weight Bearing Restrictions  Right Upper Extremity Weight Bearing: Non Weight Bearing  Required Braces or Orthoses  Right Upper Extremity Brace/Splint: Right upper extremity in sling- nursing asked about more of an immobilizer brace, therapist informed nursing to check with  central supply     Subjective    Subjective  Subjective: Pt. in chair upon arrival, agreeable to therapy at this time  Pain: when ambulating pt. reports 9-10/10 pain  Orientation  Overall Orientation Status: Within Functional Limits     Objective   Bed Mobility Training  Bed Mobility Training: No  Transfer Training  Transfer Training: Yes  Overall Level of Assistance: Contact-guard assistance;Assist X1;Additional time  Interventions: Verbal cues  Sit to Stand: Contact-guard assistance;Assist X1;Additional time  Stand to Sit: Contact-guard assistance;Assist X1;Additional time  Gait  Gait Training: Yes  Overall Level of Assistance: Contact-guard assistance;Assist X1;Additional time  Distance (ft): 25 Feet (x2)  Assistive Device: Cane, straight  Interventions: Safety awareness training  Speed/Tatiana: Slow  Step Length: Left shortened  Gait Abnormalities: Antalgic  PT Exercises  Exercise Treatment: Reclined and seated BLE therex x15 in all available planes of motion. SLR x10. Ankle dorsifelxion/plantarflexion with OTB x5.  Safety Devices  Type of Devices: Call light within reach;Nurse notified;All fall risk precautions in place;Chair alarm in place;Left in chair       Goals  Short Term Goals  Time Frame for Short Term Goals: 10 visits  Short Term Goal 1: Patient will complete bed mobility and transfers with SBAx1 in order to ease functional mobility  Short Term Goal 2: Patient will ambulate 50 feetx1 with SC and CGAx1 in order to ease functional mobility  Short Term Goal 3: Patient will ascend/descend three steps with handrail and CGAX1 in order to safely enter/exit the home  Short Term Goal 4: Patient will tolerate 20-30' ther-ex in order to increase enduarance and ease ADLs    Education  Patient Education  Education Given To: Patient  Education Provided: Role of Therapy;Plan of Care;Equipment  Education Method: Verbal  Barriers to Learning: None  Education Outcome: Verbalized understanding    Therapy Time

## 2024-07-03 NOTE — PROGRESS NOTES
Writer to bedside to complete morning assessment. Upon entry to room, pt in bed eyes closed, respirations even and unlabored while on home CPAP. Vitals obtained and assessment completed, see flow sheet for details. Pt is A&Ox4. Pt denies any pain. Lung sounds are clear, diminished throughout. Right arm Is in sling and repositioned at this time. Pt updated on plan of care and whiteboard updated. Pt assisted x1 with cane to restroom and sat up in the chair for breakfast. Pt denies further needs from writer at this time. Call light in reach. Care ongoing.

## 2024-07-03 NOTE — PROGRESS NOTES
Physical Therapy  Facility/Department: [facility] MED SURG  Daily Treatment Note  NAME: Kaye Joyce  : 1945  MRN: [id number]    Date of Service: 7/3/2024    Discharge Recommendations:  Continue to assess pending progress, Subacute/Skilled Nursing Facility     Patient Diagnosis(es): The primary encounter diagnosis was Urinary tract infection without hematuria, site unspecified. Diagnoses of Hypomagnesemia, Fall, initial encounter, and Closed nondisplaced fracture of surgical neck of right humerus with routine healing, unspecified fracture morphology, subsequent encounter were also pertinent to this visit.    Assessment   Assessment: Transfers:CGA. Pt. ambulated 30ft in room with SC and CGA for safety. Pt. required 1 seated RB during ambulation d/t pain and fatigue. Pt completed reclined and seated BLE therex x15 in all available planes of motion. SLR x10. Included ankle dorsifelxion/plantarflexion, Inversion/Eversion with OTB x10 with 5 sec hold.  Activity Tolerance: Patient tolerated evaluation without incident     Plan    Physical Therapy Plan  General Plan: 2 times a day 7 days a week  Specific Instructions for Next Treatment: 1x/ daily on weekends  Current Treatment Recommendations: Strengthening;ROM;Balance training;Functional mobility training;Transfer training;Gait training;Stair training;Manual;Home exercise program;Pain management;Safety education & training;Patient/Caregiver education & training;Equipment evaluation, education, & procurement;Positioning;Therapeutic activities     Restrictions  Restrictions/Precautions  Restrictions/Precautions: Fall Risk, Weight Bearing, General Precautions, Contact Precautions  Required Braces or Orthoses?: Yes  Upper Extremity Weight Bearing Restrictions  Right Upper Extremity Weight Bearing: Non Weight Bearing  Required Braces or Orthoses  Right Upper Extremity Brace/Splint: Right upper extremity in sling- nursing asked about more of an immobilizer brace, therapist

## 2024-07-03 NOTE — PROGRESS NOTES
Comprehensive Nutrition Assessment    Type and Reason for Visit:  Reassess    Nutrition Recommendations/Plan:   Encourage oral fluids.  Encourage protein foods.     Malnutrition Assessment:  Malnutrition Status:  At risk for malnutrition (Comment) (06/28/24 1040)    Context:  Acute Illness     Findings of the 6 clinical characteristics of malnutrition:  Energy Intake:  Mild decrease in energy intake (Comment)  Weight Loss:  No significant weight loss     Body Fat Loss:  No significant body fat loss     Muscle Mass Loss:  No significant muscle mass loss    Fluid Accumulation:  No significant fluid accumulation     Strength:  Not Performed    Nutrition Assessment:    Continued inadequate nutrient intakes with taste acuity struggles post covid in June. States \"eating\" but last recorded PO 51-75%. Has requested lower amounts of water in bedside water pitcher and her bun/creat hav crept up a little.  Mildly elevated glucose on steroid and limited mobility with right arm in sling r/t humeral fx. Mg++ improved to 1.8. Encouraged oral fluids and protein foods upon visit.    Nutrition Related Findings:    trace BLE and RUE edema. + soft bm and active b/s. Wound Type: None       Current Nutrition Intake & Therapies:    Average Meal Intake: 51-75%  Average Supplements Intake: None Ordered  ADULT DIET; Regular    Anthropometric Measures:  Height: 165.1 cm (5' 5\")  Ideal Body Weight (IBW): 125 lbs (57 kg)    Admission Body Weight: 100.5 kg (221 lb 9 oz)  Current Body Weight: 101.5 kg (223 lb 12.3 oz), 177.2 % IBW. Weight Source: Bed Scale  Current BMI (kg/m2): 37.2  Usual Body Weight: 97.1 kg (214 lb) (low in February)  % Weight Change (Calculated): 4.6  Weight Adjustment For: No Adjustment                 BMI Categories: Obese Class 2 (BMI 35.0 -39.9)    Estimated Daily Nutrient Needs:  Energy Requirements Based On: Kcal/kg  Weight Used for Energy Requirements: Current  Energy (kcal/day): 1353-8154 (15-18/kg)  Weight Used

## 2024-07-03 NOTE — PROGRESS NOTES
Progress Note    SUBJECTIVE:    Patient seen for f/u of Closed nondisplaced fracture of surgical neck of right humerus with routine healing.  She resting in chair no distress.  Continues to work with PT. No complaints.  Feeling frustrated that she is not at SNF yet. BMs daily.     ROS:   Constitutional: negative  for fevers, and negative for chills.  Respiratory: negative for shortness of breath, negative for cough, and negative for wheezing  Cardiovascular: negative for chest pain, and negative for palpitations  Gastrointestinal: negative for abdominal pain, negative for nausea,negative for vomiting, negative for diarrhea, and negative for constipation     All other systems were reviewed with the patient and are negative unless otherwise stated in HPI      OBJECTIVE:      Vitals:   Vitals:    07/03/24 0719   BP: (!) 143/82   Pulse: (!) 46   Resp: 17   Temp: 97.1 °F (36.2 °C)   SpO2: 96%     Weight - Scale: 101.5 kg (223 lb 12.3 oz)   Height: 165.1 cm (5' 5\")     Weight  Wt Readings from Last 3 Encounters:   07/03/24 101.5 kg (223 lb 12.3 oz)   05/13/24 98.9 kg (218 lb)   04/11/24 98.6 kg (217 lb 4.8 oz)     Body mass index is 37.24 kg/m².    24HR INTAKE/OUTPUT:      Intake/Output Summary (Last 24 hours) at 7/3/2024 0807  Last data filed at 7/3/2024 0754  Gross per 24 hour   Intake 2130 ml   Output 1075 ml   Net 1055 ml     -----------------------------------------------------------------  Exam:    GEN:    Awake, alert and oriented x3.   EYES:  EOMI, pupils equal   NECK: Supple. No lymphadenopathy.  No carotid bruit  CVS:    regular rate and rhythm, no audible murmur  PULM:  CTA, no wheezes, rales or rhonchi, no acute respiratory distress  ABD:    Bowels sounds normal.  Abdomen is soft.  No distention.  no tenderness to palpation.   EXT:   no edema bilaterally .  No calf tenderness. Right arm in a sling.  Pulse palpable.  Circ Checks WNL  NEURO: Moves all extremities.  Motor and sensory are grossly intact  SKIN:  No  documented within the patient's medical record.   [DOES NOT SATISFY MIPS PERFORMANCE]    GRIS Valdovinos - CNP , GRIS, NP-C  Hospitalist Medicine        7/3/2024, 8:07 AM

## 2024-07-04 PROCEDURE — 6370000000 HC RX 637 (ALT 250 FOR IP): Performed by: STUDENT IN AN ORGANIZED HEALTH CARE EDUCATION/TRAINING PROGRAM

## 2024-07-04 PROCEDURE — 6370000000 HC RX 637 (ALT 250 FOR IP): Performed by: INTERNAL MEDICINE

## 2024-07-04 PROCEDURE — 94664 DEMO&/EVAL PT USE INHALER: CPT

## 2024-07-04 PROCEDURE — 1200000000 HC SEMI PRIVATE

## 2024-07-04 PROCEDURE — 2580000003 HC RX 258: Performed by: INTERNAL MEDICINE

## 2024-07-04 PROCEDURE — 94761 N-INVAS EAR/PLS OXIMETRY MLT: CPT

## 2024-07-04 PROCEDURE — 97110 THERAPEUTIC EXERCISES: CPT

## 2024-07-04 PROCEDURE — 6370000000 HC RX 637 (ALT 250 FOR IP)

## 2024-07-04 PROCEDURE — 97116 GAIT TRAINING THERAPY: CPT

## 2024-07-04 RX ADMIN — OXYCODONE HYDROCHLORIDE AND ACETAMINOPHEN 500 MG: 500 TABLET ORAL at 09:48

## 2024-07-04 RX ADMIN — TIZANIDINE 2 MG: 4 TABLET ORAL at 01:19

## 2024-07-04 RX ADMIN — PREDNISONE 5 MG: 5 TABLET ORAL at 20:00

## 2024-07-04 RX ADMIN — HYDROCODONE BITARTRATE AND ACETAMINOPHEN 2 TABLET: 5; 325 TABLET ORAL at 13:10

## 2024-07-04 RX ADMIN — HYDROCODONE BITARTRATE AND ACETAMINOPHEN 2 TABLET: 5; 325 TABLET ORAL at 07:13

## 2024-07-04 RX ADMIN — ATENOLOL 100 MG: 50 TABLET ORAL at 09:49

## 2024-07-04 RX ADMIN — TIZANIDINE 2 MG: 4 TABLET ORAL at 20:00

## 2024-07-04 RX ADMIN — ANTACID TABLETS 500 MG: 500 TABLET, CHEWABLE ORAL at 13:48

## 2024-07-04 RX ADMIN — SODIUM CHLORIDE, PRESERVATIVE FREE 10 ML: 5 INJECTION INTRAVENOUS at 09:48

## 2024-07-04 RX ADMIN — HYDROCODONE BITARTRATE AND ACETAMINOPHEN 2 TABLET: 5; 325 TABLET ORAL at 17:43

## 2024-07-04 RX ADMIN — SERTRALINE HYDROCHLORIDE 50 MG: 50 TABLET ORAL at 09:48

## 2024-07-04 RX ADMIN — APIXABAN 2.5 MG: 2.5 TABLET, FILM COATED ORAL at 09:49

## 2024-07-04 RX ADMIN — PREDNISONE 5 MG: 5 TABLET ORAL at 09:48

## 2024-07-04 RX ADMIN — CHLORTHALIDONE 25 MG: 25 TABLET ORAL at 09:48

## 2024-07-04 RX ADMIN — APIXABAN 2.5 MG: 2.5 TABLET, FILM COATED ORAL at 20:00

## 2024-07-04 RX ADMIN — OXYBUTYNIN CHLORIDE 15 MG: 5 TABLET, EXTENDED RELEASE ORAL at 09:49

## 2024-07-04 RX ADMIN — DOCUSATE SODIUM 100 MG: 100 CAPSULE, LIQUID FILLED ORAL at 20:00

## 2024-07-04 RX ADMIN — HYDROCODONE BITARTRATE AND ACETAMINOPHEN 2 TABLET: 5; 325 TABLET ORAL at 23:21

## 2024-07-04 RX ADMIN — PANTOPRAZOLE SODIUM 40 MG: 40 TABLET, DELAYED RELEASE ORAL at 07:13

## 2024-07-04 RX ADMIN — DOCUSATE SODIUM 100 MG: 100 CAPSULE, LIQUID FILLED ORAL at 09:48

## 2024-07-04 ASSESSMENT — PAIN SCALES - GENERAL
PAINLEVEL_OUTOF10: 7
PAINLEVEL_OUTOF10: 3
PAINLEVEL_OUTOF10: 8
PAINLEVEL_OUTOF10: 9
PAINLEVEL_OUTOF10: 4
PAINLEVEL_OUTOF10: 0
PAINLEVEL_OUTOF10: 5
PAINLEVEL_OUTOF10: 4
PAINLEVEL_OUTOF10: 3
PAINLEVEL_OUTOF10: 8

## 2024-07-04 ASSESSMENT — PAIN DESCRIPTION - FREQUENCY
FREQUENCY: CONTINUOUS
FREQUENCY: INTERMITTENT
FREQUENCY: INTERMITTENT

## 2024-07-04 ASSESSMENT — PAIN DESCRIPTION - PAIN TYPE
TYPE: CHRONIC PAIN
TYPE: CHRONIC PAIN
TYPE: ACUTE PAIN

## 2024-07-04 ASSESSMENT — PAIN DESCRIPTION - ORIENTATION
ORIENTATION: LEFT
ORIENTATION: LEFT;LOWER
ORIENTATION: LEFT;RIGHT
ORIENTATION: LEFT

## 2024-07-04 ASSESSMENT — PAIN DESCRIPTION - DESCRIPTORS
DESCRIPTORS: ACHING
DESCRIPTORS: ACHING;DISCOMFORT;SHARP
DESCRIPTORS: THROBBING
DESCRIPTORS: ACHING

## 2024-07-04 ASSESSMENT — PAIN DESCRIPTION - ONSET
ONSET: ON-GOING
ONSET: ON-GOING
ONSET: SUDDEN

## 2024-07-04 ASSESSMENT — PAIN DESCRIPTION - LOCATION
LOCATION: SHOULDER;LEG
LOCATION: LEG

## 2024-07-04 ASSESSMENT — PAIN - FUNCTIONAL ASSESSMENT
PAIN_FUNCTIONAL_ASSESSMENT: PREVENTS OR INTERFERES SOME ACTIVE ACTIVITIES AND ADLS
PAIN_FUNCTIONAL_ASSESSMENT: ACTIVITIES ARE NOT PREVENTED
PAIN_FUNCTIONAL_ASSESSMENT: PREVENTS OR INTERFERES SOME ACTIVE ACTIVITIES AND ADLS
PAIN_FUNCTIONAL_ASSESSMENT: ACTIVITIES ARE NOT PREVENTED
PAIN_FUNCTIONAL_ASSESSMENT: PREVENTS OR INTERFERES SOME ACTIVE ACTIVITIES AND ADLS

## 2024-07-04 NOTE — PROGRESS NOTES
RN at bedside, vital signs and assessment completed, patient c/o pain in LLE, none in RUE, see MAR, otherwise patient denies any needs, is working with therapy now. Fall precautions in place, care ongoing.

## 2024-07-04 NOTE — PROGRESS NOTES
Progress Note    SUBJECTIVE:    Patient seen for f/u of Closed nondisplaced fracture of surgical neck of right humerus with routine healing.  She resting in bed no distress.  Continues to work with PT. No complaints.  Daily BMs.       ROS:   Constitutional: negative  for fevers, and negative for chills.  Respiratory: negative for shortness of breath, negative for cough, and negative for wheezing  Cardiovascular: negative for chest pain, and negative for palpitations  Gastrointestinal: negative for abdominal pain, negative for nausea,negative for vomiting, negative for diarrhea, and negative for constipation     All other systems were reviewed with the patient and are negative unless otherwise stated in HPI      OBJECTIVE:      Vitals:   Vitals:    07/03/24 1917   BP:    Pulse:    Resp: 16   Temp:    SpO2:      Weight - Scale: 101.5 kg (223 lb 12.3 oz)   Height: 165.1 cm (5' 5\")     Weight  Wt Readings from Last 3 Encounters:   07/03/24 101.5 kg (223 lb 12.3 oz)   05/13/24 98.9 kg (218 lb)   04/11/24 98.6 kg (217 lb 4.8 oz)     Body mass index is 37.24 kg/m².    24HR INTAKE/OUTPUT:      Intake/Output Summary (Last 24 hours) at 7/4/2024 0654  Last data filed at 7/3/2024 1945  Gross per 24 hour   Intake 600 ml   Output 850 ml   Net -250 ml     -----------------------------------------------------------------  Exam:    GEN:    Awake, alert and oriented x3.   EYES:  EOMI, pupils equal   NECK: Supple. No lymphadenopathy.  No carotid bruit  CVS:    regular rate and rhythm, no audible murmur  PULM:  CTA, no wheezes, rales or rhonchi, no acute respiratory distress  ABD:    Bowels sounds normal.  Abdomen is soft.  No distention.  no tenderness to palpation.   EXT:   no edema bilaterally .  No calf tenderness. Right arm in a sling.  Pulse palpable.  Circ Checks WNL  NEURO: Moves all extremities.  Motor and sensory are grossly intact  SKIN:  No rashes.  No skin lesions.  Ecchymosis to right  swath  Ice  Appreciate   Imaging: No further imaging  Medications:   Norco as needed for pain  Tizanidine as needed for muscle spasm     Left lower leg pain  Condition is stable  Treatment plan:   Consult Dr Jorje Arana  PT eval  Imaging: No further imaging  Venous Doppler-negative for DVT  Medications:   Norco as needed for pain  Tizanidine as needed for muscle spasm    Secondary hypercoagulable state/history DVT  Condition is a chronic stable condition  Treatment plan: Continue current treatment  Imaging: no further imaging studies ordered today  Medications:   Continue Eliquis     Nutrition status:   at risk for malnutrition  Dietician consult initiated     Hospital Prophylaxis:   DVT: Eliquis   Stress Ulcer: PPI      Disposition:  Shared decision making: All test results, treatment options and disposition options were discussed with the patient today  Social determinants of health that may impact management: Pt lives alone and is unable to care for self  Code status: Full Code   Disposition: Discharge plan is [place] when approved by insurance  Unavoidable [facility] Advanced Care Planning documentation:  [x] I have confirmed that the patient's Advance Care Plan is present, Code Status is documented, or surrogate decision maker is listed in the patient's medical record  [If \"yes\", STOP HERE]     [] The patient's Advance Care Plan is NOT present because:    []  I confirmed today that the patient does not wish or was not able to name a   surrogate decision maker or provide and advance care plan.    [] Hospice care is currently being provided or has been provided within the   calendar year.    []  I did NOT confirm today the presence of an Advance Care Plan or surrogate   decision maker documented within the patient's medical record.   [DOES NOT SATISFY [facility] PERFORMANCE]    Nicki Batista, GRIS - CNP , GRIS, NP-C  Hospitalist Medicine        7/4/2024, 6:54 AM

## 2024-07-04 NOTE — PLAN OF CARE
Problem: Discharge Planning  Goal: Discharge to home or other facility with appropriate resources  Outcome: Progressing     Problem: Pain  Goal: Verbalizes/displays adequate comfort level or baseline comfort level  7/4/2024 1938 by Peyton Patel, RN  Outcome: Progressing     Problem: Safety - Adult  Goal: Free from fall injury  Outcome: Progressing     Problem: Chronic Conditions and Co-morbidities  Goal: Patient's chronic conditions and co-morbidity symptoms are monitored and maintained or improved  Outcome: Progressing     Problem: Skin/Tissue Integrity  Goal: Absence of new skin breakdown  Description: 1.  Monitor for areas of redness and/or skin breakdown  2.  Assess vascular access sites hourly  3.  Every 4-6 hours minimum:  Change oxygen saturation probe site  4.  Every 4-6 hours:  If on nasal continuous positive airway pressure, respiratory therapy assess nares and determine need for appliance change or resting period.  7/4/2024 1938 by Peyton Patel, RN  Outcome: Progressing     Problem: Nutrition Deficit:  Goal: Optimize nutritional status  7/4/2024 1938 by Peyton Patel, RN  Outcome: Progressing

## 2024-07-04 NOTE — PROGRESS NOTES
RESPIRATORY ASSESSMENT PROTOCOL                                                                                              Patient Name: Kaye Joyce Room#: 0312/0312-01 : 1945     Admitting diagnosis: Hypomagnesemia [E83.42]  UTI (urinary tract infection) [N39.0]  Fall, initial encounter [W19.XXXA]  Urinary tract infection without hematuria, site unspecified [N39.0]       Medical History:   Past Medical History:   Diagnosis Date    Arthritis     Blood clot in vein 2022    on Xarelto for two months per patient    Brain bleed (HCC)     Cataract     Closed nondisplaced fracture of surgical neck of right humerus with routine healing 2022    DVT (deep venous thrombosis) [facility])     History of hip replacement     History of total bilateral knee replacement     Hypertension     [facility]    Sleep apnea        PATIENT ASSESSMENT    LABORATORY DATA  Hematology:   Lab Results   Component Value Date/Time    WBC 7.3 2024 05:35 AM    RBC 4.26 2024 05:35 AM    RBC 4.67 2024 10:30 AM    HGB 13.2 2024 05:35 AM    HCT 40.1 2024 05:35 AM     2024 05:35 AM     Chemistry:  No results found for: \"PHART\", \"LTM4BAF\", \"PO2ART\", \"D3PGSYOO\", \"NJE6XUW\", \"PBEA\", \"NBEA\"    VITALS  Pulse: 57   Respirations: 16  BP: 134/65  SpO2: 92 % O2 Device: None (Room air)  Temp: 97 °F (36.1 °C)    SKIN COLOR  [x] Normal  [] Pale  [] Dusky  [] Cyanotic    RESPIRATORY PATTERN  [x] Normal  [] Dyspnea  [] Cheyne-Melendez  [] Kussmaul  [] Biots    AMBULATORY  [] Yes  [] No  [x] With Assistance    PEAK FLOW  Predicted:     Personal Best:            Patient Acuity 0 1 2 3 4 Score   Level of Consciousness (LOC) [x]  Alert & Oriented or Pt normal LOC []  Confused;follows directions []  Confused & uncooper-ative []  Obtunded []  Comatose 0   Respiratory Rate  (RR) [x]  Reg. rate & pattern. 12 - 20 bpm  []  Increased RR. Greater than 20  bpm   []  SOB w/ exertion or RR greater than 24 bpm []  Access- ory muscle use at rest. Abn.  resp. []  SOB at rest.   0   Bilateral Breath Sounds (BBS) [x]  Clear []  Diminish-ed bases  []  Diminish-ed t/o, or rales   []  Sporadic, scattered wheezes or rhonchi []  Persistentwheezes and, or absent BBS 0   Cough [x]  Strong, effective, & non-prod. []  Effective & prod. Less than 25 ml (2 TBSP) over past 24 hrs []  Ineffective & non-prod to less than 25 ML over past 24 hrs []  Ineffective and, or greater than 25 ml sputum prod. past 24 hrs. []  Nonspon- taneous; Requires suctioning 0   Pulmonary History  (PULM HX) [x]  No smoking and no chronic pulmonary history []  Former smoker. Quit over 12 mos. ago []  Current smoker or quit w/ in 12 mos []  Pulm. History and, or 20 pk/yr smoking hx []  Admitted w/ acute pulm. dx and, or has been admitted w/ pulm. dx 2 or more times over past 12 mos 0   Surgical History this Admit  (SURG HX) [x]  No surgery []  General surgery []  Lower abdominal []  Thoracic or upper abdominal   []  Thoracic w/ pulm. disease 0   Chest X-Ray (CXR)/CT Scan [x]  Clear or not applicable []  Not available []  Atelectasis or pleural effusions []  Localized infiltrate or pulm. edema []  Con-solidated Infiltrates, bilateral, or in more than 1 lobe 0   TOTAL ACUITY: 0       CARE PLAN    If Acuity Level is 2, 3, or 4 in any of the following:    [] BILATERAL BREATH SOUNDS (BBS)     [] PULMONARY HISTORY (PULM HX)  [] Respiratory Rate  (RR)    Goal: Improve respiratory functions in patients with airway disease and decrease WOB    [] AEROSOL PROTOCOL    Total Acuity:   14-28  []  Secondary Assessment in 24 hrs Total Acuity:  9-13  []  Secondary Assessment in 24 hrs Total Acuity:  4-8  []  Secondary Assessment in 24 hrs Total Acuity:  0-3  []  Secondary Assessment in 48 hrs   HHN AEROSOL THERAPY with  [physician-ordered bronchodilator(s)] q 4 & Albuterol PRN q2 hrs.   Breath-Actuated Neb if BBS Acuity = 4, and

## 2024-07-04 NOTE — PROGRESS NOTES
Physical Therapy  Facility/Department: [facility] MED SURG  Daily Treatment Note  NAME: Kaye Joyce  : 1945  MRN: [id number]    Date of Service: 2024    Discharge Recommendations:  Continue to assess pending progress, Subacute/Skilled Nursing Facility        Patient Diagnosis(es): The primary encounter diagnosis was Urinary tract infection without hematuria, site unspecified. Diagnoses of Hypomagnesemia, Fall, initial encounter, and Closed nondisplaced fracture of surgical neck of right humerus with routine healing, unspecified fracture morphology, subsequent encounter were also pertinent to this visit.    Assessment   Assessment: Patient in bed upon arrival with RN in room, agreeable to therapy at this time. Pt reports 9/10 L LE pain when transitioning to EOB and states \"it feels like getting hit with a baseball bat\". Bed Mobility: CGA/Daniel Transfers: CGA Pt ambulated 30ft with SC and CGA for safety, 25% cues for directional changes and remaining with path of ambulation. Pt completed reclined and seated BLE therex x15 in all available planes of motion, SLR x10. Rest breaks throughout session due to fatiuge.  Activity Tolerance: Patient tolerated treatment well     Plan    Physical Therapy Plan  General Plan: 2 times a day 7 days a week (1x/day on weekends and holidays)     Restrictions  Restrictions/Precautions  Restrictions/Precautions: Fall Risk, Weight Bearing, General Precautions, Contact Precautions  Required Braces or Orthoses?: Yes  Upper Extremity Weight Bearing Restrictions  Right Upper Extremity Weight Bearing: Non Weight Bearing  Required Braces or Orthoses  Right Upper Extremity Brace/Splint: Right upper extremity in sling- nursing asked about more of an immobilizer brace, therapist informed nursing to check with central supply     Subjective    Subjective  Subjective: Pt in bed upon arrival with RN, agreeable to therapy at this time.  Pain: 9/10 L LE, states \"it feels like getting hit in the  ADLs    Education  Patient Education  Education Given To: Patient  Education Provided: Role of Therapy;Plan of Care;Equipment  Education Method: Verbal  Barriers to Learning: None  Education Outcome: Verbalized understanding    AM-PAC - Mobility              Therapy Time   Individual Concurrent Group Co-treatment   Time In 0713         Time Out 0745         Minutes 32                 Silvia Viveros, PTA

## 2024-07-04 NOTE — PLAN OF CARE
Problem: Discharge Planning  Goal: Discharge to home or other facility with appropriate resources  Outcome: Progressing     Problem: Pain  Goal: Verbalizes/displays adequate comfort level or baseline comfort level  Outcome: Progressing     Problem: Safety - Adult  Goal: Free from fall injury  Outcome: Progressing     Problem: Chronic Conditions and Co-morbidities  Goal: Patient's chronic conditions and co-morbidity symptoms are monitored and maintained or improved  Outcome: Progressing     Problem: Skin/Tissue Integrity  Goal: Absence of new skin breakdown  Description: 1.  Monitor for areas of redness and/or skin breakdown  2.  Assess vascular access sites hourly  3.  Every 4-6 hours minimum:  Change oxygen saturation probe site  4.  Every 4-6 hours:  If on nasal continuous positive airway pressure, respiratory therapy assess nares and determine need for appliance change or resting period.  Outcome: Progressing     Problem: Nutrition Deficit:  Goal: Optimize nutritional status  Outcome: Progressing

## 2024-07-04 NOTE — PROGRESS NOTES
Writer at bedside to complete evening assessment. Upon entry to room, pt awake and in chair, respirations normal and unlabored while on room air. Vitals obtained and assessment completed, see flow sheet for details. Pt denies needs from writer at this time. Call light in reach. Care is ongoing.

## 2024-07-04 NOTE — PROGRESS NOTES
Patient had new IV started on night shift due to infiltration of the last one, upon flush of IV by writer today that one was infiltrated and leaking as well. Text sent to Dr Hughes via supervisor phone asking if new IV should be inserted. Per Dr Hughes, ok to leave IV out.

## 2024-07-04 NOTE — PROGRESS NOTES
Vitals and assessment completed at this time, see flowsheet for more details. Pt resting in bed at this time. Pt denies rates pain 9/10 in L leg. See MAR. Pt denies pain in R shoulder. All needs met at this time, call light within reach. Care ongoing.

## 2024-07-04 NOTE — PLAN OF CARE
Problem: Pain  Goal: Verbalizes/displays adequate comfort level or baseline comfort level  7/4/2024 0748 by Jane Mendoza RN  Outcome: Progressing  Flowsheets (Taken 7/4/2024 0748)  Verbalizes/displays adequate comfort level or baseline comfort level:   Encourage patient to monitor pain and request assistance   Administer analgesics based on type and severity of pain and evaluate response   Consider cultural and social influences on pain and pain management   Assess pain using appropriate pain scale   Implement non-pharmacological measures as appropriate and evaluate response   Notify Licensed Independent Practitioner if interventions unsuccessful or patient reports new pain     Problem: Skin/Tissue Integrity  Goal: Absence of new skin breakdown  Description: 1.  Monitor for areas of redness and/or skin breakdown  2.  Assess vascular access sites hourly  3.  Every 4-6 hours minimum:  Change oxygen saturation probe site  4.  Every 4-6 hours:  If on nasal continuous positive airway pressure, respiratory therapy assess nares and determine need for appliance change or resting period.  7/4/2024 0748 by Jane Mendoza RN  Outcome: Progressing     Problem: Nutrition Deficit:  Goal: Optimize nutritional status  7/4/2024 0748 by Jane Mendoza RN  Outcome: Progressing  Flowsheets (Taken 7/4/2024 0748)  Nutrient intake appropriate for improving, restoring, or maintaining nutritional needs:   Assess nutritional status and recommend course of action   Monitor oral intake, labs, and treatment plans   Recommend appropriate diets, oral nutritional supplements, and vitamin/mineral supplements

## 2024-07-05 ENCOUNTER — APPOINTMENT (OUTPATIENT)
Dept: CT IMAGING | Age: 79
DRG: 563 | End: 2024-07-05
Payer: MEDICARE

## 2024-07-05 PROCEDURE — 6370000000 HC RX 637 (ALT 250 FOR IP): Performed by: NURSE PRACTITIONER

## 2024-07-05 PROCEDURE — 6370000000 HC RX 637 (ALT 250 FOR IP)

## 2024-07-05 PROCEDURE — 97116 GAIT TRAINING THERAPY: CPT

## 2024-07-05 PROCEDURE — 94761 N-INVAS EAR/PLS OXIMETRY MLT: CPT

## 2024-07-05 PROCEDURE — 1200000000 HC SEMI PRIVATE

## 2024-07-05 PROCEDURE — 97110 THERAPEUTIC EXERCISES: CPT

## 2024-07-05 PROCEDURE — 73700 CT LOWER EXTREMITY W/O DYE: CPT

## 2024-07-05 PROCEDURE — 6370000000 HC RX 637 (ALT 250 FOR IP): Performed by: STUDENT IN AN ORGANIZED HEALTH CARE EDUCATION/TRAINING PROGRAM

## 2024-07-05 PROCEDURE — 6370000000 HC RX 637 (ALT 250 FOR IP): Performed by: INTERNAL MEDICINE

## 2024-07-05 RX ORDER — LIDOCAINE 4 G/G
1 PATCH TOPICAL DAILY
Status: DISCONTINUED | OUTPATIENT
Start: 2024-07-05 | End: 2024-07-08 | Stop reason: HOSPADM

## 2024-07-05 RX ORDER — POTASSIUM CHLORIDE 20 MEQ/1
40 TABLET, EXTENDED RELEASE ORAL ONCE
Status: COMPLETED | OUTPATIENT
Start: 2024-07-05 | End: 2024-07-05

## 2024-07-05 RX ADMIN — HYDROCODONE BITARTRATE AND ACETAMINOPHEN 2 TABLET: 5; 325 TABLET ORAL at 07:41

## 2024-07-05 RX ADMIN — HYDROCODONE BITARTRATE AND ACETAMINOPHEN 2 TABLET: 5; 325 TABLET ORAL at 03:39

## 2024-07-05 RX ADMIN — DOCUSATE SODIUM 100 MG: 100 CAPSULE, LIQUID FILLED ORAL at 07:42

## 2024-07-05 RX ADMIN — HYDROCODONE BITARTRATE AND ACETAMINOPHEN 2 TABLET: 5; 325 TABLET ORAL at 19:24

## 2024-07-05 RX ADMIN — CHLORTHALIDONE 25 MG: 25 TABLET ORAL at 07:37

## 2024-07-05 RX ADMIN — HYDROCODONE BITARTRATE AND ACETAMINOPHEN 2 TABLET: 5; 325 TABLET ORAL at 23:20

## 2024-07-05 RX ADMIN — DOCUSATE SODIUM 100 MG: 100 CAPSULE, LIQUID FILLED ORAL at 19:25

## 2024-07-05 RX ADMIN — ATENOLOL 100 MG: 50 TABLET ORAL at 07:37

## 2024-07-05 RX ADMIN — HYDROCODONE BITARTRATE AND ACETAMINOPHEN 2 TABLET: 5; 325 TABLET ORAL at 15:20

## 2024-07-05 RX ADMIN — FERROUS SULFATE TAB 325 MG (65 MG ELEMENTAL FE) 325 MG: 325 (65 FE) TAB at 07:37

## 2024-07-05 RX ADMIN — OXYCODONE HYDROCHLORIDE AND ACETAMINOPHEN 500 MG: 500 TABLET ORAL at 07:36

## 2024-07-05 RX ADMIN — TIZANIDINE 2 MG: 4 TABLET ORAL at 07:37

## 2024-07-05 RX ADMIN — SERTRALINE HYDROCHLORIDE 50 MG: 50 TABLET ORAL at 07:45

## 2024-07-05 RX ADMIN — PREDNISONE 5 MG: 5 TABLET ORAL at 19:25

## 2024-07-05 RX ADMIN — PANTOPRAZOLE SODIUM 40 MG: 40 TABLET, DELAYED RELEASE ORAL at 07:37

## 2024-07-05 RX ADMIN — POTASSIUM CHLORIDE 40 MEQ: 1500 TABLET, EXTENDED RELEASE ORAL at 07:46

## 2024-07-05 RX ADMIN — APIXABAN 2.5 MG: 2.5 TABLET, FILM COATED ORAL at 07:37

## 2024-07-05 RX ADMIN — PREDNISONE 5 MG: 5 TABLET ORAL at 07:37

## 2024-07-05 RX ADMIN — APIXABAN 2.5 MG: 2.5 TABLET, FILM COATED ORAL at 21:50

## 2024-07-05 RX ADMIN — OXYBUTYNIN CHLORIDE 15 MG: 5 TABLET, EXTENDED RELEASE ORAL at 07:36

## 2024-07-05 ASSESSMENT — PAIN DESCRIPTION - FREQUENCY
FREQUENCY: CONTINUOUS

## 2024-07-05 ASSESSMENT — PAIN DESCRIPTION - LOCATION
LOCATION: LEG

## 2024-07-05 ASSESSMENT — PAIN DESCRIPTION - DESCRIPTORS
DESCRIPTORS: ACHING
DESCRIPTORS: SHARP
DESCRIPTORS: SHARP;STABBING
DESCRIPTORS: SHARP;STABBING
DESCRIPTORS: SHARP;SHOOTING
DESCRIPTORS: ACHING;DISCOMFORT;SHARP
DESCRIPTORS: SHARP
DESCRIPTORS: SHARP
DESCRIPTORS: SHOOTING;SHARP

## 2024-07-05 ASSESSMENT — PAIN SCALES - GENERAL
PAINLEVEL_OUTOF10: 10
PAINLEVEL_OUTOF10: 5
PAINLEVEL_OUTOF10: 8
PAINLEVEL_OUTOF10: 10
PAINLEVEL_OUTOF10: 6
PAINLEVEL_OUTOF10: 5
PAINLEVEL_OUTOF10: 10
PAINLEVEL_OUTOF10: 8
PAINLEVEL_OUTOF10: 3
PAINLEVEL_OUTOF10: 8
PAINLEVEL_OUTOF10: 9
PAINLEVEL_OUTOF10: 4

## 2024-07-05 ASSESSMENT — PAIN DESCRIPTION - ORIENTATION
ORIENTATION: LEFT
ORIENTATION: LEFT
ORIENTATION: LEFT;LOWER
ORIENTATION: LEFT
ORIENTATION: LEFT;LOWER
ORIENTATION: LOWER;LEFT
ORIENTATION: LEFT;LOWER
ORIENTATION: LEFT
ORIENTATION: LOWER;LEFT

## 2024-07-05 ASSESSMENT — PAIN DESCRIPTION - PAIN TYPE
TYPE: CHRONIC PAIN

## 2024-07-05 ASSESSMENT — PAIN DESCRIPTION - ONSET
ONSET: ON-GOING

## 2024-07-05 ASSESSMENT — PAIN - FUNCTIONAL ASSESSMENT
PAIN_FUNCTIONAL_ASSESSMENT: ACTIVITIES ARE NOT PREVENTED

## 2024-07-05 NOTE — PROGRESS NOTES
Physical Therapy  Facility/Department: [facility] MED SURG  Daily Treatment Note  NAME: Kaye Joyce  : 1945  MRN: [id number]    Date of Service: 2024    Discharge Recommendations:  Continue to assess pending progress, Subacute/Skilled Nursing Facility     Patient Diagnosis(es): The primary encounter diagnosis was Urinary tract infection without hematuria, site unspecified. Diagnoses of Hypomagnesemia, Fall, initial encounter, and Closed nondisplaced fracture of surgical neck of right humerus with routine healing, unspecified fracture morphology, subsequent encounter were also pertinent to this visit.    Assessment   Assessment: Transfers:CGA/SBA. Bed mobility:CGA/MinAx1. Pt. ambulated 35ftx1 with SC and CGA for safety. Pt. reported a decrease in pain when ambulating and stated \"its still there but its not 10/10\". Completed reclined and seated BLE therex x15 in all available planes of motion.  Activity Tolerance: Patient tolerated treatment well     Plan    Physical Therapy Plan  General Plan: 2 times a day 7 days a week  Specific Instructions for Next Treatment: 1x/ daily on weekends  Current Treatment Recommendations: Strengthening;ROM;Balance training;Functional mobility training;Transfer training;Gait training;Stair training;Manual;Home exercise program;Pain management;Safety education & training;Patient/Caregiver education & training;Equipment evaluation, education, & procurement;Positioning;Therapeutic activities     Restrictions  Restrictions/Precautions  Restrictions/Precautions: Fall Risk, Weight Bearing, General Precautions, Contact Precautions  Required Braces or Orthoses?: Yes  Upper Extremity Weight Bearing Restrictions  Right Upper Extremity Weight Bearing: Non Weight Bearing  Required Braces or Orthoses  Right Upper Extremity Brace/Splint: Right upper extremity in sling- nursing asked about more of an immobilizer brace, therapist informed nursing to check with central supply     Subjective   20-30' ther-ex in order to increase enduarance and ease ADLs    Education  Patient Education  Education Given To: Patient  Education Provided: Role of Therapy;Plan of Care;Equipment  Education Method: Verbal  Barriers to Learning: None  Education Outcome: Verbalized understanding    Therapy Time   Individual Concurrent Group Co-treatment   Time In 1245         Time Out 1305         Minutes 20           Jamilah Ku, PTA

## 2024-07-05 NOTE — PROGRESS NOTES
[place] is still waiting for insurance approval.  They have reach out to the insurance company again today.  MEJIA Snyder

## 2024-07-05 NOTE — PROGRESS NOTES
Progress Note    SUBJECTIVE:    Patient seen for f/u of Closed nondisplaced fracture of surgical neck of right humerus with routine healing.  She resting in chair no distress.  Continues to work with PT. No complaints.  Daily BMs.   Stated the pain in her left leg is worse.      ROS:   Constitutional: negative  for fevers, and negative for chills.  Respiratory: negative for shortness of breath, negative for cough, and negative for wheezing  Cardiovascular: negative for chest pain, and negative for palpitations  Gastrointestinal: negative for abdominal pain, negative for nausea,negative for vomiting, negative for diarrhea, and negative for constipation     All other systems were reviewed with the patient and are negative unless otherwise stated in HPI      OBJECTIVE:      Vitals:   Vitals:    07/05/24 0730   BP: (!) 141/67   Pulse: 53   Resp: 16   Temp: 97.7 °F (36.5 °C)   SpO2: 96%     Weight - Scale: 101.5 kg (223 lb 12.3 oz)   Height: 165.1 cm (5' 5\")     Weight  Wt Readings from Last 3 Encounters:   07/03/24 101.5 kg (223 lb 12.3 oz)   05/13/24 98.9 kg (218 lb)   04/11/24 98.6 kg (217 lb 4.8 oz)     Body mass index is 37.24 kg/m².    24HR INTAKE/OUTPUT:      Intake/Output Summary (Last 24 hours) at 7/5/2024 0803  Last data filed at 7/5/2024 0653  Gross per 24 hour   Intake 790 ml   Output 800 ml   Net -10 ml     -----------------------------------------------------------------  Exam:    GEN:    Awake, alert and oriented x3.   EYES:  EOMI, pupils equal   NECK: Supple. No lymphadenopathy.  No carotid bruit  CVS:    regular rate and rhythm, no audible murmur  PULM:  CTA, no wheezes, rales or rhonchi, no acute respiratory distress  ABD:    Bowels sounds normal.  Abdomen is soft.  No distention.  no tenderness to palpation.   EXT:   no edema bilaterally .  No calf tenderness. Right arm in a sling.  Pulse palpable.  Circ Checks WNL  NEURO: Moves all extremities.  Motor and sensory are grossly intact  SKIN:  No rashes.   needed for muscle spasm     Left lower leg pain  Condition is stable  Treatment plan:   Consult Dr Jorje Arana  PT eval  Imaging: No further imaging  Venous Doppler-negative for DVT  Medications:   Norco as needed for pain  Tizanidine as needed for muscle spasm  Start Lidocaine patch    Secondary hypercoagulable state/history DVT  Condition is a chronic stable condition  Treatment plan: Continue current treatment  Imaging: no further imaging studies ordered today  Medications:   Continue Eliquis     Nutrition status:   at risk for malnutrition  Dietician consult initiated     Hospital Prophylaxis:   DVT: Eliquis   Stress Ulcer: PPI      Disposition:  Shared decision making: All test results, treatment options and disposition options were discussed with the patient today  Social determinants of health that may impact management: Pt lives alone and is unable to care for self  Code status: Full Code   Disposition: Discharge plan is [place] when approved by insurance  Unavoidable Day    [facility] Advanced Care Planning documentation:  [x] I have confirmed that the patient's Advance Care Plan is present, Code Status is documented, or surrogate decision maker is listed in the patient's medical record  [If \"yes\", STOP HERE]     [] The patient's Advance Care Plan is NOT present because:    []  I confirmed today that the patient does not wish or was not able to name a   surrogate decision maker or provide and advance care plan.    [] Hospice care is currently being provided or has been provided within the   calendar year.    []  I did NOT confirm today the presence of an Advance Care Plan or surrogate   decision maker documented within the patient's medical record.   [DOES NOT SATISFY [facility] PERFORMANCE]    Nicki Batista, GRIS - CNP , APRN, NP-C  [facility]        7/5/2024, 8:03 AM

## 2024-07-05 NOTE — PROGRESS NOTES
Physical Therapy  Facility/Department: [facility] MED SURG  Daily Treatment Note  NAME: Kaye Joyce  : 1945  MRN: [id number]    Date of Service: 2024    Discharge Recommendations:  Continue to assess pending progress, Subacute/Skilled Nursing Facility     Patient Diagnosis(es): The primary encounter diagnosis was Urinary tract infection without hematuria, site unspecified. Diagnoses of Hypomagnesemia, Fall, initial encounter, and Closed nondisplaced fracture of surgical neck of right humerus with routine healing, unspecified fracture morphology, subsequent encounter were also pertinent to this visit.    Assessment   Assessment: Transfers: CGA. Pt. ambulated 30ftx1 with SC and CGA for safety. Pt. reported 5/10 L LE pain after ambulation. Demoed slow cadance and decreased BLE step length. Pt. completed reclined and seated BLE therex x15 in all available planes of motion, SLR x10. Completed seated hamstring curls, hip ABD- OTB x15.  Activity Tolerance: Patient tolerated treatment well     Plan    Physical Therapy Plan  General Plan: 2 times a day 7 days a week  Specific Instructions for Next Treatment: 1x/ daily on weekends  Current Treatment Recommendations: Strengthening;ROM;Balance training;Functional mobility training;Transfer training;Gait training;Stair training;Manual;Home exercise program;Pain management;Safety education & training;Patient/Caregiver education & training;Equipment evaluation, education, & procurement;Positioning;Therapeutic activities     Restrictions  Restrictions/Precautions  Restrictions/Precautions: Fall Risk, Weight Bearing, General Precautions, Contact Precautions  Required Braces or Orthoses?: Yes  Upper Extremity Weight Bearing Restrictions  Right Upper Extremity Weight Bearing: Non Weight Bearing  Required Braces or Orthoses  Right Upper Extremity Brace/Splint: Right upper extremity in sling- nursing asked about more of an immobilizer brace, therapist informed nursing to check  understanding    Therapy Time   Individual Concurrent Group Co-treatment   Time In 1105         Time Out 1129         Minutes 24             Jamilah Ku, PTA

## 2024-07-05 NOTE — PROGRESS NOTES
Pt sitting up in chair watching television. VS and assessment as charted. Pt is A&Ox4. Pain noted and patient aware of next available prn medication. Pt then assisted to bathroom using cane, then to bed. Positioned per comfort. Pt denies any other need and has call light within reach, will continue to monitor.

## 2024-07-06 PROCEDURE — 94761 N-INVAS EAR/PLS OXIMETRY MLT: CPT

## 2024-07-06 PROCEDURE — 6370000000 HC RX 637 (ALT 250 FOR IP): Performed by: INTERNAL MEDICINE

## 2024-07-06 PROCEDURE — 1200000000 HC SEMI PRIVATE

## 2024-07-06 PROCEDURE — 97110 THERAPEUTIC EXERCISES: CPT

## 2024-07-06 PROCEDURE — 6370000000 HC RX 637 (ALT 250 FOR IP)

## 2024-07-06 PROCEDURE — 6370000000 HC RX 637 (ALT 250 FOR IP): Performed by: NURSE PRACTITIONER

## 2024-07-06 PROCEDURE — 94664 DEMO&/EVAL PT USE INHALER: CPT

## 2024-07-06 PROCEDURE — 6370000000 HC RX 637 (ALT 250 FOR IP): Performed by: STUDENT IN AN ORGANIZED HEALTH CARE EDUCATION/TRAINING PROGRAM

## 2024-07-06 RX ORDER — METHYLPREDNISOLONE 4 MG/1
4 TABLET ORAL
Status: DISCONTINUED | OUTPATIENT
Start: 2024-07-07 | End: 2024-07-06

## 2024-07-06 RX ORDER — METHYLPREDNISOLONE 4 MG/1
4 TABLET ORAL SEE ADMIN INSTRUCTIONS
Status: DISCONTINUED | OUTPATIENT
Start: 2024-07-06 | End: 2024-07-06 | Stop reason: SDUPTHER

## 2024-07-06 RX ORDER — OXYCODONE HYDROCHLORIDE AND ACETAMINOPHEN 5; 325 MG/1; MG/1
2 TABLET ORAL EVERY 4 HOURS PRN
Status: DISCONTINUED | OUTPATIENT
Start: 2024-07-06 | End: 2024-07-08 | Stop reason: HOSPADM

## 2024-07-06 RX ORDER — PREDNISONE 5 MG/1
5 TABLET ORAL 2 TIMES DAILY
Status: DISCONTINUED | OUTPATIENT
Start: 2024-07-06 | End: 2024-07-08 | Stop reason: HOSPADM

## 2024-07-06 RX ORDER — METHYLPREDNISOLONE 4 MG/1
24 TABLET ORAL ONCE
Status: DISCONTINUED | OUTPATIENT
Start: 2024-07-06 | End: 2024-07-06

## 2024-07-06 RX ORDER — METHYLPREDNISOLONE 4 MG/1
4 TABLET ORAL NIGHTLY
Status: DISCONTINUED | OUTPATIENT
Start: 2024-07-08 | End: 2024-07-06

## 2024-07-06 RX ORDER — MELOXICAM 15 MG/1
15 TABLET ORAL ONCE
Status: COMPLETED | OUTPATIENT
Start: 2024-07-06 | End: 2024-07-06

## 2024-07-06 RX ORDER — METHYLPREDNISOLONE 4 MG/1
8 TABLET ORAL NIGHTLY
Status: DISCONTINUED | OUTPATIENT
Start: 2024-07-07 | End: 2024-07-06

## 2024-07-06 RX ADMIN — TIZANIDINE 2 MG: 4 TABLET ORAL at 03:17

## 2024-07-06 RX ADMIN — APIXABAN 2.5 MG: 2.5 TABLET, FILM COATED ORAL at 20:36

## 2024-07-06 RX ADMIN — PREDNISONE 5 MG: 5 TABLET ORAL at 08:42

## 2024-07-06 RX ADMIN — OXYCODONE HYDROCHLORIDE AND ACETAMINOPHEN 500 MG: 500 TABLET ORAL at 08:43

## 2024-07-06 RX ADMIN — TIZANIDINE 2 MG: 4 TABLET ORAL at 21:19

## 2024-07-06 RX ADMIN — APIXABAN 2.5 MG: 2.5 TABLET, FILM COATED ORAL at 08:43

## 2024-07-06 RX ADMIN — DOCUSATE SODIUM 100 MG: 100 CAPSULE, LIQUID FILLED ORAL at 08:42

## 2024-07-06 RX ADMIN — ATENOLOL 100 MG: 50 TABLET ORAL at 08:43

## 2024-07-06 RX ADMIN — MELOXICAM 15 MG: 15 TABLET ORAL at 20:36

## 2024-07-06 RX ADMIN — HYDROCODONE BITARTRATE AND ACETAMINOPHEN 2 TABLET: 5; 325 TABLET ORAL at 03:17

## 2024-07-06 RX ADMIN — PREDNISONE 5 MG: 5 TABLET ORAL at 20:53

## 2024-07-06 RX ADMIN — PANTOPRAZOLE SODIUM 40 MG: 40 TABLET, DELAYED RELEASE ORAL at 08:42

## 2024-07-06 RX ADMIN — CHLORTHALIDONE 25 MG: 25 TABLET ORAL at 08:42

## 2024-07-06 RX ADMIN — OXYCODONE HYDROCHLORIDE AND ACETAMINOPHEN 2 TABLET: 5; 325 TABLET ORAL at 23:08

## 2024-07-06 RX ADMIN — OXYCODONE HYDROCHLORIDE AND ACETAMINOPHEN 2 TABLET: 5; 325 TABLET ORAL at 09:26

## 2024-07-06 RX ADMIN — OXYCODONE HYDROCHLORIDE AND ACETAMINOPHEN 2 TABLET: 5; 325 TABLET ORAL at 18:40

## 2024-07-06 RX ADMIN — OXYCODONE HYDROCHLORIDE AND ACETAMINOPHEN 2 TABLET: 5; 325 TABLET ORAL at 14:28

## 2024-07-06 RX ADMIN — DOCUSATE SODIUM 100 MG: 100 CAPSULE, LIQUID FILLED ORAL at 20:36

## 2024-07-06 RX ADMIN — SERTRALINE HYDROCHLORIDE 50 MG: 50 TABLET ORAL at 08:43

## 2024-07-06 RX ADMIN — OXYBUTYNIN CHLORIDE 15 MG: 5 TABLET, EXTENDED RELEASE ORAL at 08:42

## 2024-07-06 ASSESSMENT — PAIN DESCRIPTION - ONSET
ONSET: ON-GOING

## 2024-07-06 ASSESSMENT — PAIN DESCRIPTION - DESCRIPTORS
DESCRIPTORS: ACHING;DISCOMFORT
DESCRIPTORS: ACHING
DESCRIPTORS: SHARP

## 2024-07-06 ASSESSMENT — PAIN DESCRIPTION - LOCATION
LOCATION: LEG
LOCATION: LEG
LOCATION: ARM
LOCATION: LEG

## 2024-07-06 ASSESSMENT — PAIN SCALES - GENERAL
PAINLEVEL_OUTOF10: 9
PAINLEVEL_OUTOF10: 2
PAINLEVEL_OUTOF10: 8
PAINLEVEL_OUTOF10: 4
PAINLEVEL_OUTOF10: 6
PAINLEVEL_OUTOF10: 0
PAINLEVEL_OUTOF10: 8
PAINLEVEL_OUTOF10: 9

## 2024-07-06 ASSESSMENT — PAIN DESCRIPTION - ORIENTATION
ORIENTATION: LEFT
ORIENTATION: RIGHT
ORIENTATION: LEFT
ORIENTATION: LOWER;LEFT

## 2024-07-06 ASSESSMENT — PAIN DESCRIPTION - FREQUENCY
FREQUENCY: CONTINUOUS

## 2024-07-06 ASSESSMENT — PAIN DESCRIPTION - PAIN TYPE
TYPE: CHRONIC PAIN

## 2024-07-06 NOTE — PLAN OF CARE
Problem: Discharge Planning  Goal: Discharge to home or other facility with appropriate resources  Outcome: Progressing     Problem: Pain  Goal: Verbalizes/displays adequate comfort level or baseline comfort level  Outcome: Progressing     Problem: Chronic Conditions and Co-morbidities  Goal: Patient's chronic conditions and co-morbidity symptoms are monitored and maintained or improved  Outcome: Progressing     Problem: Skin/Tissue Integrity  Goal: Absence of new skin breakdown  Description: 1.  Monitor for areas of redness and/or skin breakdown  2.  Assess vascular access sites hourly  3.  Every 4-6 hours minimum:  Change oxygen saturation probe site  4.  Every 4-6 hours:  If on nasal continuous positive airway pressure, respiratory therapy assess nares and determine need for appliance change or resting period.  Outcome: Progressing     Problem: Nutrition Deficit:  Goal: Optimize nutritional status  Outcome: Progressing

## 2024-07-06 NOTE — PROGRESS NOTES
Writer to bedside to complete morning assessment. Upon entry to room, pt laying in bed, respirations even and unlabored while on room air. Vitals obtained and assessment completed, see flow sheet for details. Pt denies needs from writer at this time. Call light in reach. Bed alarm active. Care ongoing.

## 2024-07-06 NOTE — PROGRESS NOTES
Pt sitting up in chair watching television and finishing snack. VS and assessment as charted. Pt is A&Ox4. PRN Percocet administered per pain. Pt denies any other needs at this time and has call light within reach, will continue to monitor.

## 2024-07-06 NOTE — PROGRESS NOTES
Patient assisted to bathroom and back to bed. VS rechecked as charted. Pt noted pain has not been relieved as well as it had been with Norco previously, requests to try a Zanaflex with current dose. Medications administered at this time and patient positioned per comfort. Denies any other needs and has call light within reach.

## 2024-07-06 NOTE — RT PROTOCOL NOTE
24 bpm []  Access- ory muscle use at rest. Abn.  resp. []  SOB at rest.   0   Bilateral Breath Sounds (BBS) [x]  Clear []  Diminish-ed bases  []  Diminish-ed t/o, or rales   []  Sporadic, scattered wheezes or rhonchi []  Persistentwheezes and, or absent BBS 0   Cough [x]  Strong, effective, & non-prod. []  Effective & prod. Less than 25 ml (2 TBSP) over past 24 hrs []  Ineffective & non-prod to less than 25 ML over past 24 hrs []  Ineffective and, or greater than 25 ml sputum prod. past 24 hrs. []  Nonspon- taneous; Requires suctioning 0   Pulmonary History  (PULM HX) [x]  No smoking and no chronic pulmonary history []  Former smoker. Quit over 12 mos. ago []  Current smoker or quit w/ in 12 mos []  Pulm. History and, or 20 pk/yr smoking hx []  Admitted w/ acute pulm. dx and, or has been admitted w/ pulm. dx 2 or more times over past 12 mos 0   Surgical History this Admit  (SURG HX) [x]  No surgery []  General surgery []  Lower abdominal []  Thoracic or upper abdominal   []  Thoracic w/ pulm. disease 0   Chest X-Ray (CXR)/CT Scan [x]  Clear or not applicable []  Not available []  Atelectasis or pleural effusions []  Localized infiltrate or pulm. edema []  Con-solidated Infiltrates, bilateral, or in more than 1 lobe 0   TOTAL ACUITY: 0       CARE PLAN    If Acuity Level is 2, 3, or 4 in any of the following:    [] BILATERAL BREATH SOUNDS (BBS)     [] PULMONARY HISTORY (PULM HX)  [] Respiratory Rate  (RR)    Goal: Improve respiratory functions in patients with airway disease and decrease WOB    [] AEROSOL PROTOCOL    Total Acuity:   14-28  []  Secondary Assessment in 24 hrs Total Acuity:  9-13  []  Secondary Assessment in 24 hrs Total Acuity:  4-8  []  Secondary Assessment in 24 hrs Total Acuity:  0-3  []  Secondary Assessment in 48 hrs   HHN AEROSOL THERAPY with  [physician-ordered bronchodilator(s)] q 4 & Albuterol PRN q2 hrs.   Breath-Actuated Neb if BBS Acuity = 4, and pt. can use MP. Notify physician if condition  (franchesca with an X)  ____Yes    ____ No     ____ NA    Smoking Cessation Booklet given:  ____Yes  ____No ____Patient Refused

## 2024-07-06 NOTE — PLAN OF CARE
Problem: Discharge Planning  Goal: Discharge to home or other facility with appropriate resources  7/6/2024 1041 by Hilary Stanley RN  Outcome: Progressing  Flowsheets (Taken 7/6/2024 1041)  Discharge to home or other facility with appropriate resources:   Identify barriers to discharge with patient and caregiver   Arrange for needed discharge resources and transportation as appropriate   Identify discharge learning needs (meds, wound care, etc)     Problem: Pain  Goal: Verbalizes/displays adequate comfort level or baseline comfort level  7/6/2024 1041 by Hilary Stanley RN  Outcome: Progressing  Flowsheets (Taken 7/6/2024 1041)  Verbalizes/displays adequate comfort level or baseline comfort level:   Assess pain using appropriate pain scale   Encourage patient to monitor pain and request assistance   Administer analgesics based on type and severity of pain and evaluate response   Implement non-pharmacological measures as appropriate and evaluate response   Consider cultural and social influences on pain and pain management     Problem: Safety - Adult  Goal: Free from fall injury  Outcome: Progressing  Flowsheets (Taken 7/6/2024 1041)  Free From Fall Injury: Instruct family/caregiver on patient safety     Problem: Chronic Conditions and Co-morbidities  Goal: Patient's chronic conditions and co-morbidity symptoms are monitored and maintained or improved  7/6/2024 1041 by Hilary Stanley RN  Outcome: Progressing  Flowsheets (Taken 7/6/2024 1041)  Care Plan - Patient's Chronic Conditions and Co-Morbidity Symptoms are Monitored and Maintained or Improved:   Monitor and assess patient's chronic conditions and comorbid symptoms for stability, deterioration, or improvement   Collaborate with multidisciplinary team to address chronic and comorbid conditions and prevent exacerbation or deterioration     Problem: Skin/Tissue Integrity  Goal: Absence of new skin breakdown  Description: 1.  Monitor for areas of redness and/or

## 2024-07-06 NOTE — PROGRESS NOTES
Physical Therapy  Facility/Department: [facility] MED SURG  Daily Treatment Note  NAME: Kaye Joyce  : 1945  MRN: [id number]    Date of Service: 2024    Discharge Recommendations:  Continue to assess pending progress, Subacute/Skilled Nursing Facility     Patient Diagnosis(es): The primary encounter diagnosis was Urinary tract infection without hematuria, site unspecified. Diagnoses of Hypomagnesemia, Fall, initial encounter, and Closed nondisplaced fracture of surgical neck of right humerus with routine healing, unspecified fracture morphology, subsequent encounter were also pertinent to this visit.    Assessment   Assessment: Transfers:MinAx1. Pt. sat at sink side while brushing teeth and combing hair with no LOB noted. Pt. ambulated 15ftx1 with SC and CGA for safety. Demoed slow cadance and decreased L LE step length. Pt. performed reclined and seated BLE therex x15 in all available planes of motion.  Activity Tolerance: Patient tolerated treatment well     Plan    Physical Therapy Plan  General Plan: 2 times a day 7 days a week  Specific Instructions for Next Treatment: 1x/ daily on weekends  Current Treatment Recommendations: Strengthening;ROM;Balance training;Functional mobility training;Transfer training;Gait training;Stair training;Manual;Home exercise program;Pain management;Safety education & training;Patient/Caregiver education & training;Equipment evaluation, education, & procurement;Positioning;Therapeutic activities     Restrictions  Restrictions/Precautions  Restrictions/Precautions: Fall Risk, Weight Bearing, General Precautions, Contact Precautions  Required Braces or Orthoses?: Yes  Upper Extremity Weight Bearing Restrictions  Right Upper Extremity Weight Bearing: Non Weight Bearing  Required Braces or Orthoses  Right Upper Extremity Brace/Splint: Right upper extremity in sling- nursing asked about more of an immobilizer brace, therapist informed nursing to check with central supply      Subjective    Subjective  Subjective: Pt. in bathroom upon arrival, getting washed up, agreeable to therapy at this time. Pt. reported increased pain overnight and was hesistant with working with therapy, stated with already being up she was willing to perform therex in the chair at this time.  Pain: 5-6/10 L LE pain  Orientation  Overall Orientation Status: Within Functional Limits     Objective   Bed Mobility Training  Bed Mobility Training: No  Transfer Training  Transfer Training: Yes  Overall Level of Assistance: Minimum assistance;Assist X1  Interventions: Verbal cues  Sit to Stand: Minimum assistance;Assist X1  Stand to Sit: Minimum assistance;Assist X1  Gait  Gait Training: Yes  Overall Level of Assistance: Contact-guard assistance;Assist X1;Additional time  Distance (ft): 15 Feet  Assistive Device: Cane, straight  Interventions: Safety awareness training  Speed/Tatiana: Slow  Step Length: Left shortened  Gait Abnormalities: Antalgic  PT Exercises  Exercise Treatment: reclined and seated BLE therex x15 in all available planes of motion.  Safety Devices  Type of Devices: Call light within reach;Nurse notified;All fall risk precautions in place;Gait belt;Chair alarm in place;Left in chair       Goals  Short Term Goals  Time Frame for Short Term Goals: 10 visits  Short Term Goal 1: Patient will complete bed mobility and transfers with SBAx1 in order to ease functional mobility  Short Term Goal 2: Patient will ambulate 50 feetx1 with SC and CGAx1 in order to ease functional mobility  Short Term Goal 3: Patient will ascend/descend three steps with handrail and CGAX1 in order to safely enter/exit the home  Short Term Goal 4: Patient will tolerate 20-30' ther-ex in order to increase enduarance and ease ADLs    Education  Patient Education  Education Given To: Patient  Education Provided: Role of Therapy;Plan of Care;Equipment  Education Method: Verbal  Barriers to Learning: None  Education Outcome: Verbalized

## 2024-07-06 NOTE — PROGRESS NOTES
Progress Note    SUBJECTIVE:  FU related to still having quite a bit of pain in her leg.  Nothing really in the arm.  The pain is located laterally.  Sounds like trochanteric bursitis/IT band    OBJECTIVE:    Vitals:   TEMPERATURE:  Current - Temp: 97.2 °F (36.2 °C); Max - Temp  Av.2 °F (36.2 °C)  Min: 96.8 °F (36 °C)  Max: 97.9 °F (36.6 °C)  RESPIRATIONS RANGE: Resp  Av  Min: 18  Max: 18  PULSE RANGE: Pulse  Av.5  Min: 52  Max: 58  BLOOD PRESSURE RANGE:  Systolic (24hrs), Av , Min:122 , Max:148   ; Diastolic (24hrs), Av, Min:57, Max:77    PULSE OXIMETRY RANGE: SpO2  Av.7 %  Min: 92 %  Max: 95 %  24HR INTAKE/OUTPUT:    Intake/Output Summary (Last 24 hours) at 2024 0842  Last data filed at 2024 1500  Gross per 24 hour   Intake 600 ml   Output 300 ml   Net 300 ml     -----------------------------------------------------------------  Exam:  General: A & O x3 and alert  HEENT: Head: Normal, normocephalic, atraumatic.  Supple neck & negative  Heart: Regular  Lungs: clear to auscultation bilaterally & no retractions  Abdomen: Normal & soft, No tenderness and BS normal  Extremities:  No edema   Neuro: Right arm immobilizer, NonFocal     -----------------------------------------------------------------  Diagnostic Data:  Lab Results   Component Value Date    WBC 7.3 2024    HGB 13.2 2024     2024       Lab Results   Component Value Date    BUN 25 (H) 2024    CREATININE 1.0 (H) 2024     2024    K 3.6 (L) 2024    CALCIUM 10.2 2024    CL 98 2024    CO2 33 (H) 2024    LABGLOM 57 (L) 2024       Lab Results   Component Value Date    WBCUA 10 TO 20 2024    RBCUA 0 TO 2 2024    LEUKOCYTESUR SMALL (A) 2024    GLUCOSEU NEGATIVE 2024    KETUA NEGATIVE 2024    PROTEINU NEGATIVE 2024    HGBUR NEGATIVE 2024    CASTUA NOT REPORTED 2021    BACTERIA 3+ (A) 2024    YEAST NOT  acromioclavicular joint appears normal.  There is no evidence for any fracture or dislocation.  Clavicle appears normal.     1. Advanced osteoarthritis of the glenohumeral joint. 2. No evidence of any fracture or dislocation.     XR CHEST PORTABLE    Result Date: 6/27/2024  EXAMINATION: ONE XRAY VIEW OF THE CHEST 6/27/2024 4:26 pm COMPARISON: 01/21/2024 HISTORY: ORDERING SYSTEM PROVIDED HISTORY: fall TECHNOLOGIST PROVIDED HISTORY: fall FINDINGS: The lungs are without acute focal process.  There is no effusion or pneumothorax. The cardiomediastinal silhouette is stable. The osseous structures are stable.  Unchanged elevated right hemidiaphragm.     No acute process.     Vascular duplex lower extremity venous left    Result Date: 6/27/2024    No evidence of deep vein or superficial vein thrombosis in the left lower extremity. Vessels demonstrate normal compressibility, color filling, and phasic and spontaneous flow.   For comparison purposes, the right common femoral vein was briefly interrogated. The vein demonstrates normal color filling and compressibility. Doppler flow was phasic and spontaneous.       ASSESSMENT:    Principal Problem:    Closed nondisplaced fracture of surgical neck of right humerus with routine healing  Active Problems:    Essential hypertension    Iron deficiency anemia    UTI (urinary tract infection)    TALAT (obstructive sleep apnea)    Secondary hypercoagulable state (HCC)  Resolved Problems:    * No resolved hospital problems. *      Patient Active Problem List    Diagnosis Date Noted    Chest pain 02/16/2023    Drug intolerance 02/14/2023    Iron deficiency anemia due to chronic blood loss 02/13/2023    Chronic kidney disease, stage 2 (mild) 12/18/2022    Bilateral pulmonary embolism (HCC) 12/17/2022    Essential hypertension 12/17/2022    UTI (urinary tract infection) 06/27/2024    TALAT (obstructive sleep apnea) 06/27/2024    Secondary hypercoagulable state (HCC) 06/27/2024    Chronic

## 2024-07-07 PROCEDURE — 6370000000 HC RX 637 (ALT 250 FOR IP): Performed by: NURSE PRACTITIONER

## 2024-07-07 PROCEDURE — 94761 N-INVAS EAR/PLS OXIMETRY MLT: CPT

## 2024-07-07 PROCEDURE — 97110 THERAPEUTIC EXERCISES: CPT

## 2024-07-07 PROCEDURE — 6370000000 HC RX 637 (ALT 250 FOR IP)

## 2024-07-07 PROCEDURE — 6370000000 HC RX 637 (ALT 250 FOR IP): Performed by: INTERNAL MEDICINE

## 2024-07-07 PROCEDURE — 6370000000 HC RX 637 (ALT 250 FOR IP): Performed by: STUDENT IN AN ORGANIZED HEALTH CARE EDUCATION/TRAINING PROGRAM

## 2024-07-07 PROCEDURE — 97116 GAIT TRAINING THERAPY: CPT

## 2024-07-07 PROCEDURE — 1200000000 HC SEMI PRIVATE

## 2024-07-07 RX ADMIN — OXYBUTYNIN CHLORIDE 15 MG: 5 TABLET, EXTENDED RELEASE ORAL at 08:33

## 2024-07-07 RX ADMIN — OXYCODONE HYDROCHLORIDE AND ACETAMINOPHEN 2 TABLET: 5; 325 TABLET ORAL at 08:34

## 2024-07-07 RX ADMIN — CHLORTHALIDONE 25 MG: 25 TABLET ORAL at 08:34

## 2024-07-07 RX ADMIN — SERTRALINE HYDROCHLORIDE 50 MG: 50 TABLET ORAL at 08:34

## 2024-07-07 RX ADMIN — OXYCODONE HYDROCHLORIDE AND ACETAMINOPHEN 2 TABLET: 5; 325 TABLET ORAL at 03:11

## 2024-07-07 RX ADMIN — PREDNISONE 5 MG: 5 TABLET ORAL at 20:29

## 2024-07-07 RX ADMIN — FERROUS SULFATE TAB 325 MG (65 MG ELEMENTAL FE) 325 MG: 325 (65 FE) TAB at 08:34

## 2024-07-07 RX ADMIN — APIXABAN 2.5 MG: 2.5 TABLET, FILM COATED ORAL at 20:29

## 2024-07-07 RX ADMIN — APIXABAN 2.5 MG: 2.5 TABLET, FILM COATED ORAL at 08:33

## 2024-07-07 RX ADMIN — OXYCODONE HYDROCHLORIDE AND ACETAMINOPHEN 500 MG: 500 TABLET ORAL at 08:33

## 2024-07-07 RX ADMIN — TIZANIDINE 2 MG: 4 TABLET ORAL at 20:29

## 2024-07-07 RX ADMIN — PREDNISONE 5 MG: 5 TABLET ORAL at 08:34

## 2024-07-07 RX ADMIN — OXYCODONE HYDROCHLORIDE AND ACETAMINOPHEN 2 TABLET: 5; 325 TABLET ORAL at 20:28

## 2024-07-07 RX ADMIN — ATENOLOL 100 MG: 50 TABLET ORAL at 08:34

## 2024-07-07 RX ADMIN — PANTOPRAZOLE SODIUM 40 MG: 40 TABLET, DELAYED RELEASE ORAL at 08:34

## 2024-07-07 RX ADMIN — DOCUSATE SODIUM 100 MG: 100 CAPSULE, LIQUID FILLED ORAL at 08:34

## 2024-07-07 RX ADMIN — TIZANIDINE 2 MG: 4 TABLET ORAL at 03:11

## 2024-07-07 RX ADMIN — DOCUSATE SODIUM 100 MG: 100 CAPSULE, LIQUID FILLED ORAL at 20:29

## 2024-07-07 ASSESSMENT — PAIN DESCRIPTION - LOCATION
LOCATION: LEG

## 2024-07-07 ASSESSMENT — PAIN DESCRIPTION - ORIENTATION
ORIENTATION: LEFT
ORIENTATION: RIGHT
ORIENTATION: LEFT;LOWER
ORIENTATION: LEFT
ORIENTATION: LOWER;LEFT
ORIENTATION: LOWER;LEFT
ORIENTATION: LEFT

## 2024-07-07 ASSESSMENT — PAIN SCALES - GENERAL
PAINLEVEL_OUTOF10: 10
PAINLEVEL_OUTOF10: 3
PAINLEVEL_OUTOF10: 2
PAINLEVEL_OUTOF10: 2
PAINLEVEL_OUTOF10: 5
PAINLEVEL_OUTOF10: 10
PAINLEVEL_OUTOF10: 8
PAINLEVEL_OUTOF10: 7

## 2024-07-07 ASSESSMENT — PAIN DESCRIPTION - ONSET
ONSET: ON-GOING

## 2024-07-07 ASSESSMENT — PAIN DESCRIPTION - DESCRIPTORS
DESCRIPTORS: ACHING

## 2024-07-07 ASSESSMENT — PAIN DESCRIPTION - PAIN TYPE
TYPE: CHRONIC PAIN

## 2024-07-07 ASSESSMENT — PAIN DESCRIPTION - FREQUENCY
FREQUENCY: CONTINUOUS

## 2024-07-07 NOTE — PROGRESS NOTES
Physical Therapy  Facility/Department: [facility] [place] [facility] MED SURG  Daily Treatment Note  NAME: Kaye Joyce  : 1945  MRN: [id number]    Date of Service: 2024    Discharge Recommendations:  Continue to assess pending progress, Subacute/Skilled Nursing Facility     Patient Diagnosis(es): The primary encounter diagnosis was Urinary tract infection without hematuria, site unspecified. Diagnoses of Hypomagnesemia, Fall, initial encounter, and Closed nondisplaced fracture of surgical neck of right humerus with routine healing, unspecified fracture morphology, subsequent encounter were also pertinent to this visit.    Assessment   Assessment: Transfers:CGA. Pt. completed reclined seated BLE therex x20 in all available planes of motion. Pt. ambulated 30ftx1 in room with SC and CGA/SBA for safety. Demoed slow cadance and decreased BLE step length. Pt. reported increased pain during ambulation and required to sit back down. Will continue to progress pt. as tolerated d/t pain.  Activity Tolerance: Patient tolerated treatment well     Plan    Physical Therapy Plan  General Plan: 2 times a day 7 days a week  Specific Instructions for Next Treatment: 1x/ daily on weekends  Current Treatment Recommendations: Strengthening;ROM;Balance training;Functional mobility training;Transfer training;Gait training;Stair training;Manual;Home exercise program;Pain management;Safety education & training;Patient/Caregiver education & training;Equipment evaluation, education, & procurement;Positioning;Therapeutic activities     Restrictions  Restrictions/Precautions  Restrictions/Precautions: Fall Risk, Weight Bearing, General Precautions, Contact Precautions  Required Braces or Orthoses?: Yes  Upper Extremity Weight Bearing Restrictions  Right Upper Extremity Weight Bearing: Non Weight Bearing  Required Braces or Orthoses  Right Upper Extremity Brace/Splint: Right upper extremity in sling- nursing asked about more of an immobilizer brace,

## 2024-07-07 NOTE — PLAN OF CARE
Problem: Discharge Planning  Goal: Discharge to home or other facility with appropriate resources  7/7/2024 1021 by Hilary Stanley RN  Outcome: Progressing     Problem: Pain  Goal: Verbalizes/displays adequate comfort level or baseline comfort level  7/7/2024 1021 by Hilary Stanley RN  Outcome: Progressing     Problem: Safety - Adult  Goal: Free from fall injury  7/7/2024 1021 by Hilary Stanley RN  Outcome: Progressing     Problem: Chronic Conditions and Co-morbidities  Goal: Patient's chronic conditions and co-morbidity symptoms are monitored and maintained or improved  7/7/2024 1021 by Hilary Stanley RN  Outcome: Progressing     Problem: Skin/Tissue Integrity  Goal: Absence of new skin breakdown  Description: 1.  Monitor for areas of redness and/or skin breakdown  2.  Assess vascular access sites hourly  3.  Every 4-6 hours minimum:  Change oxygen saturation probe site  4.  Every 4-6 hours:  If on nasal continuous positive airway pressure, respiratory therapy assess nares and determine need for appliance change or resting period.  7/7/2024 1021 by Hilary Stanley RN  Outcome: Progressing     Problem: Nutrition Deficit:  Goal: Optimize nutritional status  7/7/2024 1021 by Hilary Stanley RN  Outcome: Progressing     Problem: Musculoskeletal - Adult  Goal: Return mobility to safest level of function  Outcome: Progressing     Problem: Musculoskeletal - Adult  Goal: Maintain proper alignment of affected body part  7/7/2024 1021 by Hilary Stanley RN  Outcome: Progressing

## 2024-07-07 NOTE — PROGRESS NOTES
Progress Note    SUBJECTIVE:  FU related to still having quite a bit of pain in her leg.  Nothing really in the arm.  The pain is located laterally.  Sounds like trochanteric bursitis/IT band    OBJECTIVE:    Vitals:   TEMPERATURE:  Current - Temp: 97.6 °F (36.4 °C); Max - Temp  Av.5 °F (36.4 °C)  Min: 96.8 °F (36 °C)  Max: 98.6 °F (37 °C)  RESPIRATIONS RANGE: Resp  Av.4  Min: 16  Max: 18  PULSE RANGE: Pulse  Av.5  Min: 54  Max: 58  BLOOD PRESSURE RANGE:  Systolic (24hrs), Av , Min:115 , Max:137   ; Diastolic (24hrs), Av, Min:53, Max:69    PULSE OXIMETRY RANGE: SpO2  Av.8 %  Min: 94 %  Max: 96 %  24HR INTAKE/OUTPUT:    Intake/Output Summary (Last 24 hours) at 2024 0812  Last data filed at 2024  Gross per 24 hour   Intake 800 ml   Output 600 ml   Net 200 ml     -----------------------------------------------------------------  Exam:  General: A & O x3 and alert  HEENT: Head: Normal, normocephalic, atraumatic.  Supple neck & negative  Heart: Regular  Lungs: clear to auscultation bilaterally & no retractions  Abdomen: Normal & soft, No tenderness and BS normal  Extremities:  No edema   Neuro: Right arm immobilizer, NonFocal     -----------------------------------------------------------------  Diagnostic Data:  Lab Results   Component Value Date    WBC 7.3 2024    HGB 13.2 2024     2024       Lab Results   Component Value Date    BUN 25 (H) 2024    CREATININE 1.0 (H) 2024     2024    K 3.6 (L) 2024    CALCIUM 10.2 2024    CL 98 2024    CO2 33 (H) 2024    LABGLOM 57 (L) 2024       Lab Results   Component Value Date    WBCUA 10 TO 20 2024    RBCUA 0 TO 2 2024    LEUKOCYTESUR SMALL (A) 2024    GLUCOSEU NEGATIVE 2024    KETUA NEGATIVE 2024    PROTEINU NEGATIVE 2024    HGBUR NEGATIVE 2024    CASTUA NOT REPORTED 2021    BACTERIA 3+ (A) 2024    YEAST NOT  acromioclavicular joint appears normal.  There is no evidence for any fracture or dislocation.  Clavicle appears normal.     1. Advanced osteoarthritis of the glenohumeral joint. 2. No evidence of any fracture or dislocation.     XR CHEST PORTABLE    Result Date: 6/27/2024  EXAMINATION: ONE XRAY VIEW OF THE CHEST 6/27/2024 4:26 pm COMPARISON: 01/21/2024 HISTORY: ORDERING SYSTEM PROVIDED HISTORY: fall TECHNOLOGIST PROVIDED HISTORY: fall FINDINGS: The lungs are without acute focal process.  There is no effusion or pneumothorax. The cardiomediastinal silhouette is stable. The osseous structures are stable.  Unchanged elevated right hemidiaphragm.     No acute process.     Vascular duplex lower extremity venous left    Result Date: 6/27/2024    No evidence of deep vein or superficial vein thrombosis in the left lower extremity. Vessels demonstrate normal compressibility, color filling, and phasic and spontaneous flow.   For comparison purposes, the right common femoral vein was briefly interrogated. The vein demonstrates normal color filling and compressibility. Doppler flow was phasic and spontaneous.       ASSESSMENT:    Principal Problem:    Closed nondisplaced fracture of surgical neck of right humerus with routine healing  Active Problems:    Essential hypertension    Iron deficiency anemia    UTI (urinary tract infection)    TALAT (obstructive sleep apnea)    Secondary hypercoagulable state (HCC)  Resolved Problems:    * No resolved hospital problems. *      Patient Active Problem List    Diagnosis Date Noted    Chest pain 02/16/2023    Drug intolerance 02/14/2023    Iron deficiency anemia due to chronic blood loss 02/13/2023    Chronic kidney disease, stage 2 (mild) 12/18/2022    Bilateral pulmonary embolism (HCC) 12/17/2022    Essential hypertension 12/17/2022    UTI (urinary tract infection) 06/27/2024    TALAT (obstructive sleep apnea) 06/27/2024    Secondary hypercoagulable state (HCC) 06/27/2024    Chronic

## 2024-07-07 NOTE — PROGRESS NOTES
Writer to bedside to complete morning assessment. Upon entry to room, pt sitting on edge of bed, respirations even and unlabored while on room air. Vitals obtained and assessment completed, see flow sheet for details. Lung sounds clear throughout.  Pt denies needs from writer at this time. Call light in reach. Bed alarm active. Care ongoing.

## 2024-07-07 NOTE — PLAN OF CARE
Problem: Discharge Planning  Goal: Discharge to home or other facility with appropriate resources  7/6/2024 2319 by Vesna Blackburn RN  Outcome: Progressing     Problem: Pain  Goal: Verbalizes/displays adequate comfort level or baseline comfort level  7/6/2024 2319 by Vesna Blackburn RN  Outcome: Progressing  Flowsheets (Taken 7/6/2024 2319)  Verbalizes/displays adequate comfort level or baseline comfort level:   Encourage patient to monitor pain and request assistance   Assess pain using appropriate pain scale   Implement non-pharmacological measures as appropriate and evaluate response   Administer analgesics based on type and severity of pain and evaluate response     Problem: Safety - Adult  Goal: Free from fall injury  7/6/2024 2319 by Vesna Blackburn RN  Outcome: Progressing     Problem: Chronic Conditions and Co-morbidities  Goal: Patient's chronic conditions and co-morbidity symptoms are monitored and maintained or improved  7/6/2024 2319 by Vesna Blackburn RN  Outcome: Progressing     Problem: Skin/Tissue Integrity  Goal: Absence of new skin breakdown  Description: 1.  Monitor for areas of redness and/or skin breakdown  2.  Assess vascular access sites hourly  3.  Every 4-6 hours minimum:  Change oxygen saturation probe site  4.  Every 4-6 hours:  If on nasal continuous positive airway pressure, respiratory therapy assess nares and determine need for appliance change or resting period.  7/6/2024 2319 by Vesna Blackburn RN  Outcome: Progressing     Problem: Nutrition Deficit:  Goal: Optimize nutritional status  Outcome: Progressing     Problem: Musculoskeletal - Adult  Goal: Maintain proper alignment of affected body part  Outcome: Progressing  Flowsheets (Taken 7/6/2024 2319)  Maintain proper alignment of affected body part:   Support and protect limb and body alignment per provider's orders   Instruct and reinforce with patient and family use of appropriate assistive device and precautions (e.g. spinal

## 2024-07-08 VITALS
WEIGHT: 222.44 LBS | HEART RATE: 50 BPM | HEIGHT: 65 IN | OXYGEN SATURATION: 95 % | BODY MASS INDEX: 37.06 KG/M2 | SYSTOLIC BLOOD PRESSURE: 173 MMHG | TEMPERATURE: 97.4 F | RESPIRATION RATE: 18 BRPM | DIASTOLIC BLOOD PRESSURE: 70 MMHG

## 2024-07-08 PROCEDURE — 6370000000 HC RX 637 (ALT 250 FOR IP)

## 2024-07-08 PROCEDURE — 97116 GAIT TRAINING THERAPY: CPT

## 2024-07-08 PROCEDURE — 97110 THERAPEUTIC EXERCISES: CPT

## 2024-07-08 PROCEDURE — 94761 N-INVAS EAR/PLS OXIMETRY MLT: CPT

## 2024-07-08 PROCEDURE — 6370000000 HC RX 637 (ALT 250 FOR IP): Performed by: STUDENT IN AN ORGANIZED HEALTH CARE EDUCATION/TRAINING PROGRAM

## 2024-07-08 PROCEDURE — 6370000000 HC RX 637 (ALT 250 FOR IP): Performed by: NURSE PRACTITIONER

## 2024-07-08 PROCEDURE — 6370000000 HC RX 637 (ALT 250 FOR IP): Performed by: INTERNAL MEDICINE

## 2024-07-08 RX ORDER — LIDOCAINE 4 G/G
1 PATCH TOPICAL DAILY
DISCHARGE
Start: 2024-07-09

## 2024-07-08 RX ORDER — OXYCODONE HYDROCHLORIDE AND ACETAMINOPHEN 5; 325 MG/1; MG/1
2 TABLET ORAL EVERY 4 HOURS PRN
Qty: 18 TABLET | Refills: 0 | Status: SHIPPED | OUTPATIENT
Start: 2024-07-08 | End: 2024-07-11

## 2024-07-08 RX ADMIN — OXYCODONE HYDROCHLORIDE AND ACETAMINOPHEN 2 TABLET: 5; 325 TABLET ORAL at 01:13

## 2024-07-08 RX ADMIN — SERTRALINE HYDROCHLORIDE 50 MG: 50 TABLET ORAL at 07:45

## 2024-07-08 RX ADMIN — OXYBUTYNIN CHLORIDE 15 MG: 5 TABLET, EXTENDED RELEASE ORAL at 07:46

## 2024-07-08 RX ADMIN — OXYCODONE HYDROCHLORIDE AND ACETAMINOPHEN 2 TABLET: 5; 325 TABLET ORAL at 12:49

## 2024-07-08 RX ADMIN — TIZANIDINE 2 MG: 4 TABLET ORAL at 04:51

## 2024-07-08 RX ADMIN — CHLORTHALIDONE 25 MG: 25 TABLET ORAL at 07:48

## 2024-07-08 RX ADMIN — ATENOLOL 100 MG: 50 TABLET ORAL at 07:46

## 2024-07-08 RX ADMIN — DOCUSATE SODIUM 100 MG: 100 CAPSULE, LIQUID FILLED ORAL at 07:47

## 2024-07-08 RX ADMIN — APIXABAN 2.5 MG: 2.5 TABLET, FILM COATED ORAL at 07:47

## 2024-07-08 RX ADMIN — PREDNISONE 5 MG: 5 TABLET ORAL at 07:47

## 2024-07-08 RX ADMIN — PANTOPRAZOLE SODIUM 40 MG: 40 TABLET, DELAYED RELEASE ORAL at 07:47

## 2024-07-08 RX ADMIN — OXYCODONE HYDROCHLORIDE AND ACETAMINOPHEN 500 MG: 500 TABLET ORAL at 07:47

## 2024-07-08 RX ADMIN — OXYCODONE HYDROCHLORIDE AND ACETAMINOPHEN 2 TABLET: 5; 325 TABLET ORAL at 07:45

## 2024-07-08 ASSESSMENT — PAIN DESCRIPTION - LOCATION
LOCATION: SHOULDER
LOCATION: SHOULDER
LOCATION: LEG

## 2024-07-08 ASSESSMENT — PAIN DESCRIPTION - ORIENTATION
ORIENTATION: LEFT
ORIENTATION: RIGHT
ORIENTATION: LEFT
ORIENTATION: LEFT
ORIENTATION: RIGHT

## 2024-07-08 ASSESSMENT — PAIN DESCRIPTION - ONSET
ONSET: ON-GOING

## 2024-07-08 ASSESSMENT — PAIN DESCRIPTION - FREQUENCY
FREQUENCY: CONTINUOUS

## 2024-07-08 ASSESSMENT — PAIN SCALES - GENERAL
PAINLEVEL_OUTOF10: 7
PAINLEVEL_OUTOF10: 9
PAINLEVEL_OUTOF10: 6
PAINLEVEL_OUTOF10: 7
PAINLEVEL_OUTOF10: 4
PAINLEVEL_OUTOF10: 8
PAINLEVEL_OUTOF10: 6

## 2024-07-08 ASSESSMENT — PAIN - FUNCTIONAL ASSESSMENT
PAIN_FUNCTIONAL_ASSESSMENT: PREVENTS OR INTERFERES SOME ACTIVE ACTIVITIES AND ADLS
PAIN_FUNCTIONAL_ASSESSMENT: ACTIVITIES ARE NOT PREVENTED
PAIN_FUNCTIONAL_ASSESSMENT: PREVENTS OR INTERFERES SOME ACTIVE ACTIVITIES AND ADLS
PAIN_FUNCTIONAL_ASSESSMENT: ACTIVITIES ARE NOT PREVENTED
PAIN_FUNCTIONAL_ASSESSMENT: ACTIVITIES ARE NOT PREVENTED

## 2024-07-08 ASSESSMENT — PAIN DESCRIPTION - DESCRIPTORS
DESCRIPTORS: THROBBING
DESCRIPTORS: THROBBING
DESCRIPTORS: SHARP
DESCRIPTORS: SHARP;SORE

## 2024-07-08 ASSESSMENT — PAIN DESCRIPTION - PAIN TYPE
TYPE: CHRONIC PAIN
TYPE: CHRONIC PAIN

## 2024-07-08 NOTE — PROGRESS NOTES
[facility]  Inpatient/Observation/Outpatient Rehabilitation    Date: 2024  Patient Name: Kaye Joyce       [x] Inpatient Acute/Observation       []  Outpatient  : 1945     Plan of Care/Recert ends    [] Pt no showed for scheduled appointment    [] As a reminder, pt was contacted/attempted contact via phone of upcoming appointments.    [x] Pt refused/declined therapy at this time due to: Pt. declined therapy this afternoon after making 2 attempts. Pt. reported that she is having a hard time with understanding her discharge and when she will be discharged. Pt. stated that she has been doing exercises throughout the day in the chair and that she has been walking to the bathroom constantly. RN made aware.        [] Pt cancelled due to:  [] No Reason Given   [] Sick/ill   [] Other:    [] Evaluation held by RN/Provider due to:    [] High Heart Rate   [] High Blood Pressure   [] Orthopedic Consult   [] Hgb < 7   [] Other:    [] Pt does not require skilled services due to:      Therapist/Assistant will attempt to see this patient, at our earliest opportunity.       Jamilah Ku, PTA Date: 2024

## 2024-07-08 NOTE — PLAN OF CARE
Problem: Discharge Planning  Goal: Discharge to home or other facility with appropriate resources  7/8/2024 1054 by Ofe Noe RN  Outcome: Progressing  Flowsheets (Taken 7/8/2024 1054)  Discharge to home or other facility with appropriate resources:   Identify barriers to discharge with patient and caregiver   Arrange for needed discharge resources and transportation as appropriate     Problem: Pain  Goal: Verbalizes/displays adequate comfort level or baseline comfort level  7/8/2024 1054 by Ofe Noe RN  Outcome: Progressing  Flowsheets (Taken 7/8/2024 1054)  Verbalizes/displays adequate comfort level or baseline comfort level:   Encourage patient to monitor pain and request assistance   Assess pain using appropriate pain scale   Administer analgesics based on type and severity of pain and evaluate response   Implement non-pharmacological measures as appropriate and evaluate response     Problem: Safety - Adult  Goal: Free from fall injury  7/8/2024 1054 by Ofe Noe RN  Outcome: Progressing  Flowsheets (Taken 7/8/2024 1054)  Free From Fall Injury: Instruct family/caregiver on patient safety     Problem: Chronic Conditions and Co-morbidities  Goal: Patient's chronic conditions and co-morbidity symptoms are monitored and maintained or improved  7/8/2024 1054 by Ofe Noe RN  Outcome: Progressing  Flowsheets (Taken 7/8/2024 1054)  Care Plan - Patient's Chronic Conditions and Co-Morbidity Symptoms are Monitored and Maintained or Improved: Monitor and assess patient's chronic conditions and comorbid symptoms for stability, deterioration, or improvement     Problem: Skin/Tissue Integrity  Goal: Absence of new skin breakdown  Description: 1.  Monitor for areas of redness and/or skin breakdown  2.  Assess vascular access sites hourly  3.  Every 4-6 hours minimum:  Change oxygen saturation probe site  4.  Every 4-6 hours:  If on nasal continuous positive airway pressure, respiratory

## 2024-07-08 NOTE — PLAN OF CARE
Problem: Discharge Planning  Goal: Discharge to home or other facility with appropriate resources  Outcome: Progressing     Problem: Pain  Goal: Verbalizes/displays adequate comfort level or baseline comfort level  Outcome: Progressing     Problem: Safety - Adult  Goal: Free from fall injury  Outcome: Progressing  Flowsheets (Taken 7/8/2024 0028)  Free From Fall Injury: Instruct family/caregiver on patient safety     Problem: Chronic Conditions and Co-morbidities  Goal: Patient's chronic conditions and co-morbidity symptoms are monitored and maintained or improved  Outcome: Progressing     Problem: Skin/Tissue Integrity  Goal: Absence of new skin breakdown  Description: 1.  Monitor for areas of redness and/or skin breakdown  2.  Assess vascular access sites hourly  3.  Every 4-6 hours minimum:  Change oxygen saturation probe site  4.  Every 4-6 hours:  If on nasal continuous positive airway pressure, respiratory therapy assess nares and determine need for appliance change or resting period.  Outcome: Progressing     Problem: Nutrition Deficit:  Goal: Optimize nutritional status  Outcome: Progressing     Problem: Musculoskeletal - Adult  Goal: Return mobility to safest level of function  Outcome: Progressing  Goal: Maintain proper alignment of affected body part  Outcome: Progressing

## 2024-07-08 NOTE — PROGRESS NOTES
Physical Therapy  Facility/Department: [facility] MED SURG  Daily Treatment Note  NAME: Kaye Joyce  : 1945  MRN: [id number]    Date of Service: 2024    Discharge Recommendations:  Continue to assess pending progress, Subacute/Skilled Nursing Facility     Patient Diagnosis(es): The primary encounter diagnosis was Urinary tract infection without hematuria, site unspecified. Diagnoses of Hypomagnesemia, Fall, initial encounter, and Closed nondisplaced fracture of surgical neck of right humerus with routine healing, unspecified fracture morphology, subsequent encounter were also pertinent to this visit.    Assessment   Assessment: Transfers:CGA. Pt. sat EOB ~5 minutes with 1 UE support and no LOB noted. Pt. ambulated 17ftx1 wtih SC and CGA/SBA for safety. Pt. continues to report increased pain when ambulating and moving. Pt. completed reclined and seated BLE therex x20 in all available planes of motion.  Activity Tolerance: Patient tolerated treatment well     Plan    Physical Therapy Plan  General Plan: 2 times a day 7 days a week  Specific Instructions for Next Treatment: 1x/ daily on weekends  Current Treatment Recommendations: Strengthening;ROM;Balance training;Functional mobility training;Transfer training;Gait training;Stair training;Manual;Home exercise program;Pain management;Safety education & training;Patient/Caregiver education & training;Equipment evaluation, education, & procurement;Positioning;Therapeutic activities     Restrictions  Restrictions/Precautions  Restrictions/Precautions: Fall Risk, Weight Bearing, General Precautions, Contact Precautions  Required Braces or Orthoses?: Yes  Upper Extremity Weight Bearing Restrictions  Right Upper Extremity Weight Bearing: Non Weight Bearing  Required Braces or Orthoses  Right Upper Extremity Brace/Splint: Right upper extremity in sling- nursing asked about more of an immobilizer brace, therapist informed nursing to check with central supply

## 2024-07-08 NOTE — PROGRESS NOTES
Discharge instructions given to [facility] including MAR and KASSIDY, patient verbalized understanding of plan to transport to [facility]. Patient aware of all follow-up appointments. Report called to [facility] at the [facility], all questions answered, patient denies any needs at this time. Patient d/c off unit via wheelchair with [facility], to the [facility] at [place]. Belongings in hand, no issues noted.

## 2024-07-08 NOTE — CARE COORDINATION
IMM letter provided to patient.  Patient offered four hours to make informed decision regarding appeal process; patient agreeable to discharge.     07/08/24 1506   IMM Letter   IMM Letter given to Patient/Family/Significant other/Guardian/POA/by: third- patient / ESTELLA BA   IMM Letter date given: 07/08/24   IMM Letter time given: 1500     Patient is discharge to the [facility] today.  Discharge paper work done and fax.  The [facility] will provide the transportation.  MEJIA Snyder

## 2024-07-08 NOTE — PROGRESS NOTES
RN in to assess patient. Patient resting in bed, A/Ox4, Respirations Easy and Nonlabored. Patient reports pain to left lower leg, chronic, medicated with PRN order, see MAR. Lung sounds clear throughout. Patient ambulated to bathroom with standby assist. Patient denies any needs or concerns at this time. Sling and sloth in place. +PMS to RUE. Assessment and vital signs completed as charted. Care ongoing.

## 2024-07-08 NOTE — DISCHARGE SUMMARY
Discharge Summary    Kaye Joyce  :  1945  MRN:  [id number]    Admit date:  2024      Discharge date: 2024     Admitting Physician:  Javon Hughes MD    Discharge Diagnoses:    Principal Problem:    Closed nondisplaced fracture of surgical neck of right humerus with routine healing  Active Problems:    Essential hypertension    Iron deficiency anemia    UTI (urinary tract infection)    TALAT (obstructive sleep apnea)    Secondary hypercoagulable state (HCC)  Resolved Problems:    * No resolved hospital problems. *      Hospital Course:   Kaye Joyce is a 78 y.o. female admitted with closed nondisplaced surgical neck fracture of the right humerus.  She presented after a fall at home.  She stated that since having COVID in  she has been weak and fatigued.  She reported upon getting ready to sit on the couch she had fallen between a chair and a stool.  She denied any LOC.  She reported she did hit her head on the floor.  She complained of pain to the right shoulder and was unable to raise her arm.  She does have history of frozen shoulder but had better mobility due to steroid use.  She continued to complain of nonproductive cough since her COVID infection.  She denied any GI or  symptoms.  She denied chest pain or shortness of breath.  During patient's workup CT head C-spine and chest x-ray were all negative.  Her right shoulder x-ray showed advanced arthritis of the glenohumeral joint with no evidence of fracture or dislocation.  Patient's initial WBC count was 13.5 with a mild left shift.  Electrolytes showed a potassium of 3.3 and magnesium of 1.2 and were replaced.  Further workup of a CT of the right shoulder showed an acute nondisplaced nonangulated right proximal humeral metadiaphyseal fracture involving of the surgical neck.  She was evaluated by orthopedics and no surgical intervention was required.  Patient initially had been placed on antibiotics for concern of UTI however urine  as: ASCORBIC ACID               Where to Get Your Medications        You can get these medications from any pharmacy    Bring a paper prescription for each of these medications  oxyCODONE-acetaminophen 5-325 MG per tablet       Information about where to get these medications is not yet available    Ask your nurse or doctor about these medications  docusate 100 MG Caps  ferrous sulfate 325 (65 Fe) MG tablet  lidocaine 4 % external patch  polyethylene glycol 17 g packet  tiZANidine 2 MG tablet         Patient Instructions:   Activity: activity as tolerated  Diet: regular diet  Wound Care: none needed  Other: None    Disposition:   DC to [facility]    Follow up:  Patient will be followed by Robert Gonsales MD in 1-2 weeks    CORE MEASURES on Discharge (if applicable)  ACE/ARB in CHF: NA  Statin in MI: NA  ASA in MI: NA  Statin in CVA: NA  Antiplatelet in CVA: NA    Total time spent on discharge services: 40 minutes    Including the following activities:  Evaluation and Management of patient  Discussion with patient and/or surrogate about current care plan  Coordination with Case Management and/or   Coordination of care with Consultants (if applicable)   Coordination of care with Receiving Facility Physician (if applicable)  Completion of DME forms (if applicable)  Preparation of Discharge Summary  Preparation of Medication Reconciliation  Preparation of Discharge Prescriptions    Signed:  GRIS Valdovinos - CNP, GRIS, NP-C  7/8/2024, 3:02 PM

## 2024-07-08 NOTE — PLAN OF CARE
Problem: Discharge Planning  Goal: Discharge to home or other facility with appropriate resources  7/8/2024 1525 by Ofe Noe RN  Outcome: Completed     Problem: Pain  Goal: Verbalizes/displays adequate comfort level or baseline comfort level  7/8/2024 1525 by Ofe Noe RN  Outcome: Completed     Problem: Safety - Adult  Goal: Free from fall injury  7/8/2024 1525 by Ofe Noe RN  Outcome: Completed     Problem: Chronic Conditions and Co-morbidities  Goal: Patient's chronic conditions and co-morbidity symptoms are monitored and maintained or improved  7/8/2024 1525 by Ofe Noe RN  Outcome: Completed     Problem: Skin/Tissue Integrity  Goal: Absence of new skin breakdown  Description: 1.  Monitor for areas of redness and/or skin breakdown  2.  Assess vascular access sites hourly  3.  Every 4-6 hours minimum:  Change oxygen saturation probe site  4.  Every 4-6 hours:  If on nasal continuous positive airway pressure, respiratory therapy assess nares and determine need for appliance change or resting period.  7/8/2024 1525 by Ofe Noe RN  Outcome: Completed     Problem: Nutrition Deficit:  Goal: Optimize nutritional status  7/8/2024 1525 by Ofe Noe RN  Outcome: Completed     Problem: Musculoskeletal - Adult  Goal: Return mobility to safest level of function  7/8/2024 1525 by Ofe Noe RN  Outcome: Completed     Problem: Musculoskeletal - Adult  Goal: Maintain proper alignment of affected body part  7/8/2024 1525 by Ofe Noe RN  Outcome: Completed     Problem: Skin/Tissue Integrity - Adult  Goal: Skin integrity remains intact  7/8/2024 1525 by Ofe Noe RN  Outcome: Completed     Problem: Infection - Adult  Goal: Absence of infection at discharge  7/8/2024 1525 by Ofe Noe RN  Outcome: Completed

## 2024-07-08 NOTE — PROGRESS NOTES
extremities.  Motor and sensory are grossly intact  SKIN:  No rashes.  No skin lesions.  Ecchymosis to right forearm  -----------------------------------------------------------------    Diagnostic Data:      Complete Blood Count:   No results for input(s): \"WBC\", \"RBC\", \"HGB\", \"HCT\", \"MCV\", \"MCH\", \"MCHC\", \"RDW\", \"PLT\", \"MPV\" in the last 72 hours.       Last 3 Blood Glucose:   No results for input(s): \"GLUCOSE\" in the last 72 hours.       Comprehensive Metabolic Profile:   No results for input(s): \"NA\", \"K\", \"CL\", \"CO2\", \"BUN\", \"CREATININE\", \"GLUCOSE\", \"CALCIUM\", \"LABALBU\", \"BILITOT\", \"ALKPHOS\", \"AST\", \"ALT\" in the last 72 hours.    Invalid input(s): \"PROT\"       Urinalysis:   Lab Results   Component Value Date/Time    NITRU POSITIVE 06/27/2024 05:22 PM    COLORU Yellow 06/27/2024 05:22 PM    PHUR 7.0 06/27/2024 05:22 PM    PHUR 7.5 06/09/2021 01:05 PM    WBCUA 10 TO 20 06/27/2024 05:22 PM    RBCUA 0 TO 2 06/27/2024 05:22 PM    MUCUS NOT REPORTED 06/09/2021 01:05 PM    TRICHOMONAS NOT REPORTED 06/09/2021 01:05 PM    YEAST NOT REPORTED 06/09/2021 01:05 PM    BACTERIA 3+ 06/27/2024 05:22 PM    LEUKOCYTESUR SMALL 06/27/2024 05:22 PM    UROBILINOGEN Normal 06/27/2024 05:22 PM    BILIRUBINUR NEGATIVE 06/27/2024 05:22 PM    GLUCOSEU NEGATIVE 06/27/2024 05:22 PM    KETUA NEGATIVE 06/27/2024 05:22 PM    AMORPHOUS NOT REPORTED 06/09/2021 01:05 PM       HgBA1c:    Lab Results   Component Value Date/Time    LABA1C 5.5 05/02/2014 10:10 AM       Lactic Acid: No results found for: \"LACTA\"     Troponin: No results for input(s): \"TROPONINI\" in the last 72 hours.    CRP:  No results for input(s): \"CRP\" in the last 72 hours.      Radiology/Imaging:  CT KNEE LEFT WO CONTRAST   Preliminary Result   No acute osseous abnormality.      Similar postsurgical findings from medial compartment hemiarthroplasty.  No   evidence of hardware complication.      Lateral soft tissue stranding and skin thickening.  No discrete soft tissue   gas or  advance care plan.    [] Hospice care is currently being provided or has been provided within the   calendar year.    []  I did NOT confirm today the presence of an Advance Care Plan or surrogate   decision maker documented within the patient's medical record.   [DOES NOT SATISFY [facility] PERFORMANCE]    GRIS Valdovinos - CNP , GRIS, NP-C  Hospitalist Medicine        7/8/2024, 11:33 AM

## 2024-07-30 PROBLEM — N39.0 UTI (URINARY TRACT INFECTION): Status: RESOLVED | Noted: 2024-06-27 | Resolved: 2024-07-30

## 2024-09-05 DIAGNOSIS — R53.0 NEOPLASTIC MALIGNANT RELATED FATIGUE: ICD-10-CM

## 2024-09-05 DIAGNOSIS — D50.0 IRON DEFICIENCY ANEMIA DUE TO CHRONIC BLOOD LOSS: Primary | ICD-10-CM

## 2024-09-09 LAB
BASOPHILS ABSOLUTE: 0.04 K/UL (ref 0–0.2)
BASOPHILS RELATIVE PERCENT: 0.6 %
EOSINOPHILS ABSOLUTE: 0.18 K/UL (ref 0–0.5)
EOSINOPHILS RELATIVE PERCENT: 2.9 %
HCT VFR BLD CALC: 43 % (ref 34–45)
HEMOGLOBIN: 13.9 G/DL (ref 11.5–15.5)
IMMATURE GRANS (ABS): 0.03
IMMATURE GRANULOCYTES %: 0.5 %
LYMPHOCYTES ABSOLUTE: 0.77 K/UL (ref 1–4)
LYMPHOCYTES RELATIVE PERCENT: 12.4 %
MCH RBC QN AUTO: 31 PG (ref 25–33)
MCHC RBC AUTO-ENTMCNC: 32.3 G/DL (ref 31–36)
MCV RBC AUTO: 96 FL (ref 80–99)
MONOCYTES ABSOLUTE: 0.66 K/UL (ref 0.2–1)
MONOCYTES RELATIVE PERCENT: 10.7 %
NEUTROPHILS ABSOLUTE: 4.51 K/UL (ref 1.5–7.5)
NEUTROPHILS RELATIVE PERCENT: 72.9 %
PDW BLD-RTO: 12.4 % (ref 11.5–15)
PLATELET # BLD: 257 K/UL (ref 130–400)
PMV BLD AUTO: 10.3 FL (ref 9.3–13)
RBC # BLD: 4.48 M/UL (ref 3.8–5.4)
WBC # BLD: 6.2 K/UL (ref 3.5–11)

## 2024-09-10 LAB
ALBUMIN: 4 G/DL (ref 3.5–5.2)
ALK PHOSPHATASE: 107 U/L (ref 40–142)
ALT SERPL-CCNC: 29 U/L (ref 5–40)
ANION GAP SERPL CALCULATED.3IONS-SCNC: 8 MEQ/L (ref 7–16)
AST SERPL-CCNC: 33 U/L (ref 9–40)
BILIRUB SERPL-MCNC: 0.4 MG/DL
BUN BLDV-MCNC: 17 MG/DL (ref 8–23)
CALCIUM SERPL-MCNC: 10.6 MG/DL (ref 8.5–10.5)
CHLORIDE BLD-SCNC: 100 MEQ/L (ref 95–107)
CO2: 32 MEQ/L (ref 19–31)
CREAT SERPL-MCNC: 0.97 MG/DL (ref 0.6–1.3)
EGFR IF NONAFRICAN AMERICAN: 59 ML/MIN/1.73
FERRITIN: 66 NG/ML (ref 13–200)
GLUCOSE: 118 MG/DL (ref 70–99)
IRON % SATURATION: 31 % (ref 20–50)
IRON: 103 UG/DL (ref 37–145)
POTASSIUM SERPL-SCNC: 3.6 MEQ/L (ref 3.5–5.4)
SODIUM BLD-SCNC: 140 MEQ/L (ref 133–146)
TOTAL IRON BINDING CAPACITY: 328 UG/DL (ref 250–450)
TOTAL PROTEIN: 6.4 G/DL (ref 6.1–8.3)
UNSATURATED IRON BINDING CAPACITY: 225 UG/DL (ref 112–347)

## 2024-09-12 ENCOUNTER — OFFICE VISIT (OUTPATIENT)
Dept: ONCOLOGY | Age: 79
End: 2024-09-12

## 2024-09-12 VITALS
WEIGHT: 220 LBS | HEIGHT: 65 IN | TEMPERATURE: 96.6 F | SYSTOLIC BLOOD PRESSURE: 120 MMHG | RESPIRATION RATE: 18 BRPM | DIASTOLIC BLOOD PRESSURE: 75 MMHG | HEART RATE: 77 BPM | BODY MASS INDEX: 36.65 KG/M2

## 2024-09-12 DIAGNOSIS — N18.2 CHRONIC KIDNEY DISEASE, STAGE 2 (MILD): ICD-10-CM

## 2024-09-12 DIAGNOSIS — D50.0 IRON DEFICIENCY ANEMIA DUE TO CHRONIC BLOOD LOSS: ICD-10-CM

## 2024-09-12 DIAGNOSIS — K31.811 GASTRIC HEMORRHAGE DUE TO GASTRIC ANTRAL VASCULAR ECTASIA (GAVE): ICD-10-CM

## 2024-09-12 DIAGNOSIS — I27.82 CHRONIC PULMONARY EMBOLISM, UNSPECIFIED PULMONARY EMBOLISM TYPE, UNSPECIFIED WHETHER ACUTE COR PULMONALE PRESENT (HCC): ICD-10-CM

## 2024-09-12 DIAGNOSIS — I82.519 CHRONIC DEEP VEIN THROMBOSIS (DVT) OF FEMORAL VEIN, UNSPECIFIED LATERALITY (HCC): Primary | ICD-10-CM

## 2024-09-14 ENCOUNTER — HOSPITAL ENCOUNTER (EMERGENCY)
Age: 79
Discharge: HOME | End: 2024-09-14
Payer: MEDICARE

## 2024-09-14 ENCOUNTER — APPOINTMENT (OUTPATIENT)
Dept: CT IMAGING | Age: 79
End: 2024-09-14
Payer: MEDICARE

## 2024-09-14 VITALS
RESPIRATION RATE: 18 BRPM | WEIGHT: 220 LBS | HEART RATE: 81 BPM | HEIGHT: 65 IN | OXYGEN SATURATION: 92 % | TEMPERATURE: 98.7 F | DIASTOLIC BLOOD PRESSURE: 75 MMHG | SYSTOLIC BLOOD PRESSURE: 140 MMHG | BODY MASS INDEX: 36.65 KG/M2

## 2024-09-14 DIAGNOSIS — S09.90XA CLOSED HEAD INJURY, INITIAL ENCOUNTER: ICD-10-CM
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
## 2024-09-14 PROCEDURE — 72125 CT NECK SPINE W/O DYE: CPT

## 2024-09-14 PROCEDURE — 99284 EMERGENCY DEPT VISIT MOD MDM: CPT

## 2024-09-14 PROCEDURE — 6370000000 HC RX 637 (ALT 250 FOR IP)

## 2024-09-14 PROCEDURE — 70450 CT HEAD/BRAIN W/O DYE: CPT

## 2024-09-14 RX ORDER — CYCLOBENZAPRINE HCL 5 MG
5 TABLET ORAL 2 TIMES DAILY PRN
Qty: 6 TABLET | Refills: 0 | Status: SHIPPED | OUTPATIENT
Start: 2024-09-14 | End: 2024-09-17

## 2024-09-14 RX ORDER — CYCLOBENZAPRINE HCL 10 MG
10 TABLET ORAL ONCE
Status: COMPLETED | OUTPATIENT
Start: 2024-09-14 | End: 2024-09-14

## 2024-09-14 RX ORDER — ACETAMINOPHEN 500 MG
1000 TABLET ORAL ONCE
Status: COMPLETED | OUTPATIENT
Start: 2024-09-14 | End: 2024-09-14

## 2024-09-14 RX ORDER — OXYCODONE HYDROCHLORIDE 5 MG/1
5 TABLET ORAL ONCE
Status: COMPLETED | OUTPATIENT
Start: 2024-09-14 | End: 2024-09-14

## 2024-09-14 RX ADMIN — ACETAMINOPHEN 1000 MG: 500 TABLET ORAL at 05:07

## 2024-09-14 RX ADMIN — OXYCODONE HYDROCHLORIDE 5 MG: 5 TABLET ORAL at 06:56

## 2024-09-14 RX ADMIN — CYCLOBENZAPRINE 10 MG: 10 TABLET, FILM COATED ORAL at 05:07

## 2024-09-14 ASSESSMENT — PAIN SCALES - GENERAL
PAINLEVEL_OUTOF10: 7
PAINLEVEL_OUTOF10: 5

## 2024-09-14 ASSESSMENT — PAIN DESCRIPTION - DESCRIPTORS: DESCRIPTORS: ACHING

## 2024-09-14 ASSESSMENT — PAIN DESCRIPTION - ORIENTATION: ORIENTATION: LEFT

## 2024-09-14 ASSESSMENT — PAIN DESCRIPTION - LOCATION: LOCATION: HIP

## 2024-12-16 ENCOUNTER — HOSPITAL ENCOUNTER (OUTPATIENT)
Age: 79
Setting detail: SPECIMEN
Discharge: HOME | End: 2024-12-16
Payer: MEDICARE

## 2024-12-16 LAB
BACTERIA URNS QL MICRO: ABNORMAL
BILIRUB UR QL STRIP: NEGATIVE
CLARITY UR: ABNORMAL
COLOR UR: YELLOW
EPI CELLS #/AREA URNS HPF: ABNORMAL /HPF (ref 0–25)
GLUCOSE UR STRIP-MCNC: NEGATIVE MG/DL
HGB UR QL STRIP.AUTO: NEGATIVE
KETONES UR STRIP-MCNC: NEGATIVE MG/DL
LEUKOCYTE ESTERASE UR QL STRIP: ABNORMAL
NITRITE UR QL STRIP: POSITIVE
PH UR STRIP: 6.5 [PH] (ref 5–9)
PROT UR STRIP-MCNC: NEGATIVE MG/DL
RBC #/AREA URNS HPF: ABNORMAL /HPF (ref 0–2)
SP GR UR STRIP: 1.01 (ref 1.01–1.02)
UROBILINOGEN UR STRIP-ACNC: NORMAL EU/DL (ref 0–1)
WBC #/AREA URNS HPF: ABNORMAL /HPF (ref 0–5)

## 2024-12-16 PROCEDURE — 81001 URINALYSIS AUTO W/SCOPE: CPT

## 2024-12-16 PROCEDURE — 87086 URINE CULTURE/COLONY COUNT: CPT

## 2024-12-16 PROCEDURE — 87186 SC STD MICRODIL/AGAR DIL: CPT

## 2024-12-16 PROCEDURE — 87077 CULTURE AEROBIC IDENTIFY: CPT

## 2024-12-19 LAB
MICROORGANISM SPEC CULT: ABNORMAL
MICROORGANISM SPEC CULT: ABNORMAL
SPECIMEN DESCRIPTION: ABNORMAL

## 2025-02-10 ENCOUNTER — HOSPITAL ENCOUNTER (OUTPATIENT)
Dept: PREADMISSION TESTING | Age: 80
Discharge: HOME | End: 2025-02-14
Attending: ORTHOPAEDIC SURGERY | Admitting: ORTHOPAEDIC SURGERY
Payer: MEDICARE

## 2025-02-10 ENCOUNTER — TELEPHONE (OUTPATIENT)
Dept: PREADMISSION TESTING | Age: 80
End: 2025-02-10

## 2025-02-10 VITALS
HEIGHT: 60 IN | RESPIRATION RATE: 18 BRPM | BODY MASS INDEX: 41.23 KG/M2 | TEMPERATURE: 97.9 F | OXYGEN SATURATION: 94 % | HEART RATE: 67 BPM | SYSTOLIC BLOOD PRESSURE: 153 MMHG | WEIGHT: 210 LBS | DIASTOLIC BLOOD PRESSURE: 99 MMHG

## 2025-02-10 LAB
25(OH)D3 SERPL-MCNC: 33.4 NG/ML (ref 30–100)
ALBUMIN SERPL-MCNC: 3.8 G/DL (ref 3.5–5.2)
ALBUMIN/GLOB SERPL: 1.4 {RATIO} (ref 1–2.5)
ALP SERPL-CCNC: 91 U/L (ref 35–104)
ALT SERPL-CCNC: 16 U/L (ref 10–35)
ANION GAP SERPL CALCULATED.3IONS-SCNC: 7 MMOL/L (ref 9–16)
AST SERPL-CCNC: 22 U/L (ref 10–35)
BASOPHILS # BLD: 0.03 K/UL (ref 0–0.2)
BASOPHILS NFR BLD: 1 % (ref 0–2)
BILIRUB SERPL-MCNC: 0.2 MG/DL (ref 0–1.2)
BUN SERPL-MCNC: 15 MG/DL (ref 8–23)
BUN/CREAT SERPL: 17 (ref 9–20)
CALCIUM SERPL-MCNC: 10.1 MG/DL (ref 8.6–10.4)
CHLORIDE SERPL-SCNC: 103 MMOL/L (ref 98–107)
CO2 SERPL-SCNC: 31 MMOL/L (ref 20–31)
CREAT SERPL-MCNC: 0.9 MG/DL (ref 0.5–0.9)
EOSINOPHIL # BLD: 0.2 K/UL (ref 0–0.44)
EOSINOPHILS RELATIVE PERCENT: 4 % (ref 1–4)
ERYTHROCYTE [DISTWIDTH] IN BLOOD BY AUTOMATED COUNT: 13.2 % (ref 11.8–14.4)
GFR, ESTIMATED: 64 ML/MIN/1.73M2
GLUCOSE SERPL-MCNC: 99 MG/DL (ref 74–99)
HCT VFR BLD AUTO: 41.7 % (ref 36.3–47.1)
HGB BLD-MCNC: 13.5 G/DL (ref 11.9–15.1)
IMM GRANULOCYTES # BLD AUTO: <0.03 K/UL (ref 0–0.3)
IMM GRANULOCYTES NFR BLD: 0 %
LYMPHOCYTES NFR BLD: 0.97 K/UL (ref 1.1–3.7)
LYMPHOCYTES RELATIVE PERCENT: 18 % (ref 24–43)
MCH RBC QN AUTO: 29 PG (ref 25.2–33.5)
MCHC RBC AUTO-ENTMCNC: 32.4 G/DL (ref 28.4–34.8)
MCV RBC AUTO: 89.7 FL (ref 82.6–102.9)
MONOCYTES NFR BLD: 0.62 K/UL (ref 0.1–1.2)
MONOCYTES NFR BLD: 12 % (ref 3–12)
NEUTROPHILS NFR BLD: 65 % (ref 36–65)
NEUTS SEG NFR BLD: 3.54 K/UL (ref 1.5–8.1)
NRBC BLD-RTO: 0 PER 100 WBC
PLATELET # BLD AUTO: 214 K/UL (ref 138–453)
PMV BLD AUTO: 9.3 FL (ref 8.1–13.5)
POTASSIUM SERPL-SCNC: 3.7 MMOL/L (ref 3.7–5.3)
PROT SERPL-MCNC: 6.6 G/DL (ref 6.6–8.7)
RBC # BLD AUTO: 4.65 M/UL (ref 3.95–5.11)
SODIUM SERPL-SCNC: 141 MMOL/L (ref 136–145)
WBC OTHER # BLD: 5.4 K/UL (ref 3.5–11.3)

## 2025-02-10 PROCEDURE — 93005 ELECTROCARDIOGRAM TRACING: CPT | Performed by: ORTHOPAEDIC SURGERY

## 2025-02-10 PROCEDURE — 82306 VITAMIN D 25 HYDROXY: CPT

## 2025-02-10 PROCEDURE — 36415 COLL VENOUS BLD VENIPUNCTURE: CPT

## 2025-02-10 PROCEDURE — 87641 MR-STAPH DNA AMP PROBE: CPT

## 2025-02-10 PROCEDURE — 85025 COMPLETE CBC W/AUTO DIFF WBC: CPT

## 2025-02-10 PROCEDURE — 80053 COMPREHEN METABOLIC PANEL: CPT

## 2025-02-10 RX ORDER — ACETAMINOPHEN 325 MG/1
650 TABLET ORAL ONCE
OUTPATIENT
Start: 2025-02-10 | End: 2025-02-10

## 2025-02-10 RX ORDER — DULOXETIN HYDROCHLORIDE 30 MG/1
30 CAPSULE, DELAYED RELEASE ORAL DAILY
COMMUNITY

## 2025-02-10 RX ORDER — DIMENHYDRINATE 50 MG
50 TABLET ORAL
OUTPATIENT
Start: 2025-02-10 | End: 2025-02-10

## 2025-02-10 RX ORDER — CEFAZOLIN SODIUM/WATER 2 G/20 ML
2000 SYRINGE (ML) INTRAVENOUS ONCE
OUTPATIENT
Start: 2025-02-10 | End: 2025-02-10

## 2025-02-10 ASSESSMENT — PAIN DESCRIPTION - LOCATION: LOCATION: SHOULDER

## 2025-02-10 ASSESSMENT — PAIN DESCRIPTION - ORIENTATION: ORIENTATION: RIGHT;LEFT

## 2025-02-10 ASSESSMENT — PAIN DESCRIPTION - PAIN TYPE: TYPE: CHRONIC PAIN

## 2025-02-10 ASSESSMENT — PAIN SCALES - GENERAL: PAINLEVEL_OUTOF10: 7

## 2025-02-10 NOTE — PROGRESS NOTES
Patient instructed on the pre-operative, intra-operative, and post-operative process. Patient instructed on NPO status. Medication instructions and pre operative instruction sheet reviewed with the patient. CHG skin prep instructions reviewed with patient.   
Lenses/Dentures day of surgery   [x] Chlorhexidene             PAT Call/Visit Questions  Person Interviewed: Kaye  Relationship to Patient: Patient  Surgery Time Verified: Yes  Surgery Location Verified: Yes  NPO Status Reinforced: Yes  Ride and Caregiver Arranged: Yes  Ride Caregiver Provider: Liliana DICKEY Bottle/Wipes provided with instructions for use: Yes         Patient instructed on the pre-operative, intra-operative, and post-operative process?   Yes  Medication instructions reviewed with patient?  Yes  Pre operative instruction sheet reviewed and given to patient in PAT?  Yes

## 2025-02-10 NOTE — TELEPHONE ENCOUNTER
Patient on 3/4/25, left Shoulder with Dr. Wyatt.Patient had abnormal EKG, please review and advise. She does need medical clearance. Patient c/o sore throat. Encouraged her to f/u with pcp. Thank you

## 2025-02-11 LAB
EKG ATRIAL RATE: 63 BPM
EKG P AXIS: 64 DEGREES
EKG P-R INTERVAL: 278 MS
EKG Q-T INTERVAL: 410 MS
EKG QRS DURATION: 88 MS
EKG QTC CALCULATION (BAZETT): 419 MS
EKG R AXIS: -27 DEGREES
EKG T AXIS: 33 DEGREES
EKG VENTRICULAR RATE: 63 BPM
MRSA, DNA, NASAL: NEGATIVE
SPECIMEN DESCRIPTION: NORMAL

## 2025-02-11 NOTE — TELEPHONE ENCOUNTER
Looking for follow up with PCP regardin respiratory issue. Lets have her get cardiac clearance for the shoulder, please. Thank you

## 2025-02-14 NOTE — TELEPHONE ENCOUNTER
I spoke to Fifi at Dr. Wyatt's office. I updated her on Anesthesia's response. Dr. Wyatt will order a repeat EKG, have the patient complete asap. To compare to June 2024 EKG and Feb 2025 EKG. To see if we need cardiac clearance or not. Or if the February EKG is artifact/tremor induced changes.

## 2025-02-14 NOTE — TELEPHONE ENCOUNTER
I reviewed the patients EKG and I do not see any ST abnormalities. I have a suspicion that the EKG report could be incorrect in it's assessment that there is a \"possible inferior infarct\".    My suggestion would be to have the EKG repeated when the patient goes to see their PCP. If the EKG is consistent with previous EKG's then the patient can be cleared for surgery by the PCP. If the repeat EKG still shows possible inferior ischemia then the best coarse of action is to have the MD. Cabello further assess the patient to see if myocardial ischemia does in fact exist.    I have called MD. Cabello directly in the past when he cannot fit a patient in and many times,  because he knows the patients history, he is able to clear the patient over the phone and put in a quick note. So If the EKG shows myocardial ischemia then we should touch base with [facility] and see if he can clear the patient without seeing them.

## 2025-02-14 NOTE — TELEPHONE ENCOUNTER
Patient see's Dr. Gonsales 2/27/25. Dr. Wyatt's office would like to know if Dr. Gonsales can clear the patient with further cardiac testing. Dr. Cabello's office cannot see patient until 4/1/25

## 2025-02-24 ENCOUNTER — HOSPITAL ENCOUNTER (OUTPATIENT)
Age: 80
Discharge: HOME | End: 2025-02-24
Payer: MEDICARE

## 2025-02-24 PROCEDURE — 93005 ELECTROCARDIOGRAM TRACING: CPT | Performed by: ORTHOPAEDIC SURGERY

## 2025-02-25 LAB
EKG ATRIAL RATE: 65 BPM
EKG P AXIS: 54 DEGREES
EKG P-R INTERVAL: 220 MS
EKG Q-T INTERVAL: 406 MS
EKG QRS DURATION: 96 MS
EKG QTC CALCULATION (BAZETT): 422 MS
EKG R AXIS: -27 DEGREES
EKG T AXIS: 50 DEGREES
EKG VENTRICULAR RATE: 65 BPM

## 2025-02-25 NOTE — TELEPHONE ENCOUNTER
Patient had repeat EKG on 2/24/25 that is located under the cardiology tab. She is scheduled for her left total shoulder on 3/4, but cannot see cardiology until 4/1 (currently has an appt scheduled). Please review new EKG and let me know on cardiac clearance. Thank you!

## 2025-02-25 NOTE — TELEPHONE ENCOUNTER
Fifi at [facility] notified of need for cardiac clearance at this time. Thank you!

## 2025-02-25 NOTE — TELEPHONE ENCOUNTER
New EKG reviewed. The new EKG does look better.    The patient does have HTN and is on BP meds. 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 It does not appear that she has seen Dr. Cabello in the last year or two from what I can see in the chart.    For these reasons, we will reschedule her surgery and await her cardiac clearance appointment on 4/1

## 2025-02-28 ENCOUNTER — HOSPITAL ENCOUNTER (OUTPATIENT)
Age: 80
Setting detail: SPECIMEN
Discharge: HOME | End: 2025-02-28
Payer: MEDICARE

## 2025-02-28 LAB
BACTERIA URNS QL MICRO: ABNORMAL
BILIRUB UR QL STRIP: NEGATIVE
CLARITY UR: CLEAR
COLOR UR: YELLOW
EPI CELLS #/AREA URNS HPF: ABNORMAL /HPF (ref 0–25)
GLUCOSE UR STRIP-MCNC: NEGATIVE MG/DL
HGB UR QL STRIP.AUTO: NEGATIVE
KETONES UR STRIP-MCNC: NEGATIVE MG/DL
LEUKOCYTE ESTERASE UR QL STRIP: NEGATIVE
NITRITE UR QL STRIP: POSITIVE
PH UR STRIP: 7 [PH] (ref 5–9)
PROT UR STRIP-MCNC: NEGATIVE MG/DL
RBC #/AREA URNS HPF: ABNORMAL /HPF (ref 0–2)
SP GR UR STRIP: 1.01 (ref 1.01–1.02)
UROBILINOGEN UR STRIP-ACNC: NORMAL EU/DL (ref 0–1)
WBC #/AREA URNS HPF: ABNORMAL /HPF (ref 0–5)

## 2025-02-28 PROCEDURE — 87086 URINE CULTURE/COLONY COUNT: CPT

## 2025-02-28 PROCEDURE — 81001 URINALYSIS AUTO W/SCOPE: CPT

## 2025-03-02 LAB
MICROORGANISM SPEC CULT: ABNORMAL
MICROORGANISM SPEC CULT: ABNORMAL
SPECIMEN DESCRIPTION: ABNORMAL

## 2025-03-03 LAB
MICROORGANISM SPEC CULT: ABNORMAL
MICROORGANISM SPEC CULT: ABNORMAL
SPECIMEN DESCRIPTION: ABNORMAL

## 2025-03-05 ENCOUNTER — HOSPITAL ENCOUNTER (OUTPATIENT)
Age: 80
Discharge: HOME | End: 2025-03-05
Payer: MEDICARE

## 2025-03-05 ENCOUNTER — HOSPITAL ENCOUNTER (OUTPATIENT)
Dept: GENERAL RADIOLOGY | Age: 80
Discharge: HOME | End: 2025-03-07
Payer: MEDICARE

## 2025-03-05 ENCOUNTER — HOSPITAL ENCOUNTER (OUTPATIENT)
Age: 80
Discharge: HOME | End: 2025-03-07
Payer: MEDICARE

## 2025-03-05 ENCOUNTER — OFFICE VISIT (OUTPATIENT)
Dept: CARDIOLOGY | Age: 80
End: 2025-03-05
Payer: MEDICARE

## 2025-03-05 VITALS
HEART RATE: 77 BPM | DIASTOLIC BLOOD PRESSURE: 89 MMHG | SYSTOLIC BLOOD PRESSURE: 140 MMHG | BODY MASS INDEX: 42.8 KG/M2 | OXYGEN SATURATION: 93 % | WEIGHT: 218 LBS | HEIGHT: 60 IN | RESPIRATION RATE: 18 BRPM

## 2025-03-05 DIAGNOSIS — I10 ESSENTIAL HYPERTENSION: ICD-10-CM

## 2025-03-05 DIAGNOSIS — R06.02 SOB (SHORTNESS OF BREATH): ICD-10-CM

## 2025-03-05 DIAGNOSIS — R94.31 ABNORMAL ECG: ICD-10-CM

## 2025-03-05 DIAGNOSIS — Z01.818 PREOPERATIVE CLEARANCE: ICD-10-CM

## 2025-03-05 DIAGNOSIS — I82.409 DEEP VEIN THROMBOSIS (DVT) OF LOWER EXTREMITY, UNSPECIFIED CHRONICITY, UNSPECIFIED LATERALITY, UNSPECIFIED VEIN (HCC): ICD-10-CM

## 2025-03-05 DIAGNOSIS — Z01.818 PREOPERATIVE CLEARANCE: Primary | ICD-10-CM

## 2025-03-05 LAB — BNP SERPL-MCNC: 213 PG/ML (ref 0–450)

## 2025-03-05 PROCEDURE — 83880 ASSAY OF NATRIURETIC PEPTIDE: CPT

## 2025-03-05 PROCEDURE — 36415 COLL VENOUS BLD VENIPUNCTURE: CPT

## 2025-03-05 PROCEDURE — 71046 X-RAY EXAM CHEST 2 VIEWS: CPT

## 2025-03-05 RX ORDER — POLYETHYLENE GLYCOL 400 2.5 MG/ML
SOLUTION/ DROPS OPHTHALMIC
COMMUNITY

## 2025-03-05 NOTE — PROGRESS NOTES
Comment: stopped using medical marijuana        CURRENT MEDICATIONS:  Prior to Admission medications    Medication Sig Start Date End Date Taking? Authorizing Provider   Polyethylene Glycol 400 (BLINK TEARS) 0.25 % GEL Apply to eye   Yes Darrell Holguin MD   DULoxetine (CYMBALTA) 30 MG extended release capsule Take 1 capsule by mouth daily   Yes Darrell Holguin MD   ferrous sulfate (IRON 325) 325 (65 Fe) MG tablet Take 1 tablet by mouth every other day 7/3/24  Yes Nicki Batista APRN - CNP   ELIQUIS 2.5 MG TABS tablet TAKE 1 TABLET TWICE A DAY 1/11/24  Yes Marek Palma MD   vitamin C (ASCORBIC ACID) 500 MG tablet Take 1 tablet by mouth daily   Yes Darrell Holguin MD   Glycerin-Polysorbate 80 (REFRESH DRY EYE THERAPY OP) Apply 1 drop to eye as needed   Yes Darrell Holguin MD   omeprazole (PRILOSEC) 20 MG capsule Take 1 capsule by mouth in the morning and at bedtime   Yes Darrell Holguin MD   atenolol-chlorthalidone (TENORETIC) 100-25 MG per tablet Take 1 tablet by mouth daily   Yes Darrell Holguin MD   Omega-3 Fatty Acids (FISH OIL) 1200 MG CAPS Take 1 tablet by mouth.   Yes Darrell Holguin MD   docusate sodium (COLACE, DULCOLAX) 100 MG CAPS Take 100 mg by mouth 2 times daily  Patient not taking: Reported on 9/12/2024 7/2/24   Nicki Batista APRN - CNP   oxybutynin (DITROPAN XL) 15 MG extended release tablet Take 1 tablet by mouth daily  Patient not taking: Reported on 3/5/2025 2/1/22   Alejandra Hernandez, DO   Multiple Vitamins-Minerals (DRY EYE FORMULA PO)     Darrell Holguin MD   calcium carbonate 600 MG TABS tablet Take 1 tablet by mouth in the morning and 1 tablet in the evening.  Patient not taking: Reported on 3/5/2025    Darrell Holguin MD               FAMILY HISTORY: family history includes Colon Cancer in her nephew; Deep Vein Thrombosis in her mother.     PHYSICAL EXAM:   BP (!) 140/89 (Site: Right Upper Arm,

## 2025-03-19 ENCOUNTER — HOSPITAL ENCOUNTER (OUTPATIENT)
Age: 80
Discharge: HOME | End: 2025-03-21
Attending: INTERNAL MEDICINE
Payer: MEDICARE

## 2025-03-19 VITALS
WEIGHT: 218.03 LBS | HEIGHT: 60 IN | DIASTOLIC BLOOD PRESSURE: 89 MMHG | BODY MASS INDEX: 42.81 KG/M2 | SYSTOLIC BLOOD PRESSURE: 140 MMHG

## 2025-03-19 DIAGNOSIS — Z01.818 PREOPERATIVE CLEARANCE: ICD-10-CM

## 2025-03-19 DIAGNOSIS — I10 ESSENTIAL HYPERTENSION: ICD-10-CM

## 2025-03-19 DIAGNOSIS — R94.31 ABNORMAL ECG: ICD-10-CM

## 2025-03-19 DIAGNOSIS — I82.409 DEEP VEIN THROMBOSIS (DVT) OF LOWER EXTREMITY, UNSPECIFIED CHRONICITY, UNSPECIFIED LATERALITY, UNSPECIFIED VEIN: ICD-10-CM

## 2025-03-19 DIAGNOSIS — R06.02 SOB (SHORTNESS OF BREATH): ICD-10-CM

## 2025-03-19 LAB
ECHO AO SINUS VALSALVA DIAM: 3.6 CM
ECHO AO SINUS VALSALVA INDEX: 1.86 CM/M2
ECHO AO ST JNCT DIAM: 2.8 CM
ECHO AR MAX VEL PISA: 4.7 M/S
ECHO AV CUSP MM: 2 CM
ECHO AV MEAN GRADIENT: 5 MMHG
ECHO AV MEAN VELOCITY: 1.1 M/S
ECHO AV PEAK GRADIENT: 9 MMHG
ECHO AV PEAK VELOCITY: 1.5 M/S
ECHO AV REGURGITANT PHT: 509 MS
ECHO AV VELOCITY RATIO: 0.73
ECHO AV VTI: 31.8 CM
ECHO BSA: 2.05 M2
ECHO EST RA PRESSURE: 3 MMHG
ECHO LA AREA 2C: 15 CM2
ECHO LA AREA 4C: 16 CM2
ECHO LA MAJOR AXIS: 5.3 CM
ECHO LA MINOR AXIS: 5.8 CM
ECHO LA VOL BP: 38 ML (ref 22–52)
ECHO LA VOL MOD A2C: 34 ML (ref 22–52)
ECHO LA VOL MOD A4C: 40 ML (ref 22–52)
ECHO LA VOL/BSA BIPLANE: 20 ML/M2 (ref 16–34)
ECHO LA VOLUME INDEX MOD A2C: 18 ML/M2 (ref 16–34)
ECHO LA VOLUME INDEX MOD A4C: 21 ML/M2 (ref 16–34)
ECHO LV E' LATERAL VELOCITY: 9.46 CM/S
ECHO LV EDV A2C: 30 ML
ECHO LV EDV A4C: 43 ML
ECHO LV EDV INDEX A4C: 22 ML/M2
ECHO LV EDV NDEX A2C: 15 ML/M2
ECHO LV EF PHYSICIAN: 55 %
ECHO LV EJECTION FRACTION A2C: 47 %
ECHO LV EJECTION FRACTION A4C: 62 %
ECHO LV EJECTION FRACTION BIPLANE: 55 % (ref 55–100)
ECHO LV ESV A2C: 16 ML
ECHO LV ESV A4C: 17 ML
ECHO LV ESV INDEX A2C: 8 ML/M2
ECHO LV ESV INDEX A4C: 9 ML/M2
ECHO LV FRACTIONAL SHORTENING: 32 % (ref 28–44)
ECHO LV INTERNAL DIMENSION DIASTOLE INDEX: 2.11 CM/M2
ECHO LV INTERNAL DIMENSION DIASTOLIC: 4.1 CM (ref 3.9–5.3)
ECHO LV INTERNAL DIMENSION SYSTOLIC INDEX: 1.44 CM/M2
ECHO LV INTERNAL DIMENSION SYSTOLIC: 2.8 CM
ECHO LV IVSD: 1.4 CM (ref 0.6–0.9)
ECHO LV MASS 2D: 193.5 G (ref 67–162)
ECHO LV MASS INDEX 2D: 99.7 G/M2 (ref 43–95)
ECHO LV POSTERIOR WALL DIASTOLIC: 1.2 CM (ref 0.6–0.9)
ECHO LV RELATIVE WALL THICKNESS RATIO: 0.59
ECHO LVOT AV VTI INDEX: 0.71
ECHO LVOT MEAN GRADIENT: 3 MMHG
ECHO LVOT PEAK GRADIENT: 5 MMHG
ECHO LVOT PEAK VELOCITY: 1.1 M/S
ECHO LVOT VTI: 22.7 CM
ECHO MV A VELOCITY: 1.07 M/S
ECHO MV E DECELERATION TIME (DT): 267 MS
ECHO MV E VELOCITY: 0.46 M/S
ECHO MV E/A RATIO: 0.43
ECHO MV E/E' LATERAL: 4.86
ECHO PV MAX VELOCITY: 0.9 M/S
ECHO PV PEAK GRADIENT: 3 MMHG
ECHO RIGHT VENTRICULAR SYSTOLIC PRESSURE (RVSP): 25 MMHG
ECHO RV TAPSE: 2.2 CM (ref 1.7–?)
ECHO TV REGURGITANT MAX VELOCITY: 2.35 M/S
ECHO TV REGURGITANT PEAK GRADIENT: 22 MMHG

## 2025-03-19 PROCEDURE — 93306 TTE W/DOPPLER COMPLETE: CPT

## 2025-03-21 ENCOUNTER — TELEPHONE (OUTPATIENT)
Dept: CARDIOLOGY | Age: 80
End: 2025-03-21

## 2025-03-21 NOTE — TELEPHONE ENCOUNTER
Pt was seen on 3/7/25 for a cardiac clearance. She had echo done on 3/19/25.  Please indicate if pt is cleared for surgery.    Fax clearance to [phone]

## 2025-03-24 ENCOUNTER — TELEPHONE (OUTPATIENT)
Dept: CARDIOLOGY | Age: 80
End: 2025-03-24

## 2025-03-25 NOTE — TELEPHONE ENCOUNTER
Please let patient know echocardiogram was ok, did show mild aortic regurgitation (mild leaky valve). Typically monitoring this with echocardiogram every 2-3 years is recommended. Thank you

## 2025-03-27 ENCOUNTER — RESULTS FOLLOW-UP (OUTPATIENT)
Dept: CARDIOLOGY | Age: 80
End: 2025-03-27

## 2025-03-28 NOTE — CARE COORDINATION
Sent a referral to the [facility] per the patient's request to the [facility] for her follow up care after surgery.  The patient is coming inn on 4/1 for a total shoulder surgery.  MEJIA Snyder

## 2025-03-31 ENCOUNTER — ANESTHESIA EVENT (OUTPATIENT)
Dept: OPERATING ROOM | Age: 80
End: 2025-03-31
Payer: MEDICARE

## 2025-04-01 ENCOUNTER — ANESTHESIA (OUTPATIENT)
Dept: OPERATING ROOM | Age: 80
End: 2025-04-01
Payer: MEDICARE

## 2025-04-01 ENCOUNTER — HOSPITAL ENCOUNTER (OUTPATIENT)
Age: 80
Setting detail: OBSERVATION
Discharge: NURSING, REHAB OR LONG-TERM CARE | End: 2025-04-03
Attending: ORTHOPAEDIC SURGERY | Admitting: ORTHOPAEDIC SURGERY
Payer: MEDICARE

## 2025-04-01 ENCOUNTER — APPOINTMENT (OUTPATIENT)
Dept: GENERAL RADIOLOGY | Age: 80
End: 2025-04-01
Attending: ORTHOPAEDIC SURGERY
Payer: MEDICARE

## 2025-04-01 DIAGNOSIS — G89.18 POST-OP PAIN: Primary | ICD-10-CM

## 2025-04-01 PROBLEM — M19.012 LOCALIZED PRIMARY OSTEOARTHRITIS OF LEFT SHOULDER REGION: Status: ACTIVE | Noted: 2025-04-01

## 2025-04-01 PROCEDURE — C1776 JOINT DEVICE (IMPLANTABLE): HCPCS | Performed by: ORTHOPAEDIC SURGERY

## 2025-04-01 PROCEDURE — 2709999900 HC NON-CHARGEABLE SUPPLY: Performed by: ORTHOPAEDIC SURGERY

## 2025-04-01 PROCEDURE — 7100000001 HC PACU RECOVERY - ADDTL 15 MIN: Performed by: ORTHOPAEDIC SURGERY

## 2025-04-01 PROCEDURE — 94150 VITAL CAPACITY TEST: CPT

## 2025-04-01 PROCEDURE — 97161 PT EVAL LOW COMPLEX 20 MIN: CPT

## 2025-04-01 PROCEDURE — 6370000000 HC RX 637 (ALT 250 FOR IP): Performed by: ORTHOPAEDIC SURGERY

## 2025-04-01 PROCEDURE — 2720000010 HC SURG SUPPLY STERILE: Performed by: ORTHOPAEDIC SURGERY

## 2025-04-01 PROCEDURE — 2500000003 HC RX 250 WO HCPCS

## 2025-04-01 PROCEDURE — 7100000000 HC PACU RECOVERY - FIRST 15 MIN: Performed by: ORTHOPAEDIC SURGERY

## 2025-04-01 PROCEDURE — 6360000002 HC RX W HCPCS: Performed by: ORTHOPAEDIC SURGERY

## 2025-04-01 PROCEDURE — 73020 X-RAY EXAM OF SHOULDER: CPT

## 2025-04-01 PROCEDURE — 3600000014 HC SURGERY LEVEL 4 ADDTL 15MIN: Performed by: ORTHOPAEDIC SURGERY

## 2025-04-01 PROCEDURE — 97166 OT EVAL MOD COMPLEX 45 MIN: CPT

## 2025-04-01 PROCEDURE — 94761 N-INVAS EAR/PLS OXIMETRY MLT: CPT

## 2025-04-01 PROCEDURE — 3600000004 HC SURGERY LEVEL 4 BASE: Performed by: ORTHOPAEDIC SURGERY

## 2025-04-01 PROCEDURE — 2700000000 HC OXYGEN THERAPY PER DAY

## 2025-04-01 PROCEDURE — 3700000001 HC ADD 15 MINUTES (ANESTHESIA): Performed by: ORTHOPAEDIC SURGERY

## 2025-04-01 PROCEDURE — 6360000002 HC RX W HCPCS

## 2025-04-01 PROCEDURE — 64415 NJX AA&/STRD BRCH PLXS IMG: CPT

## 2025-04-01 PROCEDURE — 3700000000 HC ANESTHESIA ATTENDED CARE: Performed by: ORTHOPAEDIC SURGERY

## 2025-04-01 PROCEDURE — 2580000003 HC RX 258: Performed by: ORTHOPAEDIC SURGERY

## 2025-04-01 PROCEDURE — 2500000003 HC RX 250 WO HCPCS: Performed by: ORTHOPAEDIC SURGERY

## 2025-04-01 PROCEDURE — G0378 HOSPITAL OBSERVATION PER HR: HCPCS

## 2025-04-01 PROCEDURE — 94664 DEMO&/EVAL PT USE INHALER: CPT

## 2025-04-01 PROCEDURE — 2580000003 HC RX 258: Performed by: NURSE ANESTHETIST, CERTIFIED REGISTERED

## 2025-04-01 DEVICE — SCREW PERIPHERAL 38MM: Type: IMPLANTABLE DEVICE | Site: SHOULDER | Status: FUNCTIONAL

## 2025-04-01 DEVICE — IMPLANTABLE DEVICE: Type: IMPLANTABLE DEVICE | Site: SHOULDER | Status: FUNCTIONAL

## 2025-04-01 DEVICE — SCREW BONE L34MM DIA5MM TI ST FULL THRD PERIPH FOR GLEN: Type: IMPLANTABLE DEVICE | Site: SHOULDER | Status: FUNCTIONAL

## 2025-04-01 DEVICE — SCREW BONE L35MM DIA6.5MM TI ST FULL THRD CTRL FOR GLEN: Type: IMPLANTABLE DEVICE | Site: SHOULDER | Status: FUNCTIONAL

## 2025-04-01 DEVICE — BASEPLATE GLEN OD25MM 15DEG HALF WDG AUG REVERSED AEQUALIS: Type: IMPLANTABLE DEVICE | Site: SHOULDER | Status: FUNCTIONAL

## 2025-04-01 RX ORDER — OXYCODONE HYDROCHLORIDE 5 MG/1
5 TABLET ORAL EVERY 4 HOURS PRN
Status: DISCONTINUED | OUTPATIENT
Start: 2025-04-01 | End: 2025-04-03 | Stop reason: HOSPADM

## 2025-04-01 RX ORDER — NALOXONE HYDROCHLORIDE 0.4 MG/ML
INJECTION, SOLUTION INTRAMUSCULAR; INTRAVENOUS; SUBCUTANEOUS PRN
Status: DISCONTINUED | OUTPATIENT
Start: 2025-04-01 | End: 2025-04-01 | Stop reason: HOSPADM

## 2025-04-01 RX ORDER — OXYBUTYNIN CHLORIDE 5 MG/1
15 TABLET, EXTENDED RELEASE ORAL DAILY
Status: DISCONTINUED | OUTPATIENT
Start: 2025-04-01 | End: 2025-04-01

## 2025-04-01 RX ORDER — LIDOCAINE HYDROCHLORIDE 20 MG/ML
INJECTION, SOLUTION EPIDURAL; INFILTRATION; INTRACAUDAL; PERINEURAL
Status: DISCONTINUED | OUTPATIENT
Start: 2025-04-01 | End: 2025-04-01 | Stop reason: SDUPTHER

## 2025-04-01 RX ORDER — ONDANSETRON 2 MG/ML
4 INJECTION INTRAMUSCULAR; INTRAVENOUS
Status: DISCONTINUED | OUTPATIENT
Start: 2025-04-01 | End: 2025-04-01 | Stop reason: HOSPADM

## 2025-04-01 RX ORDER — DEXMEDETOMIDINE HYDROCHLORIDE 4 UG/ML
INJECTION, SOLUTION INTRAVENOUS
Status: DISCONTINUED | OUTPATIENT
Start: 2025-04-01 | End: 2025-04-01

## 2025-04-01 RX ORDER — SENNA AND DOCUSATE SODIUM 50; 8.6 MG/1; MG/1
1 TABLET, FILM COATED ORAL 2 TIMES DAILY
Status: DISCONTINUED | OUTPATIENT
Start: 2025-04-01 | End: 2025-04-03 | Stop reason: HOSPADM

## 2025-04-01 RX ORDER — DEXMEDETOMIDINE HYDROCHLORIDE 4 UG/ML
INJECTION, SOLUTION INTRAVENOUS
Status: DISCONTINUED | OUTPATIENT
Start: 2025-04-01 | End: 2025-04-01 | Stop reason: SDUPTHER

## 2025-04-01 RX ORDER — DIMENHYDRINATE 50 MG
50 TABLET ORAL ONCE
Status: DISCONTINUED | OUTPATIENT
Start: 2025-04-01 | End: 2025-04-01 | Stop reason: SDUPTHER

## 2025-04-01 RX ORDER — FERROUS SULFATE 325(65) MG
325 TABLET ORAL EVERY OTHER DAY
Status: DISCONTINUED | OUTPATIENT
Start: 2025-04-01 | End: 2025-04-03 | Stop reason: HOSPADM

## 2025-04-01 RX ORDER — LABETALOL HYDROCHLORIDE 5 MG/ML
INJECTION, SOLUTION INTRAVENOUS
Status: DISCONTINUED | OUTPATIENT
Start: 2025-04-01 | End: 2025-04-01 | Stop reason: SDUPTHER

## 2025-04-01 RX ORDER — CEFAZOLIN SODIUM/WATER 2 G/20 ML
2000 SYRINGE (ML) INTRAVENOUS EVERY 8 HOURS
Status: COMPLETED | OUTPATIENT
Start: 2025-04-01 | End: 2025-04-02

## 2025-04-01 RX ORDER — ATENOLOL 50 MG/1
50 TABLET ORAL DAILY
COMMUNITY
Start: 2025-03-19

## 2025-04-01 RX ORDER — ONDANSETRON 2 MG/ML
4 INJECTION INTRAMUSCULAR; INTRAVENOUS EVERY 6 HOURS PRN
Status: DISCONTINUED | OUTPATIENT
Start: 2025-04-01 | End: 2025-04-03 | Stop reason: HOSPADM

## 2025-04-01 RX ORDER — FENTANYL CITRATE 50 UG/ML
INJECTION, SOLUTION INTRAMUSCULAR; INTRAVENOUS
Status: DISCONTINUED | OUTPATIENT
Start: 2025-04-01 | End: 2025-04-01 | Stop reason: SDUPTHER

## 2025-04-01 RX ORDER — SODIUM CHLORIDE 9 MG/ML
INJECTION, SOLUTION INTRAVENOUS CONTINUOUS
Status: DISCONTINUED | OUTPATIENT
Start: 2025-04-01 | End: 2025-04-02

## 2025-04-01 RX ORDER — ALBUTEROL SULFATE 0.83 MG/ML
2.5 SOLUTION RESPIRATORY (INHALATION) ONCE
Status: COMPLETED | OUTPATIENT
Start: 2025-04-01 | End: 2025-04-01

## 2025-04-01 RX ORDER — ACETAMINOPHEN 325 MG/1
650 TABLET ORAL ONCE
Status: COMPLETED | OUTPATIENT
Start: 2025-04-01 | End: 2025-04-01

## 2025-04-01 RX ORDER — SODIUM CHLORIDE 0.9 % (FLUSH) 0.9 %
5-40 SYRINGE (ML) INJECTION EVERY 12 HOURS SCHEDULED
Status: DISCONTINUED | OUTPATIENT
Start: 2025-04-01 | End: 2025-04-03 | Stop reason: HOSPADM

## 2025-04-01 RX ORDER — ATENOLOL AND CHLORTHALIDONE TABLET 100; 25 MG/1; MG/1
1 TABLET ORAL DAILY
Status: DISCONTINUED | OUTPATIENT
Start: 2025-04-01 | End: 2025-04-01

## 2025-04-01 RX ORDER — CEFAZOLIN SODIUM/WATER 2 G/20 ML
2000 SYRINGE (ML) INTRAVENOUS ONCE
Status: COMPLETED | OUTPATIENT
Start: 2025-04-01 | End: 2025-04-01

## 2025-04-01 RX ORDER — ONDANSETRON 2 MG/ML
INJECTION INTRAMUSCULAR; INTRAVENOUS
Status: DISCONTINUED | OUTPATIENT
Start: 2025-04-01 | End: 2025-04-01 | Stop reason: SDUPTHER

## 2025-04-01 RX ORDER — SODIUM CHLORIDE, SODIUM LACTATE, POTASSIUM CHLORIDE, CALCIUM CHLORIDE 600; 310; 30; 20 MG/100ML; MG/100ML; MG/100ML; MG/100ML
INJECTION, SOLUTION INTRAVENOUS CONTINUOUS
Status: DISCONTINUED | OUTPATIENT
Start: 2025-04-01 | End: 2025-04-01

## 2025-04-01 RX ORDER — CELECOXIB 100 MG/1
100 CAPSULE ORAL 2 TIMES DAILY
Status: COMPLETED | OUTPATIENT
Start: 2025-04-01 | End: 2025-04-02

## 2025-04-01 RX ORDER — SODIUM CHLORIDE 0.9 % (FLUSH) 0.9 %
5-40 SYRINGE (ML) INJECTION PRN
Status: DISCONTINUED | OUTPATIENT
Start: 2025-04-01 | End: 2025-04-01 | Stop reason: HOSPADM

## 2025-04-01 RX ORDER — DIMENHYDRINATE 50 MG
50 TABLET ORAL ONCE
Status: COMPLETED | OUTPATIENT
Start: 2025-04-01 | End: 2025-04-01

## 2025-04-01 RX ORDER — ROPIVACAINE HYDROCHLORIDE 5 MG/ML
INJECTION, SOLUTION EPIDURAL; INFILTRATION; PERINEURAL
Status: DISCONTINUED | OUTPATIENT
Start: 2025-04-01 | End: 2025-04-01 | Stop reason: SDUPTHER

## 2025-04-01 RX ORDER — SODIUM CHLORIDE 9 MG/ML
INJECTION, SOLUTION INTRAVENOUS PRN
Status: DISCONTINUED | OUTPATIENT
Start: 2025-04-01 | End: 2025-04-01 | Stop reason: HOSPADM

## 2025-04-01 RX ORDER — VANCOMYCIN 1.5 G/300ML
15 INJECTION, SOLUTION INTRAVENOUS ONCE
Status: COMPLETED | OUTPATIENT
Start: 2025-04-01 | End: 2025-04-01

## 2025-04-01 RX ORDER — PROPOFOL 10 MG/ML
INJECTION, EMULSION INTRAVENOUS
Status: DISCONTINUED | OUTPATIENT
Start: 2025-04-01 | End: 2025-04-01 | Stop reason: SDUPTHER

## 2025-04-01 RX ORDER — ACETAMINOPHEN 325 MG/1
650 TABLET ORAL EVERY 6 HOURS
Status: DISCONTINUED | OUTPATIENT
Start: 2025-04-01 | End: 2025-04-03 | Stop reason: HOSPADM

## 2025-04-01 RX ORDER — SODIUM CHLORIDE 9 MG/ML
INJECTION, SOLUTION INTRAVENOUS PRN
Status: DISCONTINUED | OUTPATIENT
Start: 2025-04-01 | End: 2025-04-03 | Stop reason: HOSPADM

## 2025-04-01 RX ORDER — DEXAMETHASONE SODIUM PHOSPHATE 10 MG/ML
INJECTION, SOLUTION INTRA-ARTICULAR; INTRALESIONAL; INTRAMUSCULAR; INTRAVENOUS; SOFT TISSUE
Status: DISCONTINUED | OUTPATIENT
Start: 2025-04-01 | End: 2025-04-01 | Stop reason: SDUPTHER

## 2025-04-01 RX ORDER — DEXAMETHASONE SODIUM PHOSPHATE 4 MG/ML
INJECTION, SOLUTION INTRA-ARTICULAR; INTRALESIONAL; INTRAMUSCULAR; INTRAVENOUS; SOFT TISSUE
Status: DISCONTINUED | OUTPATIENT
Start: 2025-04-01 | End: 2025-04-01 | Stop reason: SDUPTHER

## 2025-04-01 RX ORDER — SODIUM CHLORIDE 0.9 % (FLUSH) 0.9 %
5-40 SYRINGE (ML) INJECTION EVERY 12 HOURS SCHEDULED
Status: DISCONTINUED | OUTPATIENT
Start: 2025-04-01 | End: 2025-04-01 | Stop reason: HOSPADM

## 2025-04-01 RX ORDER — MORPHINE SULFATE 4 MG/ML
4 INJECTION, SOLUTION INTRAMUSCULAR; INTRAVENOUS
Status: DISCONTINUED | OUTPATIENT
Start: 2025-04-01 | End: 2025-04-03

## 2025-04-01 RX ORDER — METOCLOPRAMIDE HYDROCHLORIDE 5 MG/ML
10 INJECTION INTRAMUSCULAR; INTRAVENOUS
Status: DISCONTINUED | OUTPATIENT
Start: 2025-04-01 | End: 2025-04-01 | Stop reason: HOSPADM

## 2025-04-01 RX ORDER — DULOXETIN HYDROCHLORIDE 30 MG/1
30 CAPSULE, DELAYED RELEASE ORAL DAILY
Status: DISCONTINUED | OUTPATIENT
Start: 2025-04-02 | End: 2025-04-03 | Stop reason: HOSPADM

## 2025-04-01 RX ORDER — ATENOLOL 50 MG/1
100 TABLET ORAL DAILY
Status: DISCONTINUED | OUTPATIENT
Start: 2025-04-01 | End: 2025-04-01

## 2025-04-01 RX ORDER — GLYCOPYRROLATE 0.2 MG/ML
INJECTION INTRAMUSCULAR; INTRAVENOUS
Status: DISCONTINUED | OUTPATIENT
Start: 2025-04-01 | End: 2025-04-01 | Stop reason: SDUPTHER

## 2025-04-01 RX ORDER — SODIUM CHLORIDE 0.9 % (FLUSH) 0.9 %
5-40 SYRINGE (ML) INJECTION PRN
Status: DISCONTINUED | OUTPATIENT
Start: 2025-04-01 | End: 2025-04-03 | Stop reason: HOSPADM

## 2025-04-01 RX ORDER — ROCURONIUM BROMIDE 10 MG/ML
INJECTION, SOLUTION INTRAVENOUS
Status: DISCONTINUED | OUTPATIENT
Start: 2025-04-01 | End: 2025-04-01 | Stop reason: SDUPTHER

## 2025-04-01 RX ORDER — MORPHINE SULFATE 2 MG/ML
2 INJECTION, SOLUTION INTRAMUSCULAR; INTRAVENOUS
Status: DISCONTINUED | OUTPATIENT
Start: 2025-04-01 | End: 2025-04-03

## 2025-04-01 RX ORDER — ACETAMINOPHEN 325 MG/1
650 TABLET ORAL ONCE
Status: DISCONTINUED | OUTPATIENT
Start: 2025-04-01 | End: 2025-04-01 | Stop reason: SDUPTHER

## 2025-04-01 RX ORDER — CHLORTHALIDONE 25 MG/1
25 TABLET ORAL DAILY
COMMUNITY
Start: 2025-03-19

## 2025-04-01 RX ORDER — ATENOLOL 50 MG/1
50 TABLET ORAL DAILY
Status: DISCONTINUED | OUTPATIENT
Start: 2025-04-01 | End: 2025-04-03 | Stop reason: HOSPADM

## 2025-04-01 RX ORDER — PANTOPRAZOLE SODIUM 40 MG/1
40 TABLET, DELAYED RELEASE ORAL
Status: DISCONTINUED | OUTPATIENT
Start: 2025-04-02 | End: 2025-04-03 | Stop reason: HOSPADM

## 2025-04-01 RX ORDER — ONDANSETRON 4 MG/1
4 TABLET, ORALLY DISINTEGRATING ORAL EVERY 8 HOURS PRN
Status: DISCONTINUED | OUTPATIENT
Start: 2025-04-01 | End: 2025-04-03 | Stop reason: HOSPADM

## 2025-04-01 RX ORDER — CHLORTHALIDONE 25 MG/1
25 TABLET ORAL DAILY
Status: DISCONTINUED | OUTPATIENT
Start: 2025-04-01 | End: 2025-04-01

## 2025-04-01 RX ORDER — EPHEDRINE SULFATE/0.9% NACL/PF 25 MG/5 ML
SYRINGE (ML) INTRAVENOUS
Status: DISCONTINUED | OUTPATIENT
Start: 2025-04-01 | End: 2025-04-01 | Stop reason: SDUPTHER

## 2025-04-01 RX ORDER — BISACODYL 5 MG/1
5 TABLET, DELAYED RELEASE ORAL DAILY PRN
Status: DISCONTINUED | OUTPATIENT
Start: 2025-04-01 | End: 2025-04-03 | Stop reason: HOSPADM

## 2025-04-01 RX ORDER — CHLORTHALIDONE 25 MG/1
25 TABLET ORAL DAILY
Status: DISCONTINUED | OUTPATIENT
Start: 2025-04-01 | End: 2025-04-03 | Stop reason: HOSPADM

## 2025-04-01 RX ADMIN — ALBUTEROL SULFATE 2.5 MG: 0.83 SOLUTION RESPIRATORY (INHALATION) at 15:17

## 2025-04-01 RX ADMIN — EPHEDRINE SULFATE 5 MG: 5 INJECTION INTRAVENOUS at 14:07

## 2025-04-01 RX ADMIN — FENTANYL CITRATE 50 MCG: 50 INJECTION INTRAMUSCULAR; INTRAVENOUS at 12:20

## 2025-04-01 RX ADMIN — VANCOMYCIN 1500 MG: 1.5 INJECTION, SOLUTION INTRAVENOUS at 11:07

## 2025-04-01 RX ADMIN — DEXMEDETOMIDINE HYDROCHLORIDE 4 MCG: 4 INJECTION, SOLUTION INTRAVENOUS at 13:18

## 2025-04-01 RX ADMIN — Medication 2000 MG: at 20:30

## 2025-04-01 RX ADMIN — ONDANSETRON 4 MG: 2 INJECTION, SOLUTION INTRAMUSCULAR; INTRAVENOUS at 14:35

## 2025-04-01 RX ADMIN — ACETAMINOPHEN 650 MG: 325 TABLET ORAL at 23:54

## 2025-04-01 RX ADMIN — SODIUM CHLORIDE: 0.9 INJECTION, SOLUTION INTRAVENOUS at 17:29

## 2025-04-01 RX ADMIN — LIDOCAINE HYDROCHLORIDE 100 MG: 20 INJECTION, SOLUTION EPIDURAL; INFILTRATION; INTRACAUDAL; PERINEURAL at 12:45

## 2025-04-01 RX ADMIN — DIMENHYDRINATE 50 MG: 50 TABLET ORAL at 10:57

## 2025-04-01 RX ADMIN — EPHEDRINE SULFATE 10 MG: 5 INJECTION INTRAVENOUS at 14:27

## 2025-04-01 RX ADMIN — Medication 0.1 MG: at 13:30

## 2025-04-01 RX ADMIN — ROCURONIUM BROMIDE 50 MG: 50 INJECTION INTRAVENOUS at 12:46

## 2025-04-01 RX ADMIN — EPHEDRINE SULFATE 5 MG: 5 INJECTION INTRAVENOUS at 13:58

## 2025-04-01 RX ADMIN — ACETAMINOPHEN 650 MG: 325 TABLET ORAL at 17:23

## 2025-04-01 RX ADMIN — Medication 0.1 MG: at 14:02

## 2025-04-01 RX ADMIN — EPHEDRINE SULFATE 5 MG: 5 INJECTION INTRAVENOUS at 13:59

## 2025-04-01 RX ADMIN — DOCUSATE SODIUM 50 MG AND SENNOSIDES 8.6 MG 1 TABLET: 8.6; 5 TABLET, FILM COATED ORAL at 20:29

## 2025-04-01 RX ADMIN — SUGAMMADEX 50 MG: 100 INJECTION, SOLUTION INTRAVENOUS at 14:35

## 2025-04-01 RX ADMIN — ACETAMINOPHEN 650 MG: 325 TABLET ORAL at 10:57

## 2025-04-01 RX ADMIN — CELECOXIB 100 MG: 100 CAPSULE ORAL at 20:27

## 2025-04-01 RX ADMIN — ROPIVACAINE HYDROCHLORIDE 10 ML: 5 INJECTION, SOLUTION EPIDURAL; INFILTRATION; PERINEURAL at 12:28

## 2025-04-01 RX ADMIN — PROPOFOL 100 MG: 10 INJECTION, EMULSION INTRAVENOUS at 12:45

## 2025-04-01 RX ADMIN — SODIUM CHLORIDE, SODIUM LACTATE, POTASSIUM CHLORIDE, AND CALCIUM CHLORIDE: .6; .31; .03; .02 INJECTION, SOLUTION INTRAVENOUS at 11:05

## 2025-04-01 RX ADMIN — OXYCODONE HYDROCHLORIDE 5 MG: 5 TABLET ORAL at 17:33

## 2025-04-01 RX ADMIN — OXYCODONE HYDROCHLORIDE 5 MG: 5 TABLET ORAL at 21:59

## 2025-04-01 RX ADMIN — DEXAMETHASONE SODIUM PHOSPHATE 10 MG: 10 INJECTION INTRAMUSCULAR; INTRAVENOUS at 12:28

## 2025-04-01 RX ADMIN — GLYCOPYRROLATE 0.1 MG: 0.2 INJECTION INTRAMUSCULAR; INTRAVENOUS at 14:01

## 2025-04-01 RX ADMIN — Medication 0.1 MG: at 14:26

## 2025-04-01 RX ADMIN — Medication 0.1 MG: at 13:51

## 2025-04-01 RX ADMIN — FENTANYL CITRATE 50 MCG: 50 INJECTION INTRAMUSCULAR; INTRAVENOUS at 13:43

## 2025-04-01 RX ADMIN — SUGAMMADEX 150 MG: 100 INJECTION, SOLUTION INTRAVENOUS at 14:40

## 2025-04-01 RX ADMIN — Medication 2000 MG: at 13:02

## 2025-04-01 RX ADMIN — PROPOFOL 50 MG: 10 INJECTION, EMULSION INTRAVENOUS at 14:19

## 2025-04-01 RX ADMIN — LABETALOL HYDROCHLORIDE 5 MG: 5 INJECTION INTRAVENOUS at 13:13

## 2025-04-01 RX ADMIN — DEXAMETHASONE SODIUM PHOSPHATE 4 MG: 4 INJECTION INTRA-ARTICULAR; INTRALESIONAL; INTRAMUSCULAR; INTRAVENOUS; SOFT TISSUE at 12:45

## 2025-04-01 ASSESSMENT — PAIN DESCRIPTION - PAIN TYPE: TYPE: ACUTE PAIN

## 2025-04-01 ASSESSMENT — PAIN DESCRIPTION - LOCATION
LOCATION: SHOULDER
LOCATION: SHOULDER
LOCATION: SHOULDER;BACK
LOCATION: BACK

## 2025-04-01 ASSESSMENT — PAIN DESCRIPTION - ORIENTATION
ORIENTATION: LOWER
ORIENTATION: LEFT

## 2025-04-01 ASSESSMENT — PAIN DESCRIPTION - DESCRIPTORS
DESCRIPTORS: ACHING
DESCRIPTORS: PINS AND NEEDLES
DESCRIPTORS: ACHING
DESCRIPTORS: ACHING;SHARP
DESCRIPTORS: ACHING;SORE

## 2025-04-01 ASSESSMENT — PAIN SCALES - GENERAL
PAINLEVEL_OUTOF10: 1
PAINLEVEL_OUTOF10: 0
PAINLEVEL_OUTOF10: 4
PAINLEVEL_OUTOF10: 2
PAINLEVEL_OUTOF10: 4
PAINLEVEL_OUTOF10: 2

## 2025-04-01 ASSESSMENT — PAIN - FUNCTIONAL ASSESSMENT
PAIN_FUNCTIONAL_ASSESSMENT: PREVENTS OR INTERFERES WITH MANY ACTIVE NOT PASSIVE ACTIVITIES
PAIN_FUNCTIONAL_ASSESSMENT: 0-10
PAIN_FUNCTIONAL_ASSESSMENT: PREVENTS OR INTERFERES SOME ACTIVE ACTIVITIES AND ADLS

## 2025-04-01 NOTE — PROGRESS NOTES
Physical Therapy  Facility/Department: [facility] MED SURG  Physical Therapy Initial Assessment    Name: Kaye Joyce  : 1945  MRN: [id number]  Date of Service: 2025    Discharge Recommendations:  Continue to assess pending progress, Subacute/Skilled Nursing Facility          Patient Diagnosis(es): There were no encounter diagnoses.  Past Medical History:  has a past medical history of Arthritis, Blood clot in vein, Brain bleed (HCC), Cataract, Closed nondisplaced fracture of surgical neck of right humerus with routine healing, DVT (deep venous thrombosis) (HCC), History of hip replacement, History of total bilateral knee replacement, Hypertension, S/P cardiac cath, Shoulder fracture, and Sleep apnea.  Past Surgical History:  has a past surgical history that includes joint replacement (Bilateral); Colonoscopy (2019); Colonoscopy (N/A, 2023); Upper gastrointestinal endoscopy (N/A, 2023); Esophagogastroduodenoscopy (2023); Colonoscopy (2023); Upper gastrointestinal endoscopy (N/A, 2023); and Esophagogastroduodenoscopy (2023).    Assessment  Assessment: Pt is a 79 y.o. female who is s/p L TSA. Pt utilizing walker at baseline with poor balance. Pt requires min A for bed mobility and CGA for STS with Min A during ambulation for walker assistance while maintaing L UE in NWB. Pt with very slow yady and unsteady gait requiring CGA at all times. Pt would benefit from skilled therapy to address these defecits and improve function to prior level.  Treatment Diagnosis: s/p L TSA  Specific Instructions for Next Treatment: once per day on weekends and holidays  Therapy Prognosis: Good  Decision Making: Low Complexity  Requires PT Follow-Up: Yes  Activity Tolerance  Activity Tolerance: Patient tolerated evaluation without incident    Plan  Physical Therapy Plan  General Plan: 2 times a day 7 days a week  Specific Instructions for Next Treatment: once per day on weekends and  NWBing to demonstrate increased independacne for safety with d/c.  Patient Goals   Patient Goals : Pt goal to return home       Education  Patient Education  Education Given To: Patient  Education Provided Comments: Pt edu: PT POC      Therapy Time   Individual Concurrent Group Co-treatment   Time In 1640         Time Out 1658         Minutes 18         Timed Code Treatment Minutes: 18 Minutes       AJAY MURGUIA PT.DPT

## 2025-04-01 NOTE — ANESTHESIA PRE PROCEDURE
Department of Anesthesiology  Preprocedure Note       Name:  Kaye Joyce   Age:  79 y.o.  :  1945                                          MRN:  [id number]         Date:  2025      Surgeon: Surgeon(s):  Darnell Wyatt MD    Procedure: Procedure(s):  SHOULDER TOTAL ARTHROPLASTY REVERSE    Medications prior to admission:   Prior to Admission medications    Medication Sig Start Date End Date Taking? Authorizing Provider   Polyethylene Glycol 400 (BLINK TEARS) 0.25 % GEL Apply to eye   Yes Darrell Holguin MD   DULoxetine (CYMBALTA) 30 MG extended release capsule Take 1 capsule by mouth daily   Yes Darrell Holguin MD   Glycerin-Polysorbate 80 (REFRESH DRY EYE THERAPY OP) Apply 1 drop to eye as needed   Yes Darrell Holguin MD   omeprazole (PRILOSEC) 20 MG capsule Take 1 capsule by mouth in the morning and at bedtime   Yes Darrell Holguin MD   atenolol-chlorthalidone (TENORETIC) 100-25 MG per tablet Take 1 tablet by mouth daily   Yes Darrell Holguin MD   ferrous sulfate (IRON 325) 325 (65 Fe) MG tablet Take 1 tablet by mouth every other day 7/3/24   Nicki Batista APRN - CNP   docusate sodium (COLACE, DULCOLAX) 100 MG CAPS Take 100 mg by mouth 2 times daily  Patient not taking: Reported on 2024   Nicki Batista APRN - CNP   ELIQUIS 2.5 MG TABS tablet TAKE 1 TABLET TWICE A DAY 24   Marek Palma MD   vitamin C (ASCORBIC ACID) 500 MG tablet Take 1 tablet by mouth daily  Patient not taking: Reported on 2025    Darrell Holguin MD   oxybutynin (DITROPAN XL) 15 MG extended release tablet Take 1 tablet by mouth daily  Patient not taking: Reported on 3/5/2025 2/1/22   Alejandra Hook DO   Multiple Vitamins-Minerals (DRY EYE FORMULA PO)     Darrell Holguin MD   Omega-3 Fatty Acids (FISH OIL) 1200 MG CAPS Take 1 tablet by mouth.    Darrell Holguin MD   calcium carbonate 600 MG TABS tablet Take 1 tablet by mouth in the

## 2025-04-01 NOTE — ANESTHESIA PROCEDURE NOTES
Peripheral Block    Patient location during procedure: pre-op  Reason for block: post-op pain management and at surgeon's request  Start time: 4/1/2025 12:20 PM  End time: 4/1/2025 12:28 PM  Staffing  Performed: resident/CRNA   Resident/CRNA: Oma Tello APRN - CRNA  Performed by: Oma Tello APRN - CRNA  Authorized by: Mya Cameron APRN - CRNA    Preanesthetic Checklist  Completed: patient identified, IV checked, site marked, risks and benefits discussed, surgical/procedural consents, equipment checked, pre-op evaluation, timeout performed, anesthesia consent given, oxygen available, monitors applied/VS acknowledged, fire risk safety assessment completed and verbalized and blood product R/B/A discussed and consented  Peripheral Block   Patient position: supine  Prep: ChloraPrep  Provider prep: mask and sterile gloves  Patient monitoring: continuous pulse ox, IV access and responsive to questions  Block type: Brachial plexus  Interscalene  Laterality: left  Injection technique: single-shot  Guidance: nerve stimulator and ultrasound guided  Local infiltration: ropivacaine  Infiltration strength: 0.5 %  Local infiltration: ropivacaine  Dose: 10 mL    Needle   Needle type: insulated echogenic nerve stimulator needle   Needle gauge: 22 G  Needle localization: nerve stimulator and ultrasound guidance  Needle insertion depth: 5 cm  Needle length: 5 cm  Assessment   Injection assessment: negative aspiration for heme, no paresthesia on injection, local visualized surrounding nerve on ultrasound and no intravascular symptoms  Paresthesia pain: none  Slow fractionated injection: yes  Hemodynamics: stable  Outcomes: uncomplicated and patient tolerated procedure well    Additional Notes  10 mL 0.5% ropivacaine + 5 mL PF 0.9% sodium chloride

## 2025-04-01 NOTE — OP NOTE
Operative Note      Patient: Kaye Joyce  YOB: 1945  MRN: [id number]    DATE OF SURGERY: 4/1/2025    PREOPERATIVE DIAGNOSIS:   Left shoulder primary osteoarthritis    POSTOPERATIVE DIAGNOSIS:  Same    SURGERY:  Left reverse total shoulder arthroplasty  Left shoulder open biceps tenodesis    SURGEON: Darnell Wyatt M.D.    ASSISTANT:  Jo Martin    ANESTHESIA: General with interscalene block     EBL: 200 mL    COMPLICATIONS: None    IMPLANTS:   [employer] Aequalis Perform size 2 humeral stem with +6 mm 10-degree poly insert (for 145 degree construct) for 36 mm glenosphere.  Aequalis PerFORM 25 mm 15-degree full-wedge baseplate with 36 mm standard glenosphere.      INDICATIONS:    Patient presented with chronic, progressive shoulder pain and weakness. Clinical exam and imaging revealed severe arthritis. Patient attempted nonsurgical measures, but was unable to obtain meaningful relief. Therefore, patient elected to proceed with surgical intervention. Informed consent was obtained following discussion of risks and benefits    DESCRIPTION OF PROCEDURE:  Patient was identified in preop holding area. With the patient's agreement, the surgical site was marked. The patient was taken to the operating room and placed supine on the operative table. Prophylactic IV antibiotics were administered. General anesthesia was induced. Patient was then positioned in the beachchair position with the cervical spine in neutral alignment. All bony processes were padded. The operative extremity was then prepped and draped in usual sterile fashion.     Preoperative timeout taken to confirm the proper patient, surgical site, and procedure. We then began by creating incision over the anterior aspect of the shoulder. Sharp dissection was carried down through the skin and subcutaneous fat. Hemostasis was secured with electrocautery. Cephalic vein was identified and the deltopectoral interval was then bluntly developed. We then   STEM HUM L 62 MM CLLR SHARAD 34 MM DSTL SHARAD 7 MM SZ 2 SHRT STD [id number] [employer] [facility]  Left 1 Implanted   INSERT HUM 10 DEG SHARAD 36 MM THK 6 MM SZ 1/2 UHMWPE SHLDR - [id number]  INSERT HUM 10 DEG SHARAD 36 MM THK 6 MM SZ 1/2 UHMWPE SHLDR [id number] [employer]WD  Left 1 Implanted         Electronically signed by Darnell Wyatt MD on 4/1/2025 at 2:52 PM

## 2025-04-01 NOTE — DISCHARGE INSTRUCTIONS
ORTHO SURGERY DISCHARGE INSTRUCTIONS    1.  Do not drive or operate hazardous machinery until cleared by your surgeon.    2.  Do not make important personal or business decisions while taking narcotic pain medications.    3.  Do not drink alcoholic beverages.    4.  Do not use tobacco products.    5.  Eat light foods (Jell-O, soups, etc....) and drink plenty of fluids (water, Sprite, etc...) up to 8 glasses per day, as you can tolerate.    6.  If your bandages become soaked with bright red blood, place another dressing pad over your bandages.  (DO NOT remove original bandage.)  Call your surgeon for further instructions.  A small amount of bright red blood is to be expected.    7.  Limit your activities .  Do not engage in heavy work until your surgeon gives you permission.      8.  Report the following signs or any questions regarding your physical condition to your surgeon immediately:    Excessive swelling of, or around the wound area.    Redness.    Temperature of 100 degrees (F) or above.    Excessive pain.    9.  Call your surgeon at the office, [phone], for any questions regarding your surgery.  For urgent concerns after office hours, you may call Dr. Wyatt on his cell phone, [phone].    10. Follow-up with your surgeon and physical therapy as scheduled.      SPECIAL INSTRUCTIONS AND MEDICATIONS    1.  Maintain shoulder in sling, except for dressing, grooming and home exercises.    2.  Move fingers/toes to improve circulation.    3.  Use ibuprofen and/or Tylenol for pain.  You may also use prescribed pain pill (which also includes tylenol) as directed by the doctor.  Do not take more than 3,000 mg tylenol in a day.    4.  Keep your dressing on and dry.  You may shower over the waterproof dressing if it remains sealed.    5.  Wear LINDA hose until directed by your surgeon.  You may remove them to shower.     6.  Take Aspirin 325 mg daily for 4 weeks.

## 2025-04-01 NOTE — PROGRESS NOTES
Therapy in with pt at this time ambulating in room. Family at bedside. Family brought in ice machine for shoulder but does not have a power cord so it cannot be used. Ice packs given instead. Pt was instructed to call for pain medication when she starts to feel the numbness in her left arm/hand change from how it currently feels instead of waiting for the pain to start. Pt was educated on the nerve block and how it is unpredictable for the amount of time it works and whether it wears off gradually or quickly. Pt agreed to call nursing when she notices that her hand/arm feels different than what it does currently. Left hand elevated on pillow. Pt was instructed to perform hand pumps to prevent swelling of her hand as well.

## 2025-04-01 NOTE — PROGRESS NOTES
Discharge Criteria    Inpatients must meet Criteria 1 through 7. All other patients are either YES or N/A. If a NO is chosen then Anesthesia or Surgeon must be notified.      1.  Minimum 30 minutes after last dose of sedative medication.    Yes      2.  Systolic BP between 90 - 160. Diastolic BP between 60 - 90.    Yes      3.  Pulse between 60 - 120    Yes      4.  Respirations between 8 - 25.    Yes      5.  SpO2 92% - 100%.    Yes      6.  Able to cough and swallow or return to baseline function.    Yes      7.  Alert and oriented or return to baseline mental status.    Yes

## 2025-04-01 NOTE — PROGRESS NOTES
education, & procurement, Self-Care / ADL    Restrictions  Restrictions/Precautions  Restrictions/Precautions: Weight Bearing  Upper Extremity Weight Bearing Restrictions  Left Upper Extremity Weight Bearing: Non Weight Bearing    Subjective  General  Chart Reviewed: Yes  Patient assessed for rehabilitation services?: Yes  Family / Caregiver Present: Yes  Referring Practitioner: Dr. Darnell Wyatt  Diagnosis: Localized primary osteoarthritis of left shoulder region  Subjective  Subjective: Upon therapist's arrival, patient was resting in bed with family members present. Patient agreeable to initial occupational therapy/physical therapy evaluation. Patient denies pain and reports intermittent numbness in L arm/shoulder. Patient able to move fingers of L hand.     Social/Functional History  Social/Functional History  Lives With:  ([facility]  Type of Home: Senior housing apartment  Home Layout: One level  Bathroom Shower/Tub: Walk-in shower  Bathroom Toilet: Handicap height  Bathroom Equipment: Grab bars in shower, Grab bars around toilet, Shower chair  Home Equipment: Wheelchair - Manual, Walker - Rolling, Rollator, Cane  Receives Help From: Personal care attendant  Prior Level of Assist for ADLs: Independent  Prior Level of Assist for Homemaking: Independent  Homemaking Responsibilities: No  Prior Level of Assist for Ambulation: Independent household ambulator, with or without device, Independent community ambulator, with or without device  Prior Level of Assist for Transfers: Independent  Active : No    Objective  Vision  Vision: Impaired  Vision Exceptions: Wears glasses at all times  Hearing  Hearing: Exceptions to WFL       Observation/Palpation  Posture: Fair  Safety Devices  Type of Devices: All zhao prominences offloaded;Call light within reach;Left in bed        AROM:  (s/p L tsa)  PROM:  (s/p L tsa)  Strength:  (NT s/p L tsa)  Coordination:  (NT s/p L tsa)  ADL  Feeding: Modified  Individual Concurrent Group Co-treatment   Time In 1640         Time Out 1658         Minutes 18                 MARTINE Muniz, OTR/L   04/01/25

## 2025-04-01 NOTE — ANESTHESIA POSTPROCEDURE EVALUATION
Department of Anesthesiology  Postprocedure Note    Patient: Kaye Joyce  MRN: [id number]  YOB: 1945  Date of evaluation: 4/1/2025    Procedure Summary       Date: 04/01/25 Room / Location: [address]    Anesthesia Start: 1234 Anesthesia Stop: 1503    Procedure: SHOULDER TOTAL ARTHROPLASTY REVERSE (Left: Shoulder) Diagnosis:       Arthritis of left shoulder region      (Arthritis of left shoulder region [M19.012])    Surgeons: Darnell Wyatt MD Responsible Provider: Mya Cameron APRN - CRNA    Anesthesia Type: general, TIVA, regional ASA Status: 3            Anesthesia Type: No value filed.    Katiana Phase I: Katiana Score: 8    Katiana Phase II:      Anesthesia Post Evaluation    Patient location during evaluation: PACU  Patient participation: complete - patient participated  Level of consciousness: awake and alert  Pain score: 0  Airway patency: patent  Nausea & Vomiting: no nausea and no vomiting  Cardiovascular status: blood pressure returned to baseline and hemodynamically stable  Respiratory status: acceptable, nonlabored ventilation and spontaneous ventilation  Hydration status: euvolemic  Multimodal analgesia pain management approach  Pain management: adequate        No notable events documented.

## 2025-04-01 NOTE — PROGRESS NOTES
Pt admitted to room 336 from surgery S/P total left shoulder. See admission assessment for more information. Pt oriented to room, call light and hospital procedures.

## 2025-04-02 LAB
ANION GAP SERPL CALCULATED.3IONS-SCNC: 9 MMOL/L (ref 9–16)
BUN SERPL-MCNC: 26 MG/DL (ref 8–23)
BUN/CREAT SERPL: 24 (ref 9–20)
CALCIUM SERPL-MCNC: 9.4 MG/DL (ref 8.6–10.4)
CHLORIDE SERPL-SCNC: 104 MMOL/L (ref 98–107)
CO2 SERPL-SCNC: 28 MMOL/L (ref 20–31)
CREAT SERPL-MCNC: 1.1 MG/DL (ref 0.5–0.9)
GFR, ESTIMATED: 50 ML/MIN/1.73M2
GLUCOSE SERPL-MCNC: 160 MG/DL (ref 74–99)
HCT VFR BLD AUTO: 35.8 % (ref 36.3–47.1)
HGB BLD-MCNC: 12 G/DL (ref 11.9–15.1)
POTASSIUM SERPL-SCNC: 3.8 MMOL/L (ref 3.7–5.3)
SODIUM SERPL-SCNC: 141 MMOL/L (ref 136–145)

## 2025-04-02 PROCEDURE — 6370000000 HC RX 637 (ALT 250 FOR IP): Performed by: NURSE PRACTITIONER

## 2025-04-02 PROCEDURE — 2500000003 HC RX 250 WO HCPCS: Performed by: ORTHOPAEDIC SURGERY

## 2025-04-02 PROCEDURE — 85014 HEMATOCRIT: CPT

## 2025-04-02 PROCEDURE — 6370000000 HC RX 637 (ALT 250 FOR IP): Performed by: ORTHOPAEDIC SURGERY

## 2025-04-02 PROCEDURE — 6360000002 HC RX W HCPCS: Performed by: ORTHOPAEDIC SURGERY

## 2025-04-02 PROCEDURE — G0378 HOSPITAL OBSERVATION PER HR: HCPCS

## 2025-04-02 PROCEDURE — 80048 BASIC METABOLIC PNL TOTAL CA: CPT

## 2025-04-02 PROCEDURE — 85018 HEMOGLOBIN: CPT

## 2025-04-02 PROCEDURE — 97110 THERAPEUTIC EXERCISES: CPT

## 2025-04-02 PROCEDURE — 94761 N-INVAS EAR/PLS OXIMETRY MLT: CPT

## 2025-04-02 PROCEDURE — 2700000000 HC OXYGEN THERAPY PER DAY

## 2025-04-02 PROCEDURE — 97116 GAIT TRAINING THERAPY: CPT

## 2025-04-02 PROCEDURE — 2580000003 HC RX 258: Performed by: ORTHOPAEDIC SURGERY

## 2025-04-02 RX ORDER — HYDROCODONE BITARTRATE AND ACETAMINOPHEN 5; 325 MG/1; MG/1
1 TABLET ORAL EVERY 4 HOURS PRN
Qty: 42 TABLET | Refills: 0 | Status: SHIPPED | OUTPATIENT
Start: 2025-04-02 | End: 2025-04-09

## 2025-04-02 RX ADMIN — SODIUM CHLORIDE, PRESERVATIVE FREE 10 ML: 5 INJECTION INTRAVENOUS at 20:41

## 2025-04-02 RX ADMIN — OXYCODONE HYDROCHLORIDE 5 MG: 5 TABLET ORAL at 12:33

## 2025-04-02 RX ADMIN — ACETAMINOPHEN 650 MG: 325 TABLET ORAL at 23:14

## 2025-04-02 RX ADMIN — CHLORTHALIDONE 25 MG: 25 TABLET ORAL at 08:02

## 2025-04-02 RX ADMIN — OXYCODONE HYDROCHLORIDE 5 MG: 5 TABLET ORAL at 08:01

## 2025-04-02 RX ADMIN — DOCUSATE SODIUM 50 MG AND SENNOSIDES 8.6 MG 1 TABLET: 8.6; 5 TABLET, FILM COATED ORAL at 20:40

## 2025-04-02 RX ADMIN — CELECOXIB 100 MG: 100 CAPSULE ORAL at 08:02

## 2025-04-02 RX ADMIN — DOCUSATE SODIUM 50 MG AND SENNOSIDES 8.6 MG 1 TABLET: 8.6; 5 TABLET, FILM COATED ORAL at 08:01

## 2025-04-02 RX ADMIN — SODIUM CHLORIDE, PRESERVATIVE FREE 10 ML: 5 INJECTION INTRAVENOUS at 08:03

## 2025-04-02 RX ADMIN — Medication 2000 MG: at 05:26

## 2025-04-02 RX ADMIN — FERROUS SULFATE TAB 325 MG (65 MG ELEMENTAL FE) 325 MG: 325 (65 FE) TAB at 08:02

## 2025-04-02 RX ADMIN — OXYCODONE HYDROCHLORIDE 5 MG: 5 TABLET ORAL at 20:40

## 2025-04-02 RX ADMIN — SODIUM CHLORIDE: 0.9 INJECTION, SOLUTION INTRAVENOUS at 03:39

## 2025-04-02 RX ADMIN — APIXABAN 2.5 MG: 2.5 TABLET, FILM COATED ORAL at 08:03

## 2025-04-02 RX ADMIN — APIXABAN 2.5 MG: 2.5 TABLET, FILM COATED ORAL at 20:40

## 2025-04-02 RX ADMIN — ACETAMINOPHEN 650 MG: 325 TABLET ORAL at 05:26

## 2025-04-02 RX ADMIN — ACETAMINOPHEN 650 MG: 325 TABLET ORAL at 17:29

## 2025-04-02 RX ADMIN — ATENOLOL 50 MG: 50 TABLET ORAL at 08:02

## 2025-04-02 RX ADMIN — PANTOPRAZOLE SODIUM 40 MG: 40 TABLET, DELAYED RELEASE ORAL at 08:02

## 2025-04-02 RX ADMIN — DULOXETINE HYDROCHLORIDE 30 MG: 30 CAPSULE, DELAYED RELEASE ORAL at 08:01

## 2025-04-02 ASSESSMENT — PAIN DESCRIPTION - DESCRIPTORS
DESCRIPTORS: SHARP;STABBING;ACHING
DESCRIPTORS: ACHING
DESCRIPTORS: ACHING;SHOOTING;SHARP
DESCRIPTORS: SORE
DESCRIPTORS: ACHING;DISCOMFORT
DESCRIPTORS: ACHING;DISCOMFORT

## 2025-04-02 ASSESSMENT — PAIN DESCRIPTION - ORIENTATION
ORIENTATION: LEFT

## 2025-04-02 ASSESSMENT — PAIN DESCRIPTION - ONSET: ONSET: ON-GOING

## 2025-04-02 ASSESSMENT — PAIN DESCRIPTION - PAIN TYPE
TYPE: SURGICAL PAIN

## 2025-04-02 ASSESSMENT — PAIN DESCRIPTION - FREQUENCY
FREQUENCY: CONTINUOUS

## 2025-04-02 ASSESSMENT — PAIN DESCRIPTION - LOCATION
LOCATION: SHOULDER

## 2025-04-02 ASSESSMENT — PAIN SCALES - GENERAL
PAINLEVEL_OUTOF10: 2
PAINLEVEL_OUTOF10: 4
PAINLEVEL_OUTOF10: 2
PAINLEVEL_OUTOF10: 8
PAINLEVEL_OUTOF10: 6
PAINLEVEL_OUTOF10: 0
PAINLEVEL_OUTOF10: 2
PAINLEVEL_OUTOF10: 6

## 2025-04-02 ASSESSMENT — PAIN - FUNCTIONAL ASSESSMENT: PAIN_FUNCTIONAL_ASSESSMENT: ACTIVITIES ARE NOT PREVENTED

## 2025-04-02 NOTE — DISCHARGE INSTR - COC
Continuity of Care Form    Patient Name: Kaye Joyce   :  1945  MRN:  [id number]    Admit date:  2025  Discharge date:  4/3/2025        Code Status Order: Full Code   Advance Directives:    Date/Time Healthcare Directive Type of Healthcare Directive Copy in Chart Healthcare Agent Appointed Healthcare Agent's Name Healthcare Agent's Phone Number    [phone] Yes, patient has an advance directive for healthcare treatment  Living will;Durable power of  for health care  --  --  --  --             Admitting Physician:  Darnell Wyatt MD  PCP: Robert Gonsales MD    Discharging Nurse: Opal HODGES RN    Discharging Hospital Unit/Room#: 0336/0336-01  Discharging Unit Phone Number: [phone]        Emergency Contact:   Extended Emergency Contact Information  Primary Emergency Contact: Joe Joyce  Home Phone: [phone]  Relation: Child   needed? No  Secondary Emergency Contact: Shauna Ayala  [place] Phone: [phone]  Relation: Niece/Nephew    Past Surgical History:  Past Surgical History:   Procedure Laterality Date    COLONOSCOPY  2019    COLONOSCOPY N/A 2023    COLORECTAL CANCER SCREENING, NOT HIGH RISK performed by Chasity Perez MD at [facility] OR    COLONOSCOPY  2023    -diverticulosis,hemorrhoids    ESOPHAGOGASTRODUODENOSCOPY  2023    -gastric antral vascular ectasia-clip placed    ESOPHAGOGASTRODUODENOSCOPY  2023    [id number]    JOINT REPLACEMENT Bilateral     knees and hips    UPPER GASTROINTESTINAL ENDOSCOPY N/A 2023    EGD ESOPHAGOGASTRODUODENOSCOPY performed by Chasity Perez MD at [facility] OR    UPPER GASTROINTESTINAL ENDOSCOPY N/A 2023    EGD ESOPHAGOGASTRODUODENOSCOPY performed by Chasity Perez MD at [facility] OR       Immunization History:   Immunization History   Administered Date(s) Administered    COVID-19, MODERNA BLUE border, Primary or Immunocompromised, (age 12y+), IM, 100 mcg/0.5mL 2021,  Physical Therapy    Outpatient Physical Therapy    [x] [place]  Phone: [phone]  Fax: [phone]      [] [place]  Phone: [phone]  Fax: [phone]    Physical Therapy  Cancellation/No-show Note  Patient Name:  Judy Kearns  :  1971   Date:  2024  Cancelled visits to date: 1  No-shows to date: 0    For today's appointment patient:  [x]  Cancelled  []  Rescheduled appointment  []  No-show     Reason given by patient:  []  Patient ill  []  Conflicting appointment  []  No transportation    []  Conflict with work  []  No reason given  [x]  Other:  Insurance Issue    Comments:      Electronically signed by: Shruthi Nice, PT       personal belongings (please select all that are sent with patient):  Shannon    RN SIGNATURE:        CASE MANAGEMENT/SOCIAL WORK SECTION    Inpatient Status Date: 4/1/25    Readmission Risk Assessment Score:  [facility] RISK OF UNPLANNED READMISSION 2.0             0 Total Score        Discharging to Facility/ Agency   Name: [facility]  Address:[address]  Phone:[phone]  Fax:[phone]    Dialysis Facility (if applicable)   Name:  Address:  Dialysis Schedule:  Phone:  Fax:    / signature: Electronically signed by MEJIA Snyder on 4/2/25 at 11:11 AM EDT    PHYSICIAN SECTION    Prognosis: Fair    Condition at Discharge: Stable    Rehab Potential (if transferring to Rehab): Fair    Recommended Labs or Other Treatments After Discharge: na    Physician Certification: I certify the above information and transfer of Kaye Joyce  is necessary for the continuing treatment of the diagnosis listed and that she requires Skilled Nursing Facility for less 30 days.     Update Admission H&P: No change in H&P    PHYSICIAN SIGNATURE:  Electronically signed by GRIS Valdovinos CNP on 4/3/25 at 10:44 AM EDT

## 2025-04-02 NOTE — PROGRESS NOTES
Writer at bedside for shift assessment. PT A&O x4, they are able to answer questions and follow commands appropriately. Vitals and assessment completed as charted, see flowsheets. PT denies any other needs at this time and is resting comfortably. Call light in reach, bed in the lowest position and locked, care on-going.

## 2025-04-02 NOTE — FLOWSHEET NOTE
Awake, resting in bed. Vitals checked and assessment completed. Alert and oriented x 4. Good circulation check at left arm . Dressing to left shoulder clean, dry and intact. No needs at present time. Call light within reach. Care ongoing.

## 2025-04-02 NOTE — PLAN OF CARE
Problem: Discharge Planning  Goal: Discharge to home or other facility with appropriate resources  4/2/2025 1946 by Ofe Brooks RN  Outcome: Progressing  Flowsheets (Taken 4/2/2025 1946)  Discharge to home or other facility with appropriate resources: Identify barriers to discharge with patient and caregiver  4/2/2025 1210 by Opal Karimi RN  Outcome: Progressing     Problem: Pain  Goal: Verbalizes/displays adequate comfort level or baseline comfort level  4/2/2025 1946 by Ofe Brooks RN  Outcome: Progressing  Flowsheets (Taken 4/2/2025 1946)  Verbalizes/displays adequate comfort level or baseline comfort level:   Encourage patient to monitor pain and request assistance   Assess pain using appropriate pain scale   Implement non-pharmacological measures as appropriate and evaluate response   Notify Licensed Independent Practitioner if interventions unsuccessful or patient reports new pain   Administer analgesics based on type and severity of pain and evaluate response  4/2/2025 1210 by Opal Karimi RN  Outcome: Progressing     Problem: Safety - Adult  Goal: Free from fall injury  4/2/2025 1946 by Ofe Brooks RN  Outcome: Progressing  4/2/2025 1210 by Opal Karimi RN  Outcome: Progressing     Problem: ABCDS Injury Assessment  Goal: Absence of physical injury  4/2/2025 1946 by Ofe Brooks RN  Outcome: Progressing  Flowsheets (Taken 4/2/2025 1946)  Absence of Physical Injury: Implement safety measures based on patient assessment  4/2/2025 1210 by Opal Karimi RN  Outcome: Progressing     Problem: Skin/Tissue Integrity  Goal: Skin integrity remains intact  Description: 1.  Monitor for areas of redness and/or skin breakdown  2.  Assess vascular access sites hourly  3.  Every 4-6 hours minimum:  Change oxygen saturation probe site  4.  Every 4-6 hours:  If on nasal continuous positive airway pressure, respiratory therapy assess nares and determine need for appliance change or resting

## 2025-04-02 NOTE — PROGRESS NOTES
Occupational Therapy  Facility/Department: [facility] MED SURG  Daily Treatment Note  NAME: Kaye Joyce  : 1945  MRN: [id number]    Date of Service: 2025    Discharge Recommendations:  Continue to assess pending progress, Subacute/Skilled Nursing Facility         Patient Diagnosis(es): The encounter diagnosis was Post-op pain.     Assessment   Activity Tolerance: Patient tolerated treatment well  Discharge Recommendations: Continue to assess pending progress;Subacute/Skilled Nursing Facility     Plan  Occupational Therapy Plan  Times Per Day: Once a day  Days Per Week: 7 Days  Current Treatment Recommendations: Strengthening;ROM;Functional mobility training;Balance training;Endurance training;Safety education & training;Patient/Caregiver education & training;Equipment evaluation, education, & procurement;Self-Care / ADL    Restrictions  Restrictions/Precautions  Restrictions/Precautions: Weight Bearing  Upper Extremity Weight Bearing Restrictions  Left Upper Extremity Weight Bearing: Non Weight Bearing    Subjective  Subjective  Subjective: Pt sitting up in bedside chair upon arrival. Pt agreed to participate in therapy session.  Pain: Pt reports pain in L shoulder however did not rate.            Objective  Vitals             OT Exercises  Exercise Treatment: Pt complete RUE ther ex with 1# dumbbell x 7 planes x 15 reps x 1 set to increase UE strength and endurance in order to ease completion of ADL tasks. Pt required RBs as needed secondary to fatigue.     Safety Devices  Type of Devices: Left in chair;Chair alarm in place;All fall risk precautions in place    Patient Education  Education Given To: Patient  Education Provided: Role of Therapy;Plan of Care;Home Exercise Program  Education Method: Demonstration;Verbal  Barriers to Learning: None  Education Outcome: Verbalized understanding;Demonstrated understanding    Goals  Short Term Goals  Time Frame for Short Term Goals: 21 days  Short Term Goal 1:

## 2025-04-02 NOTE — FLOWSHEET NOTE
Sitting up in chair eating lunch. Vitals checked. C/O left shoulder pain. Roxicodone PO given. No needs at present time. Call light within reach. Care ongoing.

## 2025-04-02 NOTE — PROGRESS NOTES
Physical Therapy  Facility/Department: [facility] MED SURG  Daily Treatment Note  NAME: Kaye Joyce  : 1945  MRN: [id number]    Date of Service: 2025    Discharge Recommendations:  Continue to assess pending progress, Subacute/Skilled Nursing Facility      Patient Diagnosis(es): The encounter diagnosis was Post-op pain.    Assessment  Assessment: Bed mobility: Min A x1. Transfers: Min A x1. Pt ambulated 20ft with WW and gait belt. Noted shortened step length on both sides with a slow yady. Pt educated on relaxing the L arm into the sling. Sling was adjusted for proper fit. Pt requires continued education for AD use. Try dominick-walker with Pt for afternoon treatment. Pt completed supine and seated B LE exercises x15 reps in all available planes.  Activity Tolerance: Patient tolerated treatment well    Plan  Physical Therapy Plan  General Plan: 2 times a day 7 days a week  Specific Instructions for Next Treatment: once per day on weekends and holidays  Current Treatment Recommendations: Strengthening;ROM;Balance training;Functional mobility training;Transfer training;Endurance training;Gait training;Stair training;Neuromuscular re-education;Pain management;Home exercise program;Therapeutic activities    Restrictions  Restrictions/Precautions  Restrictions/Precautions: Weight Bearing  Upper Extremity Weight Bearing Restrictions  Left Upper Extremity Weight Bearing: Non Weight Bearing     Subjective   Subjective  Subjective: Pt in bed upon arrival, agreeable to therapy at this time  Pain: denies    Objective  Bed Mobility Training  Bed Mobility Training: Yes  Overall Level of Assistance: Minimal assistance  Interventions: Verbal cues  Supine to Sit: Minimal assistance  Scooting: Minimal assistance  Transfer Training  Transfer Training: Yes  Overall Level of Assistance: Minimal assistance  Interventions: Verbal cues;Safety awareness training  Sit to Stand: Minimal assistance  Stand to Sit: Minimal

## 2025-04-02 NOTE — PROGRESS NOTES
Physical Therapy  Facility/Department: [facility] MED SURG  Daily Treatment Note  NAME: Kaye Joyce  : 1945  MRN: [id number]    Date of Service: 2025    Discharge Recommendations:  Continue to assess pending progress, Subacute/Skilled Nursing Facility     Patient Diagnosis(es): The encounter diagnosis was Post-op pain.    Assessment  Assessment: Transfers:MinAx1. Pt ambulated 25ftx1 with dominick-walker and CGA/MinAx1 for safety. Slow cadance noted with BLE decreased step length. No LOB or unsteadiness. Pt felt more stable with dominick-walker.  Pt completed seated BLE therex x15 in all planes of motion. Pt sling adjusted at this time for proper fit.  Activity Tolerance: Patient tolerated treatment well    Plan  Physical Therapy Plan  General Plan: 2 times a day 7 days a week  Specific Instructions for Next Treatment: once per day on weekends and holidays  Current Treatment Recommendations: Strengthening;ROM;Balance training;Functional mobility training;Transfer training;Endurance training;Gait training;Stair training;Neuromuscular re-education;Pain management;Home exercise program;Therapeutic activities    Restrictions  Restrictions/Precautions  Restrictions/Precautions: Weight Bearing  Upper Extremity Weight Bearing Restrictions  Left Upper Extremity Weight Bearing: Non Weight Bearing     Subjective   Subjective  Subjective: Pt in chair upon arrival, agreeable to therapy at this time  Pain: denies    Objective  Bed Mobility Training  Bed Mobility Training: No  Overall Level of Assistance: Minimal assistance  Interventions: Verbal cues  Supine to Sit: Minimal assistance  Scooting: Minimal assistance  Transfer Training  Transfer Training: Yes  Overall Level of Assistance: Minimal assistance  Interventions: Verbal cues;Safety awareness training  Sit to Stand: Minimal assistance  Stand to Sit: Minimal assistance  Bed to Chair: Minimal assistance  Gait Training  Right Side Weight Bearing: Full  Left Side Weight

## 2025-04-02 NOTE — PLAN OF CARE
Problem: Discharge Planning  Goal: Discharge to home or other facility with appropriate resources  4/2/2025 1210 by Opal Karimi RN  Outcome: Progressing  4/1/2025 2310 by Ary Vasquez RN  Outcome: Progressing  Flowsheets (Taken 4/1/2025 2310)  Discharge to home or other facility with appropriate resources:   Identify barriers to discharge with patient and caregiver   Arrange for needed discharge resources and transportation as appropriate   Identify discharge learning needs (meds, wound care, etc)  4/1/2025 2306 by Ary Vasquez RN  Outcome: Progressing  Flowsheets (Taken 4/1/2025 2306)  Discharge to home or other facility with appropriate resources:   Identify barriers to discharge with patient and caregiver   Arrange for needed discharge resources and transportation as appropriate   Identify discharge learning needs (meds, wound care, etc)     Problem: Pain  Goal: Verbalizes/displays adequate comfort level or baseline comfort level  4/2/2025 1210 by Opal Karimi RN  Outcome: Progressing  4/1/2025 2306 by Ary Vasquez RN  Outcome: Progressing  Flowsheets  Taken 4/1/2025 2306 by Ary Vasquez RN  Verbalizes/displays adequate comfort level or baseline comfort level:   Encourage patient to monitor pain and request assistance   Assess pain using appropriate pain scale   Administer analgesics based on type and severity of pain and evaluate response  Taken 4/1/2025 1605 by Nitza Musa RN  Verbalizes/displays adequate comfort level or baseline comfort level: Encourage patient to monitor pain and request assistance     Problem: Safety - Adult  Goal: Free from fall injury  4/2/2025 1210 by Opal Karimi RN  Outcome: Progressing  4/1/2025 2306 by Ary Vasquez RN  Outcome: Progressing  Flowsheets (Taken 4/1/2025 2306)  Free From Fall Injury:   Instruct family/caregiver on patient safety   Based on caregiver fall risk screen, instruct  family/caregiver to ask for assistance with transferring infant if caregiver noted to have fall risk factors     Problem: ABCDS Injury Assessment  Goal: Absence of physical injury  4/2/2025 1210 by Opal Karimi, RN  Outcome: Progressing  4/1/2025 2306 by Ary Vasquez, RN  Outcome: Progressing  Flowsheets (Taken 4/1/2025 2306)  Absence of Physical Injury: Implement safety measures based on patient assessment     Problem: Skin/Tissue Integrity  Goal: Skin integrity remains intact  Outcome: Progressing

## 2025-04-02 NOTE — PROGRESS NOTES
Nutrition Assessment     Type and Reason for Visit: Initial    Nutrition Recommendations/Plan:   Continue current diet.      Malnutrition Assessment:  Malnutrition Status: At risk for malnutrition    Nutrition Assessment:  Increased nutrient needs r/t acute injury/trauma aeb healing needs from s/p shoulder surgery. Pt reports she has been eating well. Pt encouraged to consume protein foods to aid healing needs.      Nutrition Related Findings:   no edema. No bowel sound data. Wound Type: None    Current Nutrition Therapies:    ADULT DIET; Regular    Anthropometric Measures:  Height: 152.4 cm (5')  Current Body Wt: 98.4 kg (216 lb 14.9 oz)   BMI: 42.4      Lab Results   Component Value Date/Time    LABA1C 5.5 05/02/2014 10:10 AM      Lab Results   Component Value Date    VITD25 33.4 02/10/2025       Nutrition Diagnosis:   Increased nutrient needs related to acute injury/trauma as evidenced by s/p surgery    Nutrition Interventions:   Food and/or Nutrient Delivery: Continue Current Diet  Nutrition Education/Counseling: Education/Counseling initiated  Coordination of Nutrition Care: Continue to monitor while inpatient  Plan of Care discussed with: Patient    Goals:  Goals: Meet at least 75% of estimated needs  Type of Goal: New goal  Previous Goal Met: New Goal    Nutrition Monitoring and Evaluation:   Behavioral-Environmental Outcomes: None Identified  Food/Nutrient Intake Outcomes: Food and Nutrient Intake  Physical Signs/Symptoms Outcomes: Biochemical Data, Weight, Skin    Discharge Planning:    Continue current diet     KATHY VERMA RD, LD  Contact: [phone]

## 2025-04-02 NOTE — CONSULTS
prescriptions, over-the-counter products, herbals, cannabinoid products and bitamin/mineral/dietary/nutritional supplements.  [If 'yes\", STOP HERE]     []  The patient is not eligible for medication reconciliation; the patient is in an emergent medical situation where delaying treatment would jeopardize the patient's health    []  I did NOT confirm, update or review the patient's current list of medications today.  [DOES NOT SATISFY [employer] PERFORMANCE]      Nicki Batista, GRIS - CNP, GRIS, NP-C  4/2/2025, 7:12 AM

## 2025-04-02 NOTE — CARE COORDINATION
Case Management Assessment  Initial Evaluation    Date/Time of Evaluation: 4/2/2025 10:28 AM  Assessment Completed by: Landon Alvarado RN    If patient is discharged prior to next notation, then this note serves as note for discharge by case management.    Patient Name: Kaye Joyce                   YOB: 1945  Diagnosis: Arthritis of left shoulder region [M19.012]  Localized primary osteoarthritis of left shoulder region [M19.012]                   Date / Time: 4/1/2025 10:09 AM    Patient Admission Status: Observation   Readmission Risk (Low < 19, Mod (19-27), High > 27): Readmission Risk Score: 10.5    Current PCP: Robert Gonsales MD  PCP verified by CM? (P) Yes    Chart Reviewed: Yes      History Provided by: (P) Patient  Patient Orientation: (P) Alert and Oriented, Person, Place, Situation    Patient Cognition: (P) Alert    Hospitalization in the last 30 days (Readmission):  No    If yes, Readmission Assessment in CM Navigator will be completed.    Advance Directives:      Code Status: Full Code   Patient's Primary Decision Maker is: (P) Legal Next of Kin    Primary Decision Maker: Joe Joyce  Child - [phone]    Discharge Planning:    Patient lives with: (P) Other (Comment) (At [place] Type of Home: (P) Assisted living  Primary Care Giver: (P) Self  Patient Support Systems include: (P) Family Members, Friends/Neighbors, Other (Comment) (Assisted living staff.)   Current Financial resources: (P) Medicare  Current community resources: (P) Assisted Living  Current services prior to admission: (P) Durable Medical Equipment, Home Care, C-pap            Current DME: (P) Walker, Cpap            Type of Home Care services:  (P) OT, PT    ADLS  Prior functional level: (P) Assistance with the following:, Cooking, Housework, Shopping  Current functional level: (P) Cooking, Housework, Shopping, Bathing, Dressing, Assistance with the following:    PT AM-PAC:   /24  OT AM-PAC: 15  region [M19.012]    IF APPLICABLE: The Patient and/or patient representative Kaye and her family were provided with a choice of provider and agrees with the discharge plan. Freedom of choice list with basic dialogue that supports the patient's individualized plan of care/goals and shares the quality data associated with the providers was provided to:     Patient Representative Name:       The Patient and/or Patient Representative Agree with the Discharge Plan?      Landon Alvarado RN  Case Management Department  Ph: [phone] Fax: [phone]

## 2025-04-02 NOTE — PROGRESS NOTES
Patient up x2 assist to BSC. Patient tolerated well. Patient was incontinent of urine and voided 300ml. Patient returned to bed will call light within reach.

## 2025-04-02 NOTE — PROGRESS NOTES
Vitals and assessment completed at this time. Patient sitting up in bed with no distress noted. Patient remains on 2L O2. Patient states that she is still sleepy. Patient denies any pain to left shoulder at this time. Call light within reach and bed alarm engaged.

## 2025-04-02 NOTE — CARE COORDINATION
04/02/25 1017   IMM Letter   Observation Status Letter date given: 04/02/25   Observation Status Letter time given: 0945   Observation Status Letter given to Patient/Family/Significant other/Guardian/POA/by: To patient/ADRIEN Smith case manager

## 2025-04-02 NOTE — PLAN OF CARE
Problem: Discharge Planning  Goal: Discharge to home or other facility with appropriate resources  Outcome: Progressing  Flowsheets (Taken 4/1/2025 2306)  Discharge to home or other facility with appropriate resources:   Identify barriers to discharge with patient and caregiver   Arrange for needed discharge resources and transportation as appropriate   Identify discharge learning needs (meds, wound care, etc)     Problem: Pain  Goal: Verbalizes/displays adequate comfort level or baseline comfort level  Outcome: Progressing  Flowsheets  Taken 4/1/2025 2306 by Ary Vasquez RN  Verbalizes/displays adequate comfort level or baseline comfort level:   Encourage patient to monitor pain and request assistance   Assess pain using appropriate pain scale   Administer analgesics based on type and severity of pain and evaluate response    Problem: Safety - Adult  Goal: Free from fall injury  Outcome: Progressing  Flowsheets (Taken 4/1/2025 2306)  Free From Fall Injury:   Instruct family/caregiver on patient safety   Based on caregiver fall risk screen, instruct family/caregiver to ask for assistance with transferring infant if caregiver noted to have fall risk factors     Problem: ABCDS Injury Assessment  Goal: Absence of physical injury  Outcome: Progressing  Flowsheets (Taken 4/1/2025 2306)  Absence of Physical Injury: Implement safety measures based on patient assessment

## 2025-04-03 VITALS
TEMPERATURE: 97 F | HEIGHT: 60 IN | RESPIRATION RATE: 18 BRPM | HEART RATE: 72 BPM | BODY MASS INDEX: 42.59 KG/M2 | DIASTOLIC BLOOD PRESSURE: 66 MMHG | SYSTOLIC BLOOD PRESSURE: 127 MMHG | WEIGHT: 216.93 LBS | OXYGEN SATURATION: 93 %

## 2025-04-03 PROCEDURE — G0378 HOSPITAL OBSERVATION PER HR: HCPCS

## 2025-04-03 PROCEDURE — 97535 SELF CARE MNGMENT TRAINING: CPT

## 2025-04-03 PROCEDURE — 97110 THERAPEUTIC EXERCISES: CPT

## 2025-04-03 PROCEDURE — 97530 THERAPEUTIC ACTIVITIES: CPT

## 2025-04-03 PROCEDURE — 6370000000 HC RX 637 (ALT 250 FOR IP): Performed by: ORTHOPAEDIC SURGERY

## 2025-04-03 PROCEDURE — 94761 N-INVAS EAR/PLS OXIMETRY MLT: CPT

## 2025-04-03 PROCEDURE — 6370000000 HC RX 637 (ALT 250 FOR IP): Performed by: NURSE PRACTITIONER

## 2025-04-03 PROCEDURE — 2500000003 HC RX 250 WO HCPCS: Performed by: ORTHOPAEDIC SURGERY

## 2025-04-03 RX ADMIN — OXYCODONE HYDROCHLORIDE 5 MG: 5 TABLET ORAL at 05:33

## 2025-04-03 RX ADMIN — ATENOLOL 50 MG: 50 TABLET ORAL at 08:13

## 2025-04-03 RX ADMIN — ACETAMINOPHEN 650 MG: 325 TABLET ORAL at 12:17

## 2025-04-03 RX ADMIN — ACETAMINOPHEN 650 MG: 325 TABLET ORAL at 05:33

## 2025-04-03 RX ADMIN — APIXABAN 2.5 MG: 2.5 TABLET, FILM COATED ORAL at 08:13

## 2025-04-03 RX ADMIN — PANTOPRAZOLE SODIUM 40 MG: 40 TABLET, DELAYED RELEASE ORAL at 08:13

## 2025-04-03 RX ADMIN — CHLORTHALIDONE 25 MG: 25 TABLET ORAL at 08:13

## 2025-04-03 RX ADMIN — DULOXETINE HYDROCHLORIDE 30 MG: 30 CAPSULE, DELAYED RELEASE ORAL at 08:13

## 2025-04-03 RX ADMIN — SODIUM CHLORIDE, PRESERVATIVE FREE 10 ML: 5 INJECTION INTRAVENOUS at 08:14

## 2025-04-03 RX ADMIN — DOCUSATE SODIUM 50 MG AND SENNOSIDES 8.6 MG 1 TABLET: 8.6; 5 TABLET, FILM COATED ORAL at 08:13

## 2025-04-03 ASSESSMENT — PAIN DESCRIPTION - LOCATION: LOCATION: SHOULDER

## 2025-04-03 ASSESSMENT — PAIN DESCRIPTION - ORIENTATION: ORIENTATION: LEFT

## 2025-04-03 ASSESSMENT — PAIN SCALES - GENERAL
PAINLEVEL_OUTOF10: 4
PAINLEVEL_OUTOF10: 1
PAINLEVEL_OUTOF10: 2

## 2025-04-03 ASSESSMENT — PAIN DESCRIPTION - DESCRIPTORS: DESCRIPTORS: ACHING;PRESSURE

## 2025-04-03 ASSESSMENT — PAIN - FUNCTIONAL ASSESSMENT: PAIN_FUNCTIONAL_ASSESSMENT: ACTIVITIES ARE NOT PREVENTED

## 2025-04-03 NOTE — DISCHARGE SUMMARY
ADMISSION DIAGNOSIS:  Left shoulder primary osteoarthritis.     PROCEDURES WHILE INPATIENT:  Left reverse total shoulder arthroplasty and open biceps tenodesis performed on 4/1/2025.     HOSPITAL COURSE:  The patient presented to the hospital on day of surgery. Surgery was completed without complications.  Following surgery, the patient was admitted to regular nursing floor for postoperative pain control and physical therapy. The patient's pain was controlled with oral and IV medications. Patient had pharmacologic and mechanical DVT prophylaxis. The patient began working with physical therapy and occupational therapy. Case management was consulted for assistance with discharge planning.  The patient was deemed safe for discharge and was subsequently discharged following an uncomplicated postoperative course.    DISPOSITION:  [facility]     CONDITION UPON DISCHARGE:  Stable.     DISCHARGE MEDICATIONS:  The patient will resume home pre-hospital medication regimen.   New medications include:   Norco 5/325 mg 1 tablets every 4 hours as needed for pain  Resume Eliquis 2.5 mg twice/day     INSTRUCTIONS:  Keep sling in place except for hygiene.  Keep dressing in place and incision dry.     FOLLOW UP:  Follow up with Dr. Wyatt in 2 weeks.   Follow up with outpatient physical therapy in 2-3 days as scheduled.    Follow up with primary care physician within 1 month, or sooner as needed.

## 2025-04-03 NOTE — FLOWSHEET NOTE
Resting in bed with eyes closed. Awake for vitals and assessment. Alert and oriented x 4. Denies left shoulder pain currently. Dressing clean, dry and intact. Good circulation checks to left arm. No needs at present time. Call light within reach. Bed alarm on for safety. Care ongoing.

## 2025-04-03 NOTE — PROGRESS NOTES
Occupational Therapy  Facility/Department: [facility] MED SURG  Daily Treatment Note  NAME: Kaye Joyce  : 1945  MRN: [id number]    Date of Service: 4/3/2025    Discharge Recommendations:  Continue to assess pending progress, Subacute/Skilled Nursing Facility         Patient Diagnosis(es): The encounter diagnosis was Post-op pain.     Assessment   Activity Tolerance: Other (comment)  Discharge Recommendations: Continue to assess pending progress;Subacute/Skilled Nursing Facility     Plan  Occupational Therapy Plan  Times Per Day: Once a day  Days Per Week: 7 Days  Current Treatment Recommendations: Strengthening;ROM;Functional mobility training;Balance training;Endurance training;Safety education & training;Patient/Caregiver education & training;Equipment evaluation, education, & procurement;Self-Care / ADL    Restrictions   Contact, general fall risk.     Subjective  Subjective: Pt seated in recliner upon arrival. Pt agreed to self care tx seated sink side.  Pain: Pt reports 2/10 pain in L shoulder.       Objective  Vitals     ADL  Grooming: Stand by assistance  Grooming Skilled Clinical Factors: Pt req min A to reach for toothbrush and toothpaste from sink due to decreased shoulder flex.  UE Bathing: Moderate assistance  UE Bathing Skilled Clinical Factors: Pt req mod A when trying to bathe underneath armpits.  LE Bathing: Maximum assistance  UE Dressing: Moderate assistance  LE Dressing: Maximum assistance  Putting On/Taking Off Footwear: Maximum assistance  Functional Mobility: Stand by assistance;Contact guard assistance  Functional Mobility Skilled Clinical Factors: Pt used hemiwalker normal household distances w VC for tech and safety req.  Additional Comments: Pt req mod A with mulitple attempts to get up from STS.  Product Used : Soap and water;Other (comment) (lotion applied to patients back.)        Safety Devices  Type of Devices: All fall risk precautions in place;Call light within reach;Chair  alarm in place;Left in chair    Patient Education  Education Given To: Patient  Education Provided: Role of Therapy;Plan of Care;ADL Adaptive Strategies;Mobility Training;Fall Prevention Strategies;Transfer Training  Education Method: Demonstration;Verbal  Barriers to Learning: None  Education Outcome: Verbalized understanding;Demonstrated understanding    Goals  Short Term Goals  Time Frame for Short Term Goals: 21 days  Short Term Goal 1: Patient to complete ADL routine c mod I to ensure safe and indep return home.  Short Term Goal 2: Patient to complete BUE dressing with Ashley and use of AE/AD as needed, to ensure safe and indep return home.  Short Term Goal 3: Patient to engage in 10 minutes dynamic standing during functional task of choice without loss of balance for improved  I/ADL independence and safety upon return home.  Short Term Goal 4: Patient to be educated on d/c folder, AE/DME and home safety to ensure safe return home.    AM-PAC - ADL       Therapy Time   Individual Concurrent Group Co-treatment   Time In 1000         Time Out 1035         Minutes 35                 NIKKI Acevedo S/NIKKI

## 2025-04-03 NOTE — CARE COORDINATION
Patient is discharge to [facility] today.  Discharge paperwork done and fax to SNF.  [facility] will provide the transportation.  MEJIA Snyder

## 2025-04-03 NOTE — PLAN OF CARE
Problem: Discharge Planning  Goal: Discharge to home or other facility with appropriate resources  Outcome: Adequate for Discharge     Problem: Pain  Goal: Verbalizes/displays adequate comfort level or baseline comfort level  Outcome: Adequate for Discharge     Problem: Safety - Adult  Goal: Free from fall injury  Outcome: Adequate for Discharge     Problem: ABCDS Injury Assessment  Goal: Absence of physical injury  Outcome: Adequate for Discharge     Problem: Skin/Tissue Integrity  Goal: Skin integrity remains intact  Outcome: Adequate for Discharge

## 2025-04-03 NOTE — PROGRESS NOTES
[facility]  Inpatient/Observation/Outpatient Rehabilitation    Date: 4/3/2025  Patient Name: Kaye Joyce       [x] Inpatient Acute/Observation       []  Outpatient  : 1945     [x] Pt refused/declined therapy at this time due to:      Declined d/t discharging this afternoon . Pt. has not therapy questions at this time and is discharging skilled.      [] Pt cancelled due to:  [] No Reason Given   [] Sick/ill   [] Other:      [] Evaluation held by RN/Provider/Physical Therapist due to:    [] High Heart Rate   [] High Blood Pressure   [] Orthopedic Consult   [] Hgb < 7   [] Other:    [] Pt ordered brace per physician request:  [] Proper fit will be completed and education for wearing/skin checks    [] Pt does not require skilled services due to:      Therapist/Assistant will attempt to see this patient, at our earliest opportunity.       Tequila Roy, PTA Date: 4/3/2025

## 2025-04-03 NOTE — CONSULTS
Hospitalist Consult Note      Requesting Physician:  Dr. Wyatt     Reason for consultation: Med Management     SUBJECTIVE:    History of Present Illness:   The patient is a 79 y.o. female who underwent an elective left total shoulder replacement by Dr. Wyatt for degenerative arthritis and pain which was uncontrolled with outpatient conservative management.  Post-op course thus far has been uncomplicated.   Her pain is well controlled post operatively.  She denies nausea and vomiting post op.  She denies any chest pain, palpitations or shortness of breath. Feels good.  No concerns    Past Medical History:        Diagnosis Date    Arthritis     Blood clot in vein 04/2022    on Xarelto for two months per patient    Brain bleed ([facility]) 1983    Cataract     Closed nondisplaced fracture of surgical neck of right humerus with routine healing 01/11/2022    DVT (deep venous thrombosis) ([facility])     History of hip replacement     History of total bilateral knee replacement     Hypertension     S/P cardiac cath 02/17/2023    [facility]/Dr. Motniel/Right Radial    Shoulder fracture 06/2024    right    Sleep apnea        Past Surgical History:        Procedure Laterality Date    COLONOSCOPY  08/13/2019    COLONOSCOPY N/A 03/28/2023    COLORECTAL CANCER SCREENING, NOT HIGH RISK performed by Chasity Perez MD at [facility] OR    COLONOSCOPY  03/28/2023    -diverticulosis,hemorrhoids    ESOPHAGOGASTRODUODENOSCOPY  03/28/2023    -gastric antral vascular ectasia-clip placed    ESOPHAGOGASTRODUODENOSCOPY  08/23/2023    [id number]    JOINT REPLACEMENT Bilateral     knees and hips    UPPER GASTROINTESTINAL ENDOSCOPY N/A 03/28/2023    EGD ESOPHAGOGASTRODUODENOSCOPY performed by Chasity Perez MD at [facility] OR    UPPER GASTROINTESTINAL ENDOSCOPY N/A 08/23/2023    EGD ESOPHAGOGASTRODUODENOSCOPY performed by Chasity Perez MD at [facility] OR       Medications Prior to Admission:    Prior to Admission medications   1214)  Acute Illness - Body Fat Loss: No body fat loss (04/02/25 1214)  Acute Illness - Muscle Mass Loss: No muscle mass loss (04/02/25 1214)  Acute Illness - Fluid Accumulation : No fluid accumulation (04/02/25 1214)  Acute Illness -  Strength: Not Performed (04/02/25 1214)  Acute Illness - Malnutrition Score: 0 (04/02/25 1214)  Malnutrition Status: At risk for malnutrition (04/02/25 1214)    I agree with the dietitian's malnutrition assessment.    Medical Nutrition Therapy: continue current nutrition therapy    Electronically signed by GRIS Valdovinos CNP on 4/2/25 at 7:13 AM EDT      Hospital Prophylaxis:   DVT: Eliquis   Stress Ulcer: PPI       Disposition:  Shared decision making: All test results, treatment options and disposition options were discussed with the patient today  Social determinants of health that may impact management: none  Code status: Full Code   Disposition: Discharge plan is [facility] Medication Reconciliation documentation:    [x] I have utilized all available immediate resources to obtain, update, or review the patient's current medications (including all prescriptions, over-the-counter products, herbals, cannabinoid products and bitamin/mineral/dietary/nutritional supplements.  [If 'yes\", STOP HERE]     []  The patient is not eligible for medication reconciliation; the patient is in an emergent medical situation where delaying treatment would jeopardize the patient's health    []  I did NOT confirm, update or review the patient's current list of medications today.  [DOES NOT SATISFY [facility] PERFORMANCE]      GRIS Valdovinos CNP, GRIS, NP-C  4/3/2025, 7:06 AM

## 2025-04-03 NOTE — PROGRESS NOTES
Physical Therapy  Facility/Department: [facility] MED SURG  Daily Treatment Note  NAME: Kaye Joyce  : 1945  MRN: [id number]    Date of Service: 4/3/2025    Discharge Recommendations:  Continue to assess pending progress, Subacute/Skilled Nursing Facility      Patient Diagnosis(es): The encounter diagnosis was Post-op pain.    Assessment  Assessment: Pt bed mobility Daniel with supine to sit with cues for hand placement. Pt transfers from bed and tolieting CGA with cues for handplacement for increasing safety. Pt completed seated B LE exer x15 in all available planes of motion. Pt ambulating 40 ft with dominick walker, sling donned, CGA, and gait belt with cues for proper sequencing with walker. Therapist adjusted sling for comfort and donned pt yoly hose.  Activity Tolerance: Patient tolerated treatment well    Plan  Physical Therapy Plan  General Plan: 2 times a day 7 days a week  Specific Instructions for Next Treatment: once per day on weekends and holidays  Current Treatment Recommendations: Strengthening;ROM;Balance training;Functional mobility training;Transfer training;Endurance training;Gait training;Stair training;Neuromuscular re-education;Pain management;Home exercise program;Therapeutic activities    Restrictions  Restrictions/Precautions  Restrictions/Precautions: Weight Bearing  Upper Extremity Weight Bearing Restrictions  Left Upper Extremity Weight Bearing: Non Weight Bearing     Subjective   Subjective  Subjective: Pt in bed upon arrival with nurse, agreeable to therapy at this time.  Pain: denies    Objective  Bed Mobility Training  Bed Mobility Training: Yes  Overall Level of Assistance: Minimal assistance;Contact guard assistance  Interventions: Verbal cues  Supine to Sit: Minimal assistance  Scooting: Contact guard assistance  Balance  Sitting: Intact  Standing: Impaired  Standing - Static: Fair;Good  Standing - Dynamic: Fair;Good  Transfer Training  Transfer Training: Yes  Overall Level of

## 2025-04-03 NOTE — PROGRESS NOTES
Department of Orthopedic Surgery  Progress Note    Subjective:  No complaints.  Doing well postoperatively. Has been pleasantly surprised by lack of pain.  Denies pain in shoulder currently.  Pin is controlled with Tylenol alone.  Denies N/T.      Vitals  VITALS:  /66   Pulse 72   Temp 97 °F (36.1 °C) (Temporal)   Resp 18   Ht 1.524 m (5')   Wt 98.4 kg (216 lb 14.9 oz)   SpO2 93%   BMI 42.37 kg/m²     PHYSICAL EXAM:  General: in no apparent distress, well developed and well nourished, alert, and oriented times 3  Left Upper Extremity  Incision:  Aquacel dressing in place, clean, dry, and intact.  There is mild incisional bleeding noted but no saturation  Neurologic:  Moving upper extremity as appropriate following sugery. Motor function intact.  Sensation grossly intact to light touch.  Vascular: present 2+ radial pulse    Abnormal Exam findings:  none    LABS:  Hgb:    Lab Results   Component Value Date/Time    HGB 12.0 04/02/2025 09:15 AM     BMP:    Lab Results   Component Value Date/Time     04/02/2025 09:15 AM    K 3.8 04/02/2025 09:15 AM     04/02/2025 09:15 AM    CO2 28 04/02/2025 09:15 AM    BUN 26 04/02/2025 09:15 AM    CREATININE 1.1 04/02/2025 09:15 AM    CALCIUM 9.4 04/02/2025 09:15 AM    GFRAA >60 11/07/2016 09:59 AM    LABGLOM 50 04/02/2025 09:15 AM    LABGLOM 46 02/17/2023 05:35 AM    GLUCOSE 160 04/02/2025 09:15 AM    GLUCOSE 118 09/09/2024 10:25 AM       ASSESSMENT AND PLAN:  Post operative day 1 status post left reverse total shoulder arthroplasty.    1:  Continue sling  2:  Continue Deep venous thrombosis prophylaxis.  Resume Eliquis and Jonas hose  3:  Physical therapy.  PT at [facility]  4:  D/C Plan:  [facility]  5:  Continue Pain Control.  Norco 5-325 mg every 4-6 hours for severe pain.  Can use Tylenol and ice alone if adequate control  6:  F/u in 2 weeks

## 2025-07-15 ENCOUNTER — HOSPITAL ENCOUNTER (EMERGENCY)
Age: 80
Discharge: HOME | End: 2025-07-15
Attending: EMERGENCY MEDICINE
Payer: MEDICARE

## 2025-07-15 VITALS
RESPIRATION RATE: 16 BRPM | DIASTOLIC BLOOD PRESSURE: 85 MMHG | SYSTOLIC BLOOD PRESSURE: 129 MMHG | OXYGEN SATURATION: 94 % | HEART RATE: 88 BPM | TEMPERATURE: 98.2 F

## 2025-07-15 DIAGNOSIS — N30.00 ACUTE CYSTITIS WITHOUT HEMATURIA: Primary | ICD-10-CM

## 2025-07-15 LAB
ANION GAP SERPL CALCULATED.3IONS-SCNC: 14 MMOL/L (ref 9–16)
BACTERIA URNS QL MICRO: ABNORMAL
BASOPHILS # BLD: 0.03 K/UL (ref 0–0.2)
BASOPHILS NFR BLD: 0 % (ref 0–2)
BILIRUB UR QL STRIP: NEGATIVE
BUN SERPL-MCNC: 18 MG/DL (ref 8–23)
BUN/CREAT SERPL: 16 (ref 9–20)
CALCIUM SERPL-MCNC: 9.8 MG/DL (ref 8.6–10.4)
CHARACTER UR: ABNORMAL
CHLORIDE SERPL-SCNC: 96 MMOL/L (ref 98–107)
CLARITY UR: CLEAR
CO2 SERPL-SCNC: 26 MMOL/L (ref 20–31)
COLOR UR: YELLOW
CREAT SERPL-MCNC: 1.1 MG/DL (ref 0.5–0.9)
EOSINOPHIL # BLD: 0.04 K/UL (ref 0–0.44)
EOSINOPHILS RELATIVE PERCENT: 0 % (ref 1–4)
EPI CELLS #/AREA URNS HPF: ABNORMAL /HPF (ref 0–25)
ERYTHROCYTE [DISTWIDTH] IN BLOOD BY AUTOMATED COUNT: 13.2 % (ref 11.8–14.4)
GFR, ESTIMATED: 48 ML/MIN/1.73M2
GLUCOSE SERPL-MCNC: 104 MG/DL (ref 74–99)
GLUCOSE UR STRIP-MCNC: NEGATIVE MG/DL
HCT VFR BLD AUTO: 44 % (ref 36.3–47.1)
HGB BLD-MCNC: 14.4 G/DL (ref 11.9–15.1)
HGB UR QL STRIP.AUTO: ABNORMAL
IMM GRANULOCYTES # BLD AUTO: 0.07 K/UL (ref 0–0.3)
IMM GRANULOCYTES NFR BLD: 1 %
KETONES UR STRIP-MCNC: NEGATIVE MG/DL
LEUKOCYTE ESTERASE UR QL STRIP: ABNORMAL
LYMPHOCYTES NFR BLD: 0.74 K/UL (ref 1.1–3.7)
LYMPHOCYTES RELATIVE PERCENT: 6 % (ref 24–43)
MCH RBC QN AUTO: 29.3 PG (ref 25.2–33.5)
MCHC RBC AUTO-ENTMCNC: 32.7 G/DL (ref 28.4–34.8)
MCV RBC AUTO: 89.6 FL (ref 82.6–102.9)
MONOCYTES NFR BLD: 1.11 K/UL (ref 0.1–1.2)
MONOCYTES NFR BLD: 9 % (ref 3–12)
NEUTROPHILS NFR BLD: 84 % (ref 36–65)
NEUTS SEG NFR BLD: 10.03 K/UL (ref 1.5–8.1)
NITRITE UR QL STRIP: NEGATIVE
NRBC BLD-RTO: 0 PER 100 WBC
PH UR STRIP: 7 [PH] (ref 5–9)
PLATELET # BLD AUTO: 179 K/UL (ref 138–453)
PMV BLD AUTO: 9.9 FL (ref 8.1–13.5)
POTASSIUM SERPL-SCNC: 3.8 MMOL/L (ref 3.7–5.3)
PROT UR STRIP-MCNC: ABNORMAL MG/DL
RBC # BLD AUTO: 4.91 M/UL (ref 3.95–5.11)
RBC #/AREA URNS HPF: ABNORMAL /HPF (ref 0–2)
SODIUM SERPL-SCNC: 136 MMOL/L (ref 136–145)
SP GR UR STRIP: 1.01 (ref 1.01–1.02)
UROBILINOGEN UR STRIP-ACNC: NORMAL EU/DL (ref 0–1)
WBC #/AREA URNS HPF: ABNORMAL /HPF (ref 0–5)
WBC OTHER # BLD: 12 K/UL (ref 3.5–11.3)

## 2025-07-15 PROCEDURE — 80048 BASIC METABOLIC PNL TOTAL CA: CPT

## 2025-07-15 PROCEDURE — 87186 SC STD MICRODIL/AGAR DIL: CPT

## 2025-07-15 PROCEDURE — 85025 COMPLETE CBC W/AUTO DIFF WBC: CPT

## 2025-07-15 PROCEDURE — 2500000003 HC RX 250 WO HCPCS

## 2025-07-15 PROCEDURE — 81001 URINALYSIS AUTO W/SCOPE: CPT

## 2025-07-15 PROCEDURE — 99284 EMERGENCY DEPT VISIT MOD MDM: CPT

## 2025-07-15 PROCEDURE — 87077 CULTURE AEROBIC IDENTIFY: CPT

## 2025-07-15 PROCEDURE — 2580000003 HC RX 258: Performed by: EMERGENCY MEDICINE

## 2025-07-15 PROCEDURE — 87086 URINE CULTURE/COLONY COUNT: CPT

## 2025-07-15 PROCEDURE — 6360000002 HC RX W HCPCS

## 2025-07-15 PROCEDURE — 96374 THER/PROPH/DIAG INJ IV PUSH: CPT

## 2025-07-15 RX ORDER — 0.9 % SODIUM CHLORIDE 0.9 %
1000 INTRAVENOUS SOLUTION INTRAVENOUS ONCE
Status: COMPLETED | OUTPATIENT
Start: 2025-07-15 | End: 2025-07-15

## 2025-07-15 RX ORDER — CEFDINIR 300 MG/1
300 CAPSULE ORAL 2 TIMES DAILY
Qty: 14 CAPSULE | Refills: 0 | Status: SHIPPED | OUTPATIENT
Start: 2025-07-15 | End: 2025-07-22

## 2025-07-15 RX ADMIN — WATER 2000 MG: 1 INJECTION INTRAMUSCULAR; INTRAVENOUS; SUBCUTANEOUS at 19:27

## 2025-07-15 RX ADMIN — SODIUM CHLORIDE 1000 ML: 9 INJECTION, SOLUTION INTRAVENOUS at 18:14

## 2025-07-15 ASSESSMENT — PAIN - FUNCTIONAL ASSESSMENT: PAIN_FUNCTIONAL_ASSESSMENT: NONE - DENIES PAIN

## 2025-07-15 NOTE — DISCHARGE INSTRUCTIONS
Take your medication as indicated and prescribed.  If you are given an antibiotic then, make sure you get the prescription filled and take the antibiotics until finished.  Drink plenty of water while taking the antibiotics.      For pain use acetaminophen (Tylenol) or ibuprofen (Motrin / Advil), unless prescribed medications that have acetaminophen or ibuprofen (or similar medications) in it.     PLEASE RETURN TO THE EMERGENCY DEPARTMENT IMMEDIATELY for worsening symptoms, inability to urinate, worsening of blood in your urine, or if you develop any concerning symptoms such as: high fever not relieved by acetaminophen (Tylenol) and/or ibuprofen (Motrin / Advil), chills, shortness of breath, chest pain, feeling of your heart fluttering or racing, persistent nausea and/or vomiting, vomiting up blood, blood in your stool, loss of consciousness, numbness, weakness or tingling in the arms or legs or change in color of the extremities, changes in mental status, persistent headache, blurry vision, loss of bladder / bowel.

## 2025-07-15 NOTE — ED PROVIDER NOTES
[facility]  EMERGENCY DEPARTMENT TRANSITION OF CARE    Pt Name: Kaye Joyce  MRN: [id number]  Birthdate 1945  Date of evaluation: 7/15/2025  Provider: Kenia Ch MD    Care was assumed from Dr. Geller at 6:40PM.    SUMMARY OF CARE     79 y.o. female who presents with chills that started yesterday, patient has continued to feel unwell today with generalized weakness.  History of UTIs.  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  Patient's last bowel movement was earlier today she denies any bloody bowel movements or any black-colored stool.  She initially presented to PCP who was concerned on patient's appearance and sent her over to the emergency room.  Patient states that she is typically ambulatory with a cane but today she could not even get herself up as she is too weak.  She also has had lack of appetite, no abdominal pain no chest pain she reports that she is more short of breath today with movement.  But overall is just more fatigued.  Blood work consistent with a leukocytosis with no kidney dysfunction normal electrolytes.  Pending urinalysis.    PLAN AS DISCUSSED WITH PRIOR CLINICIAN     Pending urinalysis and reevaluation    MY PHYSICAL EXAM       Physical Exam  Constitutional:       General: She is not in acute distress.     Appearance: Normal appearance. She is normal weight. She is not ill-appearing, toxic-appearing or diaphoretic.   HENT:      Head: Normocephalic and atraumatic.      Right Ear: External ear normal.      Left Ear: External ear normal.      Nose: Nose normal. No congestion or rhinorrhea.      Mouth/Throat:      Mouth: Mucous membranes are moist.      Pharynx: Oropharynx is clear. No oropharyngeal exudate or posterior oropharyngeal erythema.   Eyes:      Conjunctiva/sclera: Conjunctivae normal.      Pupils: Pupils are equal, round, and reactive to light.   Cardiovascular:      Rate and Rhythm: Normal rate and regular rhythm.      Pulses: Normal pulses.

## 2025-07-15 NOTE — ED PROVIDER NOTES
[facility] EMERGENCY DEPARTMENT  Emergency Department Encounter  Emergency Medicine      Pt Name:Kaye Joyce  MRN: [id number]  Birthdate 1945  Date of evaluation: 7/15/25  PCP:  Robert Gonsales MD  5:30 PM EDT      CHIEF COMPLAINT       Chief Complaint   Patient presents with    Dysuria     Pt reports feeling chills and sweating over night. Denies discomfort with urination, but has urgency and frequency. Lack of energy. Was seen at pcp and unable to provide urine sample. Sent here for further workup.        HISTORY OF PRESENT ILLNESS  (Location/Symptom, Timing/Onset, Context/Setting, Quality, Duration, Modifying Factors, Severity.)      Kaye Joyce is a 79 y.o. female who presents with chills that started yesterday, patient has continued to feel unwell today with generalized weakness.  History of UTIs.  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  Patient's last bowel movement was earlier today she denies any bloody bowel movements or any black-colored stool.  She initially presented to PCP who was concerned on patient's appearance and sent her over to the emergency room.  Patient states that she is typically ambulatory with a cane but today she could not even get herself up as she is too weak.  She also has had lack of appetite, no abdominal pain no chest pain she reports that she is more short of breath today with movement.  But overall is just more fatigued    PAST MEDICAL / SURGICAL / SOCIAL / FAMILY HISTORY      has a past medical history of Arthritis, Blood clot in vein, Brain bleed (HCC), Cataract, Closed nondisplaced fracture of surgical neck of right humerus with routine healing, DVT (deep venous thrombosis) (HCC), History of hip replacement, History of total bilateral knee replacement, Hypertension, S/P cardiac cath, Shoulder fracture, and Sleep apnea.       has a past surgical history that includes joint replacement (Bilateral); Colonoscopy (08/13/2019); Colonoscopy (N/A,

## 2025-07-17 LAB
MICROORGANISM SPEC CULT: ABNORMAL
SPECIMEN DESCRIPTION: ABNORMAL

## (undated) DEVICE — CANNULA ORAL NSL AD CO2 N INTUB O2 DEL DISP TRU LNK

## (undated) DEVICE — SOLUTION IV IRRIG POUR BRL 0.9% SODIUM CHL 2F7124

## (undated) DEVICE — MERCY TIFFIN BASIC-LF: Brand: MEDLINE INDUSTRIES, INC.

## (undated) DEVICE — DUAL CUT SAGITTAL BLADE

## (undated) DEVICE — DRAPE,U/ SHT,SPLIT,PLAS,STERIL: Brand: MEDLINE

## (undated) DEVICE — TOWEL SURG SM W12XL18IN CLR PLAS TEAR RESIST REINF ADH FRST

## (undated) DEVICE — SUTURE VICRYL + SZ 1 L36IN ABSRB UD L36MM CT-1 1/2 CIR VCP947H

## (undated) DEVICE — SHEET, DRAPE, SPLIT, STERILE: Brand: MEDLINE

## (undated) DEVICE — GUIDEPIN ORTH 2.5X220 MM SHLDR AEQUALIS PERFORM+

## (undated) DEVICE — GUIDEPIN ORTHOPEDIC L 100 MM DIA 3 MM NS LF DISP TORNIER

## (undated) DEVICE — 450 ML BOTTLE OF 0.05% CHLORHEXIDINE GLUCONATE IN 99.95% STERILE WATER FOR IRRIGATION, USP AND APPLICATOR.: Brand: IRRISEPT ANTIMICROBIAL WOUND LAVAGE

## (undated) DEVICE — SUTURE ETHIBOND 2 L30IN NONABSORBABLE GRN WHT LT GRN MO-7 D8793

## (undated) DEVICE — BLADE SURG NO10 C STL STR DISP GLASSVAN

## (undated) DEVICE — DRESSING HYDROFIBER AQUACEL AG ADVANTAGE 3.5X10 IN

## (undated) DEVICE — Device

## (undated) DEVICE — SOLUTION IRRIG 1000ML 0.9% SOD CHL USP POUR PLAS BTL

## (undated) DEVICE — DRAPE SURG W48XL52IN POLY U FEN REINF ADV ADH MATTE FINISH

## (undated) DEVICE — COVER,TABLE,HEAVY DUTY,77"X90",STRL: Brand: MEDLINE

## (undated) DEVICE — COVER,MAYO STAND,STERILE: Brand: MEDLINE

## (undated) DEVICE — BANDAGE COBAN 4 IN COMPR W4INXL5YD FOAM COHESIVE QUIK STK SELF ADH SFT

## (undated) DEVICE — UNDERGLOVE BIOGEL ULTRATOUCH S INDICATOR SZ 8

## (undated) DEVICE — PROTECTOR EYE PT SELF ADH NS OPT GRD LF

## (undated) DEVICE — HEMOSPRAY ENDOSCOPIC HEMOSTAT: Brand: HEMOSPRAY

## (undated) DEVICE — 3M™ IOBAN™ 2 ANTIMICROBIAL INCISE DRAPE 6650EZ: Brand: IOBAN™ 2

## (undated) DEVICE — TUBING SUCT NON-STRL 9/32X100 W/CNNT

## (undated) DEVICE — FAN SPRAY KIT: Brand: PULSAVAC®

## (undated) DEVICE — ULTRACLEAN ACCESSORY ELECTRODE 4" (10.16 CM) COATED BLADE: Brand: ULTRACLEAN

## (undated) DEVICE — GLOVE SURG SZ 75 CRM LTX FREE POLYISOPRENE POLYMER BEAD ANTI

## (undated) DEVICE — GUIDE GLEN REVERSED BLUEPRINT

## (undated) DEVICE — SPONGE LAP W18XL18IN WHT COT 4 PLY FLD STRUNG RADPQ DISP ST 2 PER PACK

## (undated) DEVICE — ELECTRODE PT RET AD L9FT HI MOIST COND ADH HYDRGEL CORDED

## (undated) DEVICE — SYRINGE 20ML LL S/C 50

## (undated) DEVICE — STOCKINETTE ORTH W9XL36IN COT 2 PLY HLLW FOR HANDLING LMB

## (undated) DEVICE — HOOD: Brand: FLYTE, SURGICOOL

## (undated) DEVICE — CLIP LIG L235CM RESOL 360 BX/20

## (undated) DEVICE — NEEDLE SUT T-5 L26.5MM 1/2 CIR TAPR W/ NIT LOOP

## (undated) DEVICE — SUTURE TICRN BR BL NDL C-20 SZ 5 30 8886302779

## (undated) DEVICE — SUTURE VICRYL ABSORBABLE BRAIDED 2-0 CT 36 IN DA UD  VCP957H

## (undated) DEVICE — SOLUTION IV 1000ML 0.9% SOD CHL FOR IRRIG PLAS CONT

## (undated) DEVICE — BIPOLAR ELECTROHEMOSTASIS CATHETER: Brand: GOLD PROBE

## (undated) DEVICE — STAPLER 35 WIDE: Brand: MEDLINE INDUSTRIES, INC.